# Patient Record
Sex: MALE | Race: WHITE | NOT HISPANIC OR LATINO | Employment: FULL TIME | URBAN - METROPOLITAN AREA
[De-identification: names, ages, dates, MRNs, and addresses within clinical notes are randomized per-mention and may not be internally consistent; named-entity substitution may affect disease eponyms.]

---

## 2017-01-04 ENCOUNTER — LAB VISIT (OUTPATIENT)
Dept: LAB | Facility: HOSPITAL | Age: 67
End: 2017-01-04
Attending: INTERNAL MEDICINE
Payer: COMMERCIAL

## 2017-01-04 ENCOUNTER — TELEPHONE (OUTPATIENT)
Dept: ENDOSCOPY | Facility: HOSPITAL | Age: 67
End: 2017-01-04

## 2017-01-04 ENCOUNTER — ANTI-COAG VISIT (OUTPATIENT)
Dept: CARDIOLOGY | Facility: CLINIC | Age: 67
End: 2017-01-04

## 2017-01-04 DIAGNOSIS — D68.61 ANTI-PHOSPHOLIPID ANTIBODY SYNDROME: ICD-10-CM

## 2017-01-04 DIAGNOSIS — Z79.01 LONG TERM CURRENT USE OF ANTICOAGULANT THERAPY: ICD-10-CM

## 2017-01-04 DIAGNOSIS — M32.9 LUPUS (SYSTEMIC LUPUS ERYTHEMATOSUS): ICD-10-CM

## 2017-01-04 LAB
INR PPP: 2
PROTHROMBIN TIME: 19.9 SEC

## 2017-01-04 PROCEDURE — 36415 COLL VENOUS BLD VENIPUNCTURE: CPT | Mod: PO

## 2017-01-04 PROCEDURE — 85610 PROTHROMBIN TIME: CPT

## 2017-01-10 ENCOUNTER — PATIENT MESSAGE (OUTPATIENT)
Dept: GASTROENTEROLOGY | Facility: CLINIC | Age: 67
End: 2017-01-10

## 2017-01-11 ENCOUNTER — LAB VISIT (OUTPATIENT)
Dept: LAB | Facility: HOSPITAL | Age: 67
End: 2017-01-11
Attending: INTERNAL MEDICINE
Payer: COMMERCIAL

## 2017-01-11 ENCOUNTER — ANTI-COAG VISIT (OUTPATIENT)
Dept: CARDIOLOGY | Facility: CLINIC | Age: 67
End: 2017-01-11

## 2017-01-11 DIAGNOSIS — M32.9 LUPUS (SYSTEMIC LUPUS ERYTHEMATOSUS): ICD-10-CM

## 2017-01-11 DIAGNOSIS — D68.61 ANTI-PHOSPHOLIPID ANTIBODY SYNDROME: ICD-10-CM

## 2017-01-11 DIAGNOSIS — Z79.01 LONG TERM CURRENT USE OF ANTICOAGULANT THERAPY: ICD-10-CM

## 2017-01-11 LAB
INR PPP: 2.7
PROTHROMBIN TIME: 26.6 SEC

## 2017-01-11 PROCEDURE — 36415 COLL VENOUS BLD VENIPUNCTURE: CPT | Mod: PO

## 2017-01-11 PROCEDURE — 85610 PROTHROMBIN TIME: CPT

## 2017-01-25 ENCOUNTER — ANTI-COAG VISIT (OUTPATIENT)
Dept: CARDIOLOGY | Facility: CLINIC | Age: 67
End: 2017-01-25

## 2017-01-25 ENCOUNTER — LAB VISIT (OUTPATIENT)
Dept: LAB | Facility: HOSPITAL | Age: 67
End: 2017-01-25
Attending: INTERNAL MEDICINE
Payer: COMMERCIAL

## 2017-01-25 DIAGNOSIS — D68.61 ANTI-PHOSPHOLIPID ANTIBODY SYNDROME: ICD-10-CM

## 2017-01-25 DIAGNOSIS — Z79.01 LONG TERM CURRENT USE OF ANTICOAGULANT THERAPY: ICD-10-CM

## 2017-01-25 DIAGNOSIS — M32.9 LUPUS (SYSTEMIC LUPUS ERYTHEMATOSUS): ICD-10-CM

## 2017-01-25 LAB
INR PPP: 2.6
PROTHROMBIN TIME: 26 SEC

## 2017-01-25 PROCEDURE — 36415 COLL VENOUS BLD VENIPUNCTURE: CPT | Mod: PO

## 2017-01-25 PROCEDURE — 85610 PROTHROMBIN TIME: CPT

## 2017-02-10 ENCOUNTER — ANTI-COAG VISIT (OUTPATIENT)
Dept: CARDIOLOGY | Facility: CLINIC | Age: 67
End: 2017-02-10

## 2017-02-10 ENCOUNTER — LAB VISIT (OUTPATIENT)
Dept: LAB | Facility: HOSPITAL | Age: 67
End: 2017-02-10
Attending: INTERNAL MEDICINE
Payer: COMMERCIAL

## 2017-02-10 DIAGNOSIS — D68.61 ANTI-PHOSPHOLIPID ANTIBODY SYNDROME: ICD-10-CM

## 2017-02-10 DIAGNOSIS — M32.9 LUPUS (SYSTEMIC LUPUS ERYTHEMATOSUS): ICD-10-CM

## 2017-02-10 DIAGNOSIS — Z79.01 LONG TERM CURRENT USE OF ANTICOAGULANT THERAPY: ICD-10-CM

## 2017-02-10 LAB
INR PPP: 2.6
PROTHROMBIN TIME: 26.1 SEC

## 2017-02-10 PROCEDURE — 85610 PROTHROMBIN TIME: CPT

## 2017-02-10 PROCEDURE — 36415 COLL VENOUS BLD VENIPUNCTURE: CPT | Mod: PO

## 2017-03-08 ENCOUNTER — LAB VISIT (OUTPATIENT)
Dept: LAB | Facility: HOSPITAL | Age: 67
End: 2017-03-08
Attending: INTERNAL MEDICINE
Payer: COMMERCIAL

## 2017-03-08 DIAGNOSIS — Z79.01 LONG TERM CURRENT USE OF ANTICOAGULANT THERAPY: ICD-10-CM

## 2017-03-08 DIAGNOSIS — D68.61 ANTI-PHOSPHOLIPID ANTIBODY SYNDROME: ICD-10-CM

## 2017-03-08 LAB
INR PPP: 2.4
PROTHROMBIN TIME: 23.9 SEC

## 2017-03-08 PROCEDURE — 85610 PROTHROMBIN TIME: CPT

## 2017-03-08 PROCEDURE — 36415 COLL VENOUS BLD VENIPUNCTURE: CPT | Mod: PO

## 2017-03-09 ENCOUNTER — ANTI-COAG VISIT (OUTPATIENT)
Dept: CARDIOLOGY | Facility: CLINIC | Age: 67
End: 2017-03-09

## 2017-03-09 DIAGNOSIS — Z79.01 LONG TERM CURRENT USE OF ANTICOAGULANT THERAPY: ICD-10-CM

## 2017-03-09 DIAGNOSIS — D68.61 ANTI-PHOSPHOLIPID ANTIBODY SYNDROME: ICD-10-CM

## 2017-04-05 ENCOUNTER — ANTI-COAG VISIT (OUTPATIENT)
Dept: CARDIOLOGY | Facility: CLINIC | Age: 67
End: 2017-04-05

## 2017-04-05 ENCOUNTER — LAB VISIT (OUTPATIENT)
Dept: LAB | Facility: HOSPITAL | Age: 67
End: 2017-04-05
Attending: INTERNAL MEDICINE
Payer: COMMERCIAL

## 2017-04-05 DIAGNOSIS — D68.61 ANTI-PHOSPHOLIPID ANTIBODY SYNDROME: ICD-10-CM

## 2017-04-05 DIAGNOSIS — Z79.01 LONG TERM CURRENT USE OF ANTICOAGULANT THERAPY: ICD-10-CM

## 2017-04-05 LAB
INR PPP: 3.4
PROTHROMBIN TIME: 34.7 SEC

## 2017-04-05 PROCEDURE — 36415 COLL VENOUS BLD VENIPUNCTURE: CPT | Mod: PO

## 2017-04-05 PROCEDURE — 85610 PROTHROMBIN TIME: CPT

## 2017-05-03 ENCOUNTER — LAB VISIT (OUTPATIENT)
Dept: LAB | Facility: HOSPITAL | Age: 67
End: 2017-05-03
Attending: INTERNAL MEDICINE
Payer: COMMERCIAL

## 2017-05-03 ENCOUNTER — ANTI-COAG VISIT (OUTPATIENT)
Dept: CARDIOLOGY | Facility: CLINIC | Age: 67
End: 2017-05-03

## 2017-05-03 DIAGNOSIS — Z79.01 LONG TERM CURRENT USE OF ANTICOAGULANT THERAPY: ICD-10-CM

## 2017-05-03 DIAGNOSIS — D68.61 ANTI-PHOSPHOLIPID ANTIBODY SYNDROME: ICD-10-CM

## 2017-05-03 LAB
INR PPP: 2.7
PROTHROMBIN TIME: 26.9 SEC

## 2017-05-03 PROCEDURE — 36415 COLL VENOUS BLD VENIPUNCTURE: CPT | Mod: PO

## 2017-05-03 PROCEDURE — 85610 PROTHROMBIN TIME: CPT

## 2017-06-01 ENCOUNTER — LAB VISIT (OUTPATIENT)
Dept: LAB | Facility: HOSPITAL | Age: 67
End: 2017-06-01
Attending: INTERNAL MEDICINE
Payer: COMMERCIAL

## 2017-06-01 DIAGNOSIS — D68.61 ANTI-PHOSPHOLIPID ANTIBODY SYNDROME: ICD-10-CM

## 2017-06-01 DIAGNOSIS — Z79.01 LONG TERM CURRENT USE OF ANTICOAGULANT THERAPY: ICD-10-CM

## 2017-06-01 LAB
INR PPP: 3
PROTHROMBIN TIME: 30.1 SEC

## 2017-06-01 PROCEDURE — 36415 COLL VENOUS BLD VENIPUNCTURE: CPT | Mod: PO

## 2017-06-01 PROCEDURE — 85610 PROTHROMBIN TIME: CPT

## 2017-06-02 ENCOUNTER — ANTI-COAG VISIT (OUTPATIENT)
Dept: CARDIOLOGY | Facility: CLINIC | Age: 67
End: 2017-06-02

## 2017-06-02 DIAGNOSIS — Z79.01 LONG TERM CURRENT USE OF ANTICOAGULANT THERAPY: ICD-10-CM

## 2017-06-02 DIAGNOSIS — D68.61 ANTI-PHOSPHOLIPID ANTIBODY SYNDROME: ICD-10-CM

## 2017-07-05 ENCOUNTER — LAB VISIT (OUTPATIENT)
Dept: LAB | Facility: HOSPITAL | Age: 67
End: 2017-07-05
Attending: INTERNAL MEDICINE
Payer: COMMERCIAL

## 2017-07-05 DIAGNOSIS — Z79.01 LONG TERM CURRENT USE OF ANTICOAGULANT THERAPY: ICD-10-CM

## 2017-07-05 DIAGNOSIS — D68.61 ANTI-PHOSPHOLIPID ANTIBODY SYNDROME: ICD-10-CM

## 2017-07-05 LAB
INR PPP: 3.4
PROTHROMBIN TIME: 33.7 SEC

## 2017-07-05 PROCEDURE — 85610 PROTHROMBIN TIME: CPT

## 2017-07-05 PROCEDURE — 36415 COLL VENOUS BLD VENIPUNCTURE: CPT | Mod: PO

## 2017-07-06 ENCOUNTER — ANTI-COAG VISIT (OUTPATIENT)
Dept: CARDIOLOGY | Facility: CLINIC | Age: 67
End: 2017-07-06

## 2017-07-06 DIAGNOSIS — Z79.01 LONG TERM CURRENT USE OF ANTICOAGULANT THERAPY: ICD-10-CM

## 2017-07-06 DIAGNOSIS — D68.61 ANTI-PHOSPHOLIPID ANTIBODY SYNDROME: ICD-10-CM

## 2017-07-23 DIAGNOSIS — M32.9 LUPUS (SYSTEMIC LUPUS ERYTHEMATOSUS): ICD-10-CM

## 2017-07-23 DIAGNOSIS — Z79.01 LONG TERM CURRENT USE OF ANTICOAGULANT THERAPY: ICD-10-CM

## 2017-07-23 DIAGNOSIS — D68.61 ANTI-PHOSPHOLIPID ANTIBODY SYNDROME: ICD-10-CM

## 2017-07-23 RX ORDER — WARFARIN SODIUM 5 MG/1
TABLET ORAL
Qty: 195 TABLET | Refills: 0 | Status: SHIPPED | OUTPATIENT
Start: 2017-07-23 | End: 2017-10-20 | Stop reason: SDUPTHER

## 2017-08-03 DIAGNOSIS — M32.19 OTHER SYSTEMIC LUPUS ERYTHEMATOSUS WITH OTHER ORGAN INVOLVEMENT: Primary | ICD-10-CM

## 2017-08-03 RX ORDER — HYDROXYCHLOROQUINE SULFATE 200 MG/1
200 TABLET, FILM COATED ORAL 2 TIMES DAILY
Qty: 180 TABLET | Refills: 0 | Status: SHIPPED | OUTPATIENT
Start: 2017-08-03 | End: 2017-11-01 | Stop reason: SDUPTHER

## 2017-08-16 ENCOUNTER — LAB VISIT (OUTPATIENT)
Dept: LAB | Facility: HOSPITAL | Age: 67
End: 2017-08-16
Attending: INTERNAL MEDICINE
Payer: COMMERCIAL

## 2017-08-16 ENCOUNTER — ANTI-COAG VISIT (OUTPATIENT)
Dept: CARDIOLOGY | Facility: CLINIC | Age: 67
End: 2017-08-16

## 2017-08-16 DIAGNOSIS — D68.61 ANTI-PHOSPHOLIPID ANTIBODY SYNDROME: ICD-10-CM

## 2017-08-16 DIAGNOSIS — Z79.01 LONG TERM CURRENT USE OF ANTICOAGULANT THERAPY: ICD-10-CM

## 2017-08-16 LAB
INR PPP: 3.5
PROTHROMBIN TIME: 34.8 SEC

## 2017-08-16 PROCEDURE — 36415 COLL VENOUS BLD VENIPUNCTURE: CPT | Mod: PO

## 2017-08-16 PROCEDURE — 85610 PROTHROMBIN TIME: CPT

## 2017-09-18 ENCOUNTER — PATIENT MESSAGE (OUTPATIENT)
Dept: CARDIOLOGY | Facility: CLINIC | Age: 67
End: 2017-09-18

## 2017-09-29 ENCOUNTER — LAB VISIT (OUTPATIENT)
Dept: LAB | Facility: HOSPITAL | Age: 67
End: 2017-09-29
Attending: INTERNAL MEDICINE
Payer: COMMERCIAL

## 2017-09-29 ENCOUNTER — ANTI-COAG VISIT (OUTPATIENT)
Dept: CARDIOLOGY | Facility: CLINIC | Age: 67
End: 2017-09-29

## 2017-09-29 DIAGNOSIS — Z79.01 LONG TERM CURRENT USE OF ANTICOAGULANT THERAPY: ICD-10-CM

## 2017-09-29 DIAGNOSIS — D68.61 ANTI-PHOSPHOLIPID ANTIBODY SYNDROME: ICD-10-CM

## 2017-09-29 LAB
INR PPP: 3
PROTHROMBIN TIME: 30.1 SEC

## 2017-09-29 PROCEDURE — 85610 PROTHROMBIN TIME: CPT

## 2017-09-29 PROCEDURE — 36415 COLL VENOUS BLD VENIPUNCTURE: CPT | Mod: PO

## 2017-10-15 ENCOUNTER — PATIENT MESSAGE (OUTPATIENT)
Dept: RHEUMATOLOGY | Facility: CLINIC | Age: 67
End: 2017-10-15

## 2017-10-20 DIAGNOSIS — Z79.01 LONG TERM CURRENT USE OF ANTICOAGULANT THERAPY: ICD-10-CM

## 2017-10-20 DIAGNOSIS — D68.61 ANTI-PHOSPHOLIPID ANTIBODY SYNDROME: ICD-10-CM

## 2017-10-20 DIAGNOSIS — M32.9 LUPUS (SYSTEMIC LUPUS ERYTHEMATOSUS): ICD-10-CM

## 2017-10-21 RX ORDER — WARFARIN SODIUM 5 MG/1
TABLET ORAL
Qty: 195 TABLET | Refills: 0 | Status: SHIPPED | OUTPATIENT
Start: 2017-10-21 | End: 2017-12-23 | Stop reason: SDUPTHER

## 2017-11-01 DIAGNOSIS — M32.19 OTHER SYSTEMIC LUPUS ERYTHEMATOSUS WITH OTHER ORGAN INVOLVEMENT: ICD-10-CM

## 2017-11-01 RX ORDER — HYDROXYCHLOROQUINE SULFATE 200 MG/1
TABLET, FILM COATED ORAL
Qty: 180 TABLET | Refills: 0 | Status: SHIPPED | OUTPATIENT
Start: 2017-11-01 | End: 2018-01-30 | Stop reason: SDUPTHER

## 2017-11-15 ENCOUNTER — LAB VISIT (OUTPATIENT)
Dept: LAB | Facility: HOSPITAL | Age: 67
End: 2017-11-15
Attending: INTERNAL MEDICINE
Payer: COMMERCIAL

## 2017-11-15 ENCOUNTER — ANTI-COAG VISIT (OUTPATIENT)
Dept: CARDIOLOGY | Facility: CLINIC | Age: 67
End: 2017-11-15

## 2017-11-15 DIAGNOSIS — Z79.899 HIGH RISK MEDICATION USE: ICD-10-CM

## 2017-11-15 DIAGNOSIS — Z79.01 LONG TERM CURRENT USE OF ANTICOAGULANT THERAPY: ICD-10-CM

## 2017-11-15 DIAGNOSIS — D68.61 ANTI-PHOSPHOLIPID ANTIBODY SYNDROME: Chronic | ICD-10-CM

## 2017-11-15 DIAGNOSIS — M32.9 LUPUS (SYSTEMIC LUPUS ERYTHEMATOSUS): Chronic | ICD-10-CM

## 2017-11-15 DIAGNOSIS — D68.61 ANTI-PHOSPHOLIPID ANTIBODY SYNDROME: ICD-10-CM

## 2017-11-15 LAB
25(OH)D3+25(OH)D2 SERPL-MCNC: 53 NG/ML
ALBUMIN SERPL BCP-MCNC: 3.5 G/DL
ALP SERPL-CCNC: 74 U/L
ALT SERPL W/O P-5'-P-CCNC: 21 U/L
ANION GAP SERPL CALC-SCNC: 7 MMOL/L
AST SERPL-CCNC: 24 U/L
BASOPHILS # BLD AUTO: 0 K/UL
BASOPHILS NFR BLD: 0 %
BILIRUB SERPL-MCNC: 0.3 MG/DL
BUN SERPL-MCNC: 15 MG/DL
C3 SERPL-MCNC: 67 MG/DL
C4 SERPL-MCNC: 11 MG/DL
CALCIUM SERPL-MCNC: 9.2 MG/DL
CHLORIDE SERPL-SCNC: 103 MMOL/L
CO2 SERPL-SCNC: 28 MMOL/L
CREAT SERPL-MCNC: 1.3 MG/DL
CRP SERPL-MCNC: 3.5 MG/L
DIFFERENTIAL METHOD: ABNORMAL
EOSINOPHIL # BLD AUTO: 0.1 K/UL
EOSINOPHIL NFR BLD: 5.8 %
ERYTHROCYTE [DISTWIDTH] IN BLOOD BY AUTOMATED COUNT: 12.4 %
ERYTHROCYTE [SEDIMENTATION RATE] IN BLOOD BY WESTERGREN METHOD: 20 MM/HR
EST. GFR  (AFRICAN AMERICAN): >60 ML/MIN/1.73 M^2
EST. GFR  (NON AFRICAN AMERICAN): 57.3 ML/MIN/1.73 M^2
GLUCOSE SERPL-MCNC: 82 MG/DL
HCT VFR BLD AUTO: 35.3 %
HGB BLD-MCNC: 12.1 G/DL
IMM GRANULOCYTES # BLD AUTO: 0.01 K/UL
IMM GRANULOCYTES NFR BLD AUTO: 0.4 %
INR PPP: 2.8
LYMPHOCYTES # BLD AUTO: 1 K/UL
LYMPHOCYTES NFR BLD: 41.2 %
MCH RBC QN AUTO: 31.2 PG
MCHC RBC AUTO-ENTMCNC: 34.3 G/DL
MCV RBC AUTO: 91 FL
MONOCYTES # BLD AUTO: 0.3 K/UL
MONOCYTES NFR BLD: 11.9 %
NEUTROPHILS # BLD AUTO: 1 K/UL
NEUTROPHILS NFR BLD: 40.7 %
NRBC BLD-RTO: 0 /100 WBC
PLATELET # BLD AUTO: 191 K/UL
PMV BLD AUTO: 10.3 FL
POTASSIUM SERPL-SCNC: 4.1 MMOL/L
PROT SERPL-MCNC: 6.4 G/DL
PROTHROMBIN TIME: 27.6 SEC
RBC # BLD AUTO: 3.88 M/UL
SODIUM SERPL-SCNC: 138 MMOL/L
WBC # BLD AUTO: 2.43 K/UL

## 2017-11-15 PROCEDURE — 86160 COMPLEMENT ANTIGEN: CPT | Mod: 59

## 2017-11-15 PROCEDURE — 86225 DNA ANTIBODY NATIVE: CPT

## 2017-11-15 PROCEDURE — 36415 COLL VENOUS BLD VENIPUNCTURE: CPT | Mod: PO

## 2017-11-15 PROCEDURE — 85651 RBC SED RATE NONAUTOMATED: CPT

## 2017-11-15 PROCEDURE — 86160 COMPLEMENT ANTIGEN: CPT

## 2017-11-15 PROCEDURE — 85610 PROTHROMBIN TIME: CPT

## 2017-11-15 PROCEDURE — 86880 COOMBS TEST DIRECT: CPT

## 2017-11-15 PROCEDURE — 86140 C-REACTIVE PROTEIN: CPT

## 2017-11-15 PROCEDURE — 80053 COMPREHEN METABOLIC PANEL: CPT

## 2017-11-15 PROCEDURE — 85025 COMPLETE CBC W/AUTO DIFF WBC: CPT

## 2017-11-15 PROCEDURE — 82306 VITAMIN D 25 HYDROXY: CPT

## 2017-11-16 LAB
DAT IGG-SP REAG RBC-IMP: NORMAL
DSDNA AB SER-ACNC: NORMAL [IU]/ML

## 2017-11-29 ENCOUNTER — LAB VISIT (OUTPATIENT)
Dept: LAB | Facility: HOSPITAL | Age: 67
End: 2017-11-29
Attending: INTERNAL MEDICINE
Payer: COMMERCIAL

## 2017-11-29 ENCOUNTER — OFFICE VISIT (OUTPATIENT)
Dept: RHEUMATOLOGY | Facility: CLINIC | Age: 67
End: 2017-11-29
Payer: COMMERCIAL

## 2017-11-29 VITALS
SYSTOLIC BLOOD PRESSURE: 114 MMHG | BODY MASS INDEX: 29.78 KG/M2 | WEIGHT: 208 LBS | HEIGHT: 70 IN | DIASTOLIC BLOOD PRESSURE: 69 MMHG | HEART RATE: 93 BPM

## 2017-11-29 DIAGNOSIS — M32.19 OTHER SYSTEMIC LUPUS ERYTHEMATOSUS WITH OTHER ORGAN INVOLVEMENT: Primary | Chronic | ICD-10-CM

## 2017-11-29 DIAGNOSIS — Z79.01 LONG TERM CURRENT USE OF ANTICOAGULANT THERAPY: ICD-10-CM

## 2017-11-29 DIAGNOSIS — Z79.899 HIGH RISK MEDICATION USE: ICD-10-CM

## 2017-11-29 DIAGNOSIS — M32.19 OTHER SYSTEMIC LUPUS ERYTHEMATOSUS WITH OTHER ORGAN INVOLVEMENT: Chronic | ICD-10-CM

## 2017-11-29 DIAGNOSIS — D68.61 ANTI-PHOSPHOLIPID ANTIBODY SYNDROME: Chronic | ICD-10-CM

## 2017-11-29 LAB
BILIRUB UR QL STRIP: NEGATIVE
CLARITY UR REFRACT.AUTO: ABNORMAL
COLOR UR AUTO: YELLOW
CREAT UR-MCNC: 244 MG/DL
GLUCOSE UR QL STRIP: NEGATIVE
HGB UR QL STRIP: NEGATIVE
KETONES UR QL STRIP: NEGATIVE
LEUKOCYTE ESTERASE UR QL STRIP: NEGATIVE
MICROSCOPIC COMMENT: NORMAL
NITRITE UR QL STRIP: NEGATIVE
PH UR STRIP: 5 [PH] (ref 5–8)
PROT UR QL STRIP: NEGATIVE
PROT UR-MCNC: 11 MG/DL
PROT/CREAT RATIO, UR: 0.05
RBC #/AREA URNS AUTO: 3 /HPF (ref 0–4)
SP GR UR STRIP: 1.02 (ref 1–1.03)
URN SPEC COLLECT METH UR: ABNORMAL
UROBILINOGEN UR STRIP-ACNC: NEGATIVE EU/DL
WBC #/AREA URNS AUTO: 0 /HPF (ref 0–5)

## 2017-11-29 PROCEDURE — 99999 PR PBB SHADOW E&M-EST. PATIENT-LVL IV: CPT | Mod: PBBFAC,,, | Performed by: INTERNAL MEDICINE

## 2017-11-29 PROCEDURE — 99214 OFFICE O/P EST MOD 30 MIN: CPT | Mod: S$GLB,,, | Performed by: INTERNAL MEDICINE

## 2017-11-29 PROCEDURE — 81001 URINALYSIS AUTO W/SCOPE: CPT

## 2017-11-29 PROCEDURE — 82570 ASSAY OF URINE CREATININE: CPT

## 2017-11-29 ASSESSMENT — ROUTINE ASSESSMENT OF PATIENT INDEX DATA (RAPID3)
WHEN YOU AWAKENED IN THE MORNING OVER THE LAST WEEK, PLEASE INDICATE THE AMOUNT OF TIME IT TAKES UNTIL YOU ARE AS LIMBER AS YOU WILL BE FOR THE DAY: 2-3 MINS
AM STIFFNESS SCORE: 1, YES
PSYCHOLOGICAL DISTRESS SCORE: 0
PATIENT GLOBAL ASSESSMENT SCORE: 0
TOTAL RAPID3 SCORE: 0
PAIN SCORE: 0
MDHAQ FUNCTION SCORE: 0
FATIGUE SCORE: 0

## 2017-11-29 ASSESSMENT — SYSTEMIC LUPUS ERYTHEMATOSUS DISEASE ACTIVITY INDEX (SLEDAI): TOTAL_SCORE: 1
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
## 2017-12-23 DIAGNOSIS — Z79.01 LONG TERM CURRENT USE OF ANTICOAGULANT THERAPY: ICD-10-CM

## 2017-12-23 DIAGNOSIS — M32.9 LUPUS (SYSTEMIC LUPUS ERYTHEMATOSUS): ICD-10-CM

## 2017-12-23 DIAGNOSIS — D68.61 ANTI-PHOSPHOLIPID ANTIBODY SYNDROME: ICD-10-CM

## 2017-12-24 RX ORDER — WARFARIN SODIUM 5 MG/1
TABLET ORAL
Qty: 195 TABLET | Refills: 0 | Status: SHIPPED | OUTPATIENT
Start: 2017-12-24 | End: 2018-03-24 | Stop reason: SDUPTHER

## 2018-01-03 ENCOUNTER — ANTI-COAG VISIT (OUTPATIENT)
Dept: CARDIOLOGY | Facility: CLINIC | Age: 68
End: 2018-01-03

## 2018-01-03 ENCOUNTER — LAB VISIT (OUTPATIENT)
Dept: LAB | Facility: HOSPITAL | Age: 68
End: 2018-01-03
Attending: INTERNAL MEDICINE
Payer: COMMERCIAL

## 2018-01-03 DIAGNOSIS — D68.61 ANTI-PHOSPHOLIPID ANTIBODY SYNDROME: ICD-10-CM

## 2018-01-03 DIAGNOSIS — Z79.01 LONG TERM CURRENT USE OF ANTICOAGULANT THERAPY: ICD-10-CM

## 2018-01-03 LAB
INR PPP: 3.1
PROTHROMBIN TIME: 31.3 SEC

## 2018-01-03 PROCEDURE — 85610 PROTHROMBIN TIME: CPT

## 2018-01-03 PROCEDURE — 36415 COLL VENOUS BLD VENIPUNCTURE: CPT | Mod: PO

## 2018-01-05 ENCOUNTER — APPOINTMENT (OUTPATIENT)
Dept: RADIOLOGY | Facility: CLINIC | Age: 68
End: 2018-01-05
Attending: INTERNAL MEDICINE
Payer: COMMERCIAL

## 2018-01-05 DIAGNOSIS — M32.19 OTHER SYSTEMIC LUPUS ERYTHEMATOSUS WITH OTHER ORGAN INVOLVEMENT: Chronic | ICD-10-CM

## 2018-01-05 PROCEDURE — 77080 DXA BONE DENSITY AXIAL: CPT | Mod: 26,,, | Performed by: INTERNAL MEDICINE

## 2018-01-05 PROCEDURE — 77080 DXA BONE DENSITY AXIAL: CPT | Mod: TC,PO

## 2018-01-30 DIAGNOSIS — M32.19 OTHER SYSTEMIC LUPUS ERYTHEMATOSUS WITH OTHER ORGAN INVOLVEMENT: ICD-10-CM

## 2018-01-30 RX ORDER — HYDROXYCHLOROQUINE SULFATE 200 MG/1
TABLET, FILM COATED ORAL
Qty: 180 TABLET | Refills: 0 | Status: SHIPPED | OUTPATIENT
Start: 2018-01-30 | End: 2018-04-29 | Stop reason: SDUPTHER

## 2018-02-21 ENCOUNTER — LAB VISIT (OUTPATIENT)
Dept: LAB | Facility: HOSPITAL | Age: 68
End: 2018-02-21
Attending: INTERNAL MEDICINE
Payer: COMMERCIAL

## 2018-02-21 DIAGNOSIS — Z79.01 LONG TERM CURRENT USE OF ANTICOAGULANT THERAPY: ICD-10-CM

## 2018-02-21 DIAGNOSIS — D68.61 ANTI-PHOSPHOLIPID ANTIBODY SYNDROME: ICD-10-CM

## 2018-02-21 LAB
INR PPP: 3.1
PROTHROMBIN TIME: 29.7 SEC

## 2018-02-21 PROCEDURE — 85610 PROTHROMBIN TIME: CPT

## 2018-02-21 PROCEDURE — 36415 COLL VENOUS BLD VENIPUNCTURE: CPT | Mod: PO

## 2018-02-22 ENCOUNTER — ANTI-COAG VISIT (OUTPATIENT)
Dept: CARDIOLOGY | Facility: CLINIC | Age: 68
End: 2018-02-22

## 2018-02-22 DIAGNOSIS — D68.61 ANTI-PHOSPHOLIPID ANTIBODY SYNDROME: ICD-10-CM

## 2018-02-22 DIAGNOSIS — Z79.01 LONG TERM CURRENT USE OF ANTICOAGULANT THERAPY: ICD-10-CM

## 2018-03-06 ENCOUNTER — PATIENT MESSAGE (OUTPATIENT)
Dept: ADMINISTRATIVE | Facility: HOSPITAL | Age: 68
End: 2018-03-06

## 2018-03-24 DIAGNOSIS — M32.9 LUPUS (SYSTEMIC LUPUS ERYTHEMATOSUS): ICD-10-CM

## 2018-03-24 DIAGNOSIS — D68.61 ANTI-PHOSPHOLIPID ANTIBODY SYNDROME: ICD-10-CM

## 2018-03-24 DIAGNOSIS — Z79.01 LONG TERM CURRENT USE OF ANTICOAGULANT THERAPY: ICD-10-CM

## 2018-03-25 RX ORDER — WARFARIN SODIUM 5 MG/1
TABLET ORAL
Qty: 195 TABLET | Refills: 0 | Status: SHIPPED | OUTPATIENT
Start: 2018-03-25 | End: 2018-06-09 | Stop reason: SDUPTHER

## 2018-04-11 ENCOUNTER — LAB VISIT (OUTPATIENT)
Dept: LAB | Facility: HOSPITAL | Age: 68
End: 2018-04-11
Attending: INTERNAL MEDICINE
Payer: COMMERCIAL

## 2018-04-11 DIAGNOSIS — Z79.01 LONG TERM CURRENT USE OF ANTICOAGULANT THERAPY: ICD-10-CM

## 2018-04-11 DIAGNOSIS — D68.61 ANTI-PHOSPHOLIPID ANTIBODY SYNDROME: ICD-10-CM

## 2018-04-11 LAB
INR PPP: 3.1
PROTHROMBIN TIME: 30.1 SEC

## 2018-04-11 PROCEDURE — 36415 COLL VENOUS BLD VENIPUNCTURE: CPT | Mod: PO

## 2018-04-11 PROCEDURE — 85610 PROTHROMBIN TIME: CPT

## 2018-04-12 ENCOUNTER — ANTI-COAG VISIT (OUTPATIENT)
Dept: CARDIOLOGY | Facility: CLINIC | Age: 68
End: 2018-04-12

## 2018-04-12 DIAGNOSIS — D68.61 ANTI-PHOSPHOLIPID ANTIBODY SYNDROME: ICD-10-CM

## 2018-04-12 DIAGNOSIS — Z79.01 LONG TERM CURRENT USE OF ANTICOAGULANT THERAPY: ICD-10-CM

## 2018-04-29 DIAGNOSIS — M32.19 OTHER SYSTEMIC LUPUS ERYTHEMATOSUS WITH OTHER ORGAN INVOLVEMENT: ICD-10-CM

## 2018-04-30 RX ORDER — HYDROXYCHLOROQUINE SULFATE 200 MG/1
TABLET, FILM COATED ORAL
Qty: 180 TABLET | Refills: 0 | Status: SHIPPED | OUTPATIENT
Start: 2018-04-30 | End: 2018-07-29 | Stop reason: SDUPTHER

## 2018-06-06 ENCOUNTER — LAB VISIT (OUTPATIENT)
Dept: LAB | Facility: HOSPITAL | Age: 68
End: 2018-06-06
Attending: INTERNAL MEDICINE
Payer: COMMERCIAL

## 2018-06-06 DIAGNOSIS — D68.61 ANTI-PHOSPHOLIPID ANTIBODY SYNDROME: ICD-10-CM

## 2018-06-06 DIAGNOSIS — Z79.01 LONG TERM CURRENT USE OF ANTICOAGULANT THERAPY: ICD-10-CM

## 2018-06-06 LAB
INR PPP: 3.3
PROTHROMBIN TIME: 32.4 SEC

## 2018-06-06 PROCEDURE — 36415 COLL VENOUS BLD VENIPUNCTURE: CPT | Mod: PO

## 2018-06-06 PROCEDURE — 85610 PROTHROMBIN TIME: CPT

## 2018-06-07 ENCOUNTER — ANTI-COAG VISIT (OUTPATIENT)
Dept: CARDIOLOGY | Facility: CLINIC | Age: 68
End: 2018-06-07

## 2018-06-07 DIAGNOSIS — Z79.01 LONG TERM CURRENT USE OF ANTICOAGULANT THERAPY: ICD-10-CM

## 2018-06-07 DIAGNOSIS — D68.61 ANTI-PHOSPHOLIPID ANTIBODY SYNDROME: ICD-10-CM

## 2018-06-09 DIAGNOSIS — M32.9 LUPUS (SYSTEMIC LUPUS ERYTHEMATOSUS): ICD-10-CM

## 2018-06-09 DIAGNOSIS — D68.61 ANTI-PHOSPHOLIPID ANTIBODY SYNDROME: ICD-10-CM

## 2018-06-09 DIAGNOSIS — Z79.01 LONG TERM CURRENT USE OF ANTICOAGULANT THERAPY: ICD-10-CM

## 2018-06-11 RX ORDER — WARFARIN SODIUM 5 MG/1
TABLET ORAL
Qty: 195 TABLET | Refills: 0 | Status: SHIPPED | OUTPATIENT
Start: 2018-06-11 | End: 2018-09-15 | Stop reason: SDUPTHER

## 2018-07-18 ENCOUNTER — LAB VISIT (OUTPATIENT)
Dept: LAB | Facility: HOSPITAL | Age: 68
End: 2018-07-18
Attending: INTERNAL MEDICINE
Payer: COMMERCIAL

## 2018-07-18 ENCOUNTER — ANTI-COAG VISIT (OUTPATIENT)
Dept: CARDIOLOGY | Facility: CLINIC | Age: 68
End: 2018-07-18

## 2018-07-18 DIAGNOSIS — Z79.01 LONG TERM CURRENT USE OF ANTICOAGULANT THERAPY: ICD-10-CM

## 2018-07-18 DIAGNOSIS — D68.61 ANTI-PHOSPHOLIPID ANTIBODY SYNDROME: ICD-10-CM

## 2018-07-18 LAB
INR PPP: 4.1
PROTHROMBIN TIME: 40 SEC

## 2018-07-18 PROCEDURE — 85610 PROTHROMBIN TIME: CPT

## 2018-07-18 PROCEDURE — 36415 COLL VENOUS BLD VENIPUNCTURE: CPT | Mod: PO

## 2018-07-29 DIAGNOSIS — M32.19 OTHER SYSTEMIC LUPUS ERYTHEMATOSUS WITH OTHER ORGAN INVOLVEMENT: ICD-10-CM

## 2018-07-29 RX ORDER — HYDROXYCHLOROQUINE SULFATE 200 MG/1
TABLET, FILM COATED ORAL
Qty: 180 TABLET | Refills: 0 | Status: SHIPPED | OUTPATIENT
Start: 2018-07-29 | End: 2018-10-27 | Stop reason: SDUPTHER

## 2018-08-07 ENCOUNTER — ANTI-COAG VISIT (OUTPATIENT)
Dept: CARDIOLOGY | Facility: CLINIC | Age: 68
End: 2018-08-07

## 2018-08-07 ENCOUNTER — LAB VISIT (OUTPATIENT)
Dept: LAB | Facility: HOSPITAL | Age: 68
End: 2018-08-07
Attending: INTERNAL MEDICINE
Payer: COMMERCIAL

## 2018-08-07 DIAGNOSIS — Z79.01 LONG TERM CURRENT USE OF ANTICOAGULANT THERAPY: ICD-10-CM

## 2018-08-07 DIAGNOSIS — D68.61 ANTI-PHOSPHOLIPID ANTIBODY SYNDROME: ICD-10-CM

## 2018-08-07 LAB
INR PPP: 3.5
PROTHROMBIN TIME: 33.9 SEC

## 2018-08-07 PROCEDURE — 85610 PROTHROMBIN TIME: CPT

## 2018-08-07 PROCEDURE — 36415 COLL VENOUS BLD VENIPUNCTURE: CPT | Mod: PO

## 2018-08-30 ENCOUNTER — LAB VISIT (OUTPATIENT)
Dept: LAB | Facility: HOSPITAL | Age: 68
End: 2018-08-30
Attending: INTERNAL MEDICINE
Payer: COMMERCIAL

## 2018-08-30 ENCOUNTER — ANTI-COAG VISIT (OUTPATIENT)
Dept: CARDIOLOGY | Facility: CLINIC | Age: 68
End: 2018-08-30

## 2018-08-30 DIAGNOSIS — D68.61 ANTI-PHOSPHOLIPID ANTIBODY SYNDROME: ICD-10-CM

## 2018-08-30 DIAGNOSIS — Z79.01 LONG TERM CURRENT USE OF ANTICOAGULANT THERAPY: ICD-10-CM

## 2018-08-30 LAB
INR PPP: 2.5
PROTHROMBIN TIME: 23.8 SEC

## 2018-08-30 PROCEDURE — 85610 PROTHROMBIN TIME: CPT

## 2018-08-30 PROCEDURE — 36415 COLL VENOUS BLD VENIPUNCTURE: CPT | Mod: PO

## 2018-09-06 ENCOUNTER — HOSPITAL ENCOUNTER (OUTPATIENT)
Dept: RADIOLOGY | Facility: HOSPITAL | Age: 68
Discharge: HOME | End: 2018-09-06
Attending: ORTHOPAEDIC SURGERY
Payer: COMMERCIAL

## 2018-09-06 ENCOUNTER — OFFICE VISIT (OUTPATIENT)
Dept: ORTHOPEDICS | Facility: CLINIC | Age: 68
End: 2018-09-06
Payer: COMMERCIAL

## 2018-09-06 VITALS — BODY MASS INDEX: 29.78 KG/M2 | HEIGHT: 70 IN | WEIGHT: 208 LBS

## 2018-09-06 DIAGNOSIS — M25.561 RIGHT KNEE PAIN, UNSPECIFIED CHRONICITY: ICD-10-CM

## 2018-09-06 DIAGNOSIS — M76.32 IT BAND SYNDROME, LEFT: Primary | ICD-10-CM

## 2018-09-06 DIAGNOSIS — M25.561 RIGHT KNEE PAIN, UNSPECIFIED CHRONICITY: Primary | ICD-10-CM

## 2018-09-06 DIAGNOSIS — M32.19 OTHER SYSTEMIC LUPUS ERYTHEMATOSUS WITH OTHER ORGAN INVOLVEMENT: ICD-10-CM

## 2018-09-06 PROCEDURE — 1101F PT FALLS ASSESS-DOCD LE1/YR: CPT | Mod: CPTII,S$GLB,, | Performed by: ORTHOPAEDIC SURGERY

## 2018-09-06 PROCEDURE — 99202 OFFICE O/P NEW SF 15 MIN: CPT | Mod: 25,S$GLB,, | Performed by: ORTHOPAEDIC SURGERY

## 2018-09-06 PROCEDURE — 99999 PR PBB SHADOW E&M-EST. PATIENT-LVL II: CPT | Mod: PBBFAC,,, | Performed by: ORTHOPAEDIC SURGERY

## 2018-09-06 PROCEDURE — 3008F BODY MASS INDEX DOCD: CPT | Mod: CPTII,S$GLB,, | Performed by: ORTHOPAEDIC SURGERY

## 2018-09-06 PROCEDURE — 20610 DRAIN/INJ JOINT/BURSA W/O US: CPT | Mod: LT,S$GLB,, | Performed by: ORTHOPAEDIC SURGERY

## 2018-09-06 PROCEDURE — 73562 X-RAY EXAM OF KNEE 3: CPT | Mod: 26,RT,, | Performed by: RADIOLOGY

## 2018-09-06 PROCEDURE — 73562 X-RAY EXAM OF KNEE 3: CPT | Mod: TC,FY,RT

## 2018-09-06 RX ORDER — TRIAMCINOLONE ACETONIDE 40 MG/ML
40 INJECTION, SUSPENSION INTRA-ARTICULAR; INTRAMUSCULAR
Status: COMPLETED | OUTPATIENT
Start: 2018-09-06 | End: 2018-09-06

## 2018-09-06 RX ORDER — HYDROXYCHLOROQUINE SULFATE 200 MG/1
200 TABLET, FILM COATED ORAL 2 TIMES DAILY
Qty: 180 TABLET | Refills: 0 | Status: CANCELLED | OUTPATIENT
Start: 2018-09-06

## 2018-09-06 RX ADMIN — TRIAMCINOLONE ACETONIDE 40 MG: 40 INJECTION, SUSPENSION INTRA-ARTICULAR; INTRAMUSCULAR at 11:09

## 2018-09-06 NOTE — PROCEDURES
Procedures      After obtaining verbal informed consent the patient's left knee was prepped aseptically and injected laterally deep to the IT band using 40 mg of triamcinolone and 1 cc of 1% plain Xylocaine.  The patient was warned about postinjection flare and how to manage it with ice, rest and over-the-counter analgesics.  They're advised to contact me for any severe, uncontrolled pain.

## 2018-09-15 DIAGNOSIS — Z79.01 LONG TERM CURRENT USE OF ANTICOAGULANT THERAPY: ICD-10-CM

## 2018-09-15 DIAGNOSIS — D68.61 ANTI-PHOSPHOLIPID ANTIBODY SYNDROME: ICD-10-CM

## 2018-09-15 DIAGNOSIS — M32.9 LUPUS (SYSTEMIC LUPUS ERYTHEMATOSUS): ICD-10-CM

## 2018-09-17 RX ORDER — WARFARIN SODIUM 5 MG/1
TABLET ORAL
Qty: 195 TABLET | Refills: 0 | Status: SHIPPED | OUTPATIENT
Start: 2018-09-17 | End: 2018-11-20 | Stop reason: SDUPTHER

## 2018-09-27 ENCOUNTER — ANTI-COAG VISIT (OUTPATIENT)
Dept: CARDIOLOGY | Facility: CLINIC | Age: 68
End: 2018-09-27

## 2018-09-27 ENCOUNTER — LAB VISIT (OUTPATIENT)
Dept: LAB | Facility: HOSPITAL | Age: 68
End: 2018-09-27
Attending: INTERNAL MEDICINE
Payer: COMMERCIAL

## 2018-09-27 DIAGNOSIS — Z79.01 LONG TERM CURRENT USE OF ANTICOAGULANT THERAPY: ICD-10-CM

## 2018-09-27 DIAGNOSIS — D68.61 ANTI-PHOSPHOLIPID ANTIBODY SYNDROME: ICD-10-CM

## 2018-09-27 LAB
INR PPP: 3
PROTHROMBIN TIME: 28.8 SEC

## 2018-09-27 PROCEDURE — 85610 PROTHROMBIN TIME: CPT

## 2018-09-27 PROCEDURE — 36415 COLL VENOUS BLD VENIPUNCTURE: CPT | Mod: PO

## 2018-10-26 DIAGNOSIS — M32.19 OTHER SYSTEMIC LUPUS ERYTHEMATOSUS WITH OTHER ORGAN INVOLVEMENT: ICD-10-CM

## 2018-10-27 RX ORDER — HYDROXYCHLOROQUINE SULFATE 200 MG/1
TABLET, FILM COATED ORAL
Qty: 180 TABLET | Refills: 0 | Status: SHIPPED | OUTPATIENT
Start: 2018-10-27 | End: 2019-02-20 | Stop reason: SDUPTHER

## 2018-11-01 ENCOUNTER — LAB VISIT (OUTPATIENT)
Dept: LAB | Facility: HOSPITAL | Age: 68
End: 2018-11-01
Attending: INTERNAL MEDICINE
Payer: COMMERCIAL

## 2018-11-01 ENCOUNTER — ANTI-COAG VISIT (OUTPATIENT)
Dept: CARDIOLOGY | Facility: CLINIC | Age: 68
End: 2018-11-01

## 2018-11-01 DIAGNOSIS — Z79.01 LONG TERM CURRENT USE OF ANTICOAGULANT THERAPY: ICD-10-CM

## 2018-11-01 DIAGNOSIS — D68.61 ANTI-PHOSPHOLIPID ANTIBODY SYNDROME: ICD-10-CM

## 2018-11-01 LAB
INR PPP: 4
PROTHROMBIN TIME: 39 SEC

## 2018-11-01 PROCEDURE — 36415 COLL VENOUS BLD VENIPUNCTURE: CPT | Mod: PO

## 2018-11-01 PROCEDURE — 85610 PROTHROMBIN TIME: CPT

## 2018-11-02 NOTE — PROGRESS NOTES
Patient advised to take 7.5mg 11/2/18  Then maintain regular dose    Advised follow up 11/15/18     Patient verbalized understanding

## 2018-11-15 ENCOUNTER — ANTI-COAG VISIT (OUTPATIENT)
Dept: CARDIOLOGY | Facility: CLINIC | Age: 68
End: 2018-11-15

## 2018-11-15 ENCOUNTER — LAB VISIT (OUTPATIENT)
Dept: LAB | Facility: HOSPITAL | Age: 68
End: 2018-11-15
Attending: INTERNAL MEDICINE
Payer: COMMERCIAL

## 2018-11-15 DIAGNOSIS — Z79.01 LONG TERM CURRENT USE OF ANTICOAGULANT THERAPY: ICD-10-CM

## 2018-11-15 DIAGNOSIS — D68.61 ANTI-PHOSPHOLIPID ANTIBODY SYNDROME: ICD-10-CM

## 2018-11-15 LAB
INR PPP: 2.8
PROTHROMBIN TIME: 27.5 SEC

## 2018-11-15 PROCEDURE — 36415 COLL VENOUS BLD VENIPUNCTURE: CPT | Mod: PO

## 2018-11-15 PROCEDURE — 85610 PROTHROMBIN TIME: CPT

## 2018-11-17 ENCOUNTER — OFFICE VISIT (OUTPATIENT)
Dept: URGENT CARE | Facility: CLINIC | Age: 68
End: 2018-11-17
Payer: COMMERCIAL

## 2018-11-17 VITALS
RESPIRATION RATE: 18 BRPM | HEIGHT: 70 IN | OXYGEN SATURATION: 97 % | WEIGHT: 203 LBS | TEMPERATURE: 98 F | HEART RATE: 83 BPM | DIASTOLIC BLOOD PRESSURE: 80 MMHG | BODY MASS INDEX: 29.06 KG/M2 | SYSTOLIC BLOOD PRESSURE: 135 MMHG

## 2018-11-17 DIAGNOSIS — S80.212A ABRASION OF LEFT KNEE, INITIAL ENCOUNTER: ICD-10-CM
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
## 2018-11-17 PROCEDURE — 99203 OFFICE O/P NEW LOW 30 MIN: CPT | Mod: S$GLB,,, | Performed by: NURSE PRACTITIONER

## 2018-11-17 PROCEDURE — 1101F PT FALLS ASSESS-DOCD LE1/YR: CPT | Mod: CPTII,S$GLB,, | Performed by: NURSE PRACTITIONER

## 2018-11-17 RX ORDER — TALC
POWDER (GRAM) TOPICAL
COMMUNITY
Start: 2018-05-01

## 2018-11-17 RX ORDER — MUPIROCIN 20 MG/G
OINTMENT TOPICAL
Qty: 22 G | Refills: 1 | Status: SHIPPED | OUTPATIENT
Start: 2018-11-17

## 2018-11-17 RX ORDER — SCOPALAMINE 1 MG/3D
PATCH, EXTENDED RELEASE TRANSDERMAL
Refills: 1 | COMMUNITY
Start: 2018-10-17

## 2018-11-17 NOTE — PATIENT INSTRUCTIONS
Follow-up with PCP or return to Urgent Care for worsening of symptoms.    Abrasions  Abrasions are skin scrapes. Their treatment depends on how large and deep the abrasion is.  Home care  You may be prescribed an antibiotic cream or ointment to apply to the wound. This helps prevent infection. Follow instructions when using this medicine.  General care  · To care for the abrasion, do the following each day for as long as directed by your healthcare provider.  ¨ If you were given a bandage, change it once a day. If your bandage sticks to the wound, soak it in warm water until it loosens.  ¨ Wash the area with soap and warm water. You may do this in a sink or under a tub faucet or shower. Rinse off the soap. Then pat the area dry with a clean towel.  ¨ If antibiotic ointment or cream was prescribed, reapply it to the wound as directed. Cover the wound with a fresh nonstick bandage. If the bandage becomes wet or dirty, change it as soon as possible.  ¨ Some antibiotic ointments or cream can cause an allergic reaction or dermatitis. This may cause redness, itching and or hives. If this occurs, stop using the ointment immediately and wash off any remaining ointment. You may need to take some allergy medicine to relieve symptoms.  · You may use acetaminophen or ibuprofen to control pain unless another pain medicine was prescribed. Talk with your healthcare provider before using these medicines if you have chronic liver or kidney disease or ever had a stomach ulcer or GI bleeding. Dont use ibuprofen in children younger than six months old.  · Most skin wounds heal within 10 days. But an infection may occur even with treatment. So its important to watch the wound for signs of infection as listed below.  Follow-up care  Follow up with your healthcare provider, or as advised.  When to seek medical advice  Call your healthcare provider right away if any of these occur:  · Fever of 100.4ºF (38ºC) or higher, or as directed  by your healthcare provider  · Increasing pain, redness, swelling, or drainage from the wound  · Bleeding from the wound that does not stop after a few minutes of steady, firm pressure  · Decreased ability to move any body part near the wound  Date Last Reviewed: 3/3/2017  © 9841-7313 [employer]. [address] [place] [address]. All rights reserved. This information is not intended as a substitute for professional medical care. Always follow your healthcare professional's instructions.        Uncertain Causes of Fall  You have had a fall today. But the cause of your fall is not certain. Falls can occur due to slipping, tripping or losing your balance. A fall can also occur from a fainting spell or seizure.  While a fall can happen for a simple reason (tripping over something), falls in elderly people are often caused by a combination of things:  · Age-related decline in function with worsening balance, stability, vision, and muscle strength  · Chronic illness such as heart arrhythmias, heart valve disease, vascular disease, COPD, diabetes, strokes, arthritis  · Effects or side effects of medicines  · Dehydration.  · Environmental hazards such as uneven or slippery ground, unfamiliar place, obstacles, uneven surfaces, or slippery ground  · Situational factors (related to the activity being done, e.g., rushing to the bathroom)  Because the cause of your fall today is not certain, it is possible that a fainting spell or seizure was the cause. This means that it could happen again, without warning. If you fall again, without a cause, then you should return to this facility promptly to have further tests. Otherwise, follow up with your doctor as explained below.  It is normal to feel sore and tight in your muscles and back the next day, and not just the muscles you initially injured. Remember, all the parts of your body are connected, so while initially one area hurts, the next day another may  hurt. Also, when you injure yourself, it causes inflammation, which then causes the muscles to tighten up and hurt more. After the initial worsening, it should gradually improve over the next few days. However, more severe pain should be reported.  Even without a definite head injury, you can still get a concussion. Concussions and even bleeding can still occur, especially if you have had a recent injury or take blood thinner medicine. It is not unusual to have a mild headache and feel tired and even nauseous or dizzy.  Home care  · Rest today and resume your normal activities as soon as you are feeling back to normal. It is best to remain with someone who can check on you for the next 24 hours to watch for another episode of falling.  · If you were injured during the fall, follow the advice from your doctor regarding care of your injury.  ·  If you become light-headed or dizzy, lie down immediately or sit and lean forward with your head down.  · As a precaution, do not drive a car or operate dangerous equipment, do not take a bath alone (use a shower instead) and do not swim alone until you see your doctor. A condition causing fainting or seizures must be ruled out before resuming these activities.  · You may use acetaminophen or ibuprofen to control pain, unless another pain medicine was prescribed. If you have chronic liver or kidney disease or ever had a stomach ulcer or gastrointestinal bleeding, talk with your doctor before using these medicines.  · Keep your appointments for any further testing that may have been scheduled for you.  Follow-up care  Follow up with your healthcare provider, or as advised.  If X-rays or CT scan were done, you will be notified if there is a change in the reading, especially if it affects treatment.  Call 911  Call 911 if any of these occur:  · Trouble breathing  · Confused or difficulty arousing  · Fainting or loss of consciousness  · Rapid or very slow heart  rate  · Seizure  · Difficulty with speech or vision, weakness of an arm or leg  · Difficulty walking or talking, loss of balance, numbness or weakness in one side of your body, facial droop  When to seek medical advice  Call your healthcare provider right away if any of these occur:  · Another unexplained fall  · Dizziness  · Severe headache  · Nausea and vomiting  · Blood in vomit, stools (black or red color)  Date Last Reviewed: 11/5/2015  © 8229-5994 [employer]. [address] [place] [address]. All rights reserved. This information is not intended as a substitute for professional medical care. Always follow your healthcare professional's instructions.      Please return here or go to the Emergency Department for any concerns or worsening of condition.  If you were prescribed antibiotics, please take them to completion.  If you were prescribed a narcotic medication, do not drive or operate heavy equipment or machinery while taking these medications.  Please follow up with your primary care doctor or specialist as needed.    If you  smoke, please stop smoking.

## 2018-11-17 NOTE — PROGRESS NOTES
"Subjective:       Patient ID: Viraj Olsen is a 68 y.o. male.    Vitals:  height is 5' 10" (1.778 m) and weight is 92.1 kg (203 lb). His tympanic temperature is 97.5 °F (36.4 °C). His blood pressure is 135/80 and his pulse is 83. His respiration is 18 and oxygen saturation is 97%.     Chief Complaint: Knee Injury and Rib Injury    Patient presents with complaints of left knee abrasion and right rib pain secondary to a fall that occurred last night. Patient reports leaving the [employer] basketball game and tripping over an object sticking out of the ground. Patient denies any head trauma or LOC. Denies taking any mediations for his symptoms.      Knee Injury   This is a new problem. The current episode started yesterday. The problem occurs rarely. The problem has been gradually worsening. Pertinent negatives include no arthralgias, chest pain, chills, congestion, coughing, fatigue, fever, headaches, joint swelling, myalgias, nausea, rash, sore throat, vertigo or vomiting. The symptoms are aggravated by walking. He has tried rest and acetaminophen for the symptoms. The treatment provided mild relief.       Constitution: Positive for activity change. Negative for chills, fatigue and fever.   HENT: Negative for congestion and sore throat.    Neck: Negative for painful lymph nodes.   Cardiovascular: Negative for chest pain and leg swelling.   Eyes: Negative for double vision and blurred vision.   Respiratory: Negative for cough and shortness of breath.    Gastrointestinal: Negative for nausea, vomiting and diarrhea.   Genitourinary: Negative for dysuria, frequency and urgency.   Musculoskeletal: Negative for joint pain, joint swelling, muscle cramps and muscle ache.   Skin: Negative for color change, pale and rash.   Allergic/Immunologic: Negative for seasonal allergies.   Neurological: Negative for dizziness, history of vertigo, light-headedness, passing out and headaches.   Hematologic/Lymphatic: Negative for swollen " lymph nodes, easy bruising/bleeding and history of blood clots. Does not bruise/bleed easily.   Psychiatric/Behavioral: Negative for nervous/anxious, sleep disturbance and depression. The patient is not nervous/anxious.        Objective:      Physical Exam   Constitutional: He is oriented to person, place, and time. He appears well-developed and well-nourished. He is cooperative.  Non-toxic appearance. He does not appear ill. No distress.   HENT:   Head: Normocephalic and atraumatic.   Right Ear: Hearing, tympanic membrane, external ear and ear canal normal.   Left Ear: Hearing, tympanic membrane, external ear and ear canal normal.   Nose: Nose normal. No mucosal edema, rhinorrhea or nasal deformity. No epistaxis. Right sinus exhibits no maxillary sinus tenderness and no frontal sinus tenderness. Left sinus exhibits no maxillary sinus tenderness and no frontal sinus tenderness.   Mouth/Throat: Uvula is midline, oropharynx is clear and moist and mucous membranes are normal. No trismus in the jaw. Normal dentition. No uvula swelling. No posterior oropharyngeal erythema.   Eyes: Conjunctivae and lids are normal. Right eye exhibits no discharge. Left eye exhibits no discharge. No scleral icterus.   Sclera clear bilat   Neck: Trachea normal, normal range of motion, full passive range of motion without pain and phonation normal. Neck supple.   Cardiovascular: Normal rate, regular rhythm, normal heart sounds, intact distal pulses and normal pulses.   Pulmonary/Chest: Effort normal and breath sounds normal. No respiratory distress. He has no decreased breath sounds. He has no wheezes. He has no rhonchi. He has no rales.   Right ribcage, slight tenderness. No bruising or abrasions noted.   Abdominal: Soft. Normal appearance and bowel sounds are normal. He exhibits no distension, no pulsatile midline mass and no mass. There is no tenderness.   Musculoskeletal: Normal range of motion. He exhibits no edema or deformity.         Left knee: He exhibits normal range of motion, no swelling and no erythema. No tenderness found.   Neurological: He is alert and oriented to person, place, and time. He exhibits normal muscle tone. Coordination normal.   Skin: Skin is warm and dry. Abrasion and bruising noted. No rash noted. He is not diaphoretic. No pallor.        Psychiatric: He has a normal mood and affect. His speech is normal and behavior is normal. Judgment and thought content normal. Cognition and memory are normal.   Nursing note and vitals reviewed.      Assessment:       1. Fall, initial encounter    2. Abrasion of left knee, initial encounter        Plan:         Fall, initial encounter    Abrasion of left knee, initial encounter  -     mupirocin (BACTROBAN) 2 % ointment; Apply to affected area 3 times daily  Dispense: 22 g; Refill: 1          Patient Instructions     Follow-up with PCP or return to Urgent Care for worsening of symptoms.    Abrasions  Abrasions are skin scrapes. Their treatment depends on how large and deep the abrasion is.  Home care  You may be prescribed an antibiotic cream or ointment to apply to the wound. This helps prevent infection. Follow instructions when using this medicine.  General care  · To care for the abrasion, do the following each day for as long as directed by your healthcare provider.  ¨ If you were given a bandage, change it once a day. If your bandage sticks to the wound, soak it in warm water until it loosens.  ¨ Wash the area with soap and warm water. You may do this in a sink or under a tub faucet or shower. Rinse off the soap. Then pat the area dry with a clean towel.  ¨ If antibiotic ointment or cream was prescribed, reapply it to the wound as directed. Cover the wound with a fresh nonstick bandage. If the bandage becomes wet or dirty, change it as soon as possible.  ¨ Some antibiotic ointments or cream can cause an allergic reaction or dermatitis. This may cause redness, itching and or hives.  If this occurs, stop using the ointment immediately and wash off any remaining ointment. You may need to take some allergy medicine to relieve symptoms.  · You may use acetaminophen or ibuprofen to control pain unless another pain medicine was prescribed. Talk with your healthcare provider before using these medicines if you have chronic liver or kidney disease or ever had a stomach ulcer or GI bleeding. Dont use ibuprofen in children younger than six months old.  · Most skin wounds heal within 10 days. But an infection may occur even with treatment. So its important to watch the wound for signs of infection as listed below.  Follow-up care  Follow up with your healthcare provider, or as advised.  When to seek medical advice  Call your healthcare provider right away if any of these occur:  · Fever of 100.4ºF (38ºC) or higher, or as directed by your healthcare provider  · Increasing pain, redness, swelling, or drainage from the wound  · Bleeding from the wound that does not stop after a few minutes of steady, firm pressure  · Decreased ability to move any body part near the wound  Date Last Reviewed: 3/3/2017  © 4716-7187 [employer]. [address] [place] [address]. All rights reserved. This information is not intended as a substitute for professional medical care. Always follow your healthcare professional's instructions.        Uncertain Causes of Fall  You have had a fall today. But the cause of your fall is not certain. Falls can occur due to slipping, tripping or losing your balance. A fall can also occur from a fainting spell or seizure.  While a fall can happen for a simple reason (tripping over something), falls in elderly people are often caused by a combination of things:  · Age-related decline in function with worsening balance, stability, vision, and muscle strength  · Chronic illness such as heart arrhythmias, heart valve disease, vascular disease, COPD, diabetes, strokes,  arthritis  · Effects or side effects of medicines  · Dehydration.  · Environmental hazards such as uneven or slippery ground, unfamiliar place, obstacles, uneven surfaces, or slippery ground  · Situational factors (related to the activity being done, e.g., rushing to the bathroom)  Because the cause of your fall today is not certain, it is possible that a fainting spell or seizure was the cause. This means that it could happen again, without warning. If you fall again, without a cause, then you should return to this facility promptly to have further tests. Otherwise, follow up with your doctor as explained below.  It is normal to feel sore and tight in your muscles and back the next day, and not just the muscles you initially injured. Remember, all the parts of your body are connected, so while initially one area hurts, the next day another may hurt. Also, when you injure yourself, it causes inflammation, which then causes the muscles to tighten up and hurt more. After the initial worsening, it should gradually improve over the next few days. However, more severe pain should be reported.  Even without a definite head injury, you can still get a concussion. Concussions and even bleeding can still occur, especially if you have had a recent injury or take blood thinner medicine. It is not unusual to have a mild headache and feel tired and even nauseous or dizzy.  Home care  · Rest today and resume your normal activities as soon as you are feeling back to normal. It is best to remain with someone who can check on you for the next 24 hours to watch for another episode of falling.  · If you were injured during the fall, follow the advice from your doctor regarding care of your injury.  ·  If you become light-headed or dizzy, lie down immediately or sit and lean forward with your head down.  · As a precaution, do not drive a car or operate dangerous equipment, do not take a bath alone (use a shower instead) and do not swim  alone until you see your doctor. A condition causing fainting or seizures must be ruled out before resuming these activities.  · You may use acetaminophen or ibuprofen to control pain, unless another pain medicine was prescribed. If you have chronic liver or kidney disease or ever had a stomach ulcer or gastrointestinal bleeding, talk with your doctor before using these medicines.  · Keep your appointments for any further testing that may have been scheduled for you.  Follow-up care  Follow up with your healthcare provider, or as advised.  If X-rays or CT scan were done, you will be notified if there is a change in the reading, especially if it affects treatment.  Call 911  Call 911 if any of these occur:  · Trouble breathing  · Confused or difficulty arousing  · Fainting or loss of consciousness  · Rapid or very slow heart rate  · Seizure  · Difficulty with speech or vision, weakness of an arm or leg  · Difficulty walking or talking, loss of balance, numbness or weakness in one side of your body, facial droop  When to seek medical advice  Call your healthcare provider right away if any of these occur:  · Another unexplained fall  · Dizziness  · Severe headache  · Nausea and vomiting  · Blood in vomit, stools (black or red color)  Date Last Reviewed: 11/5/2015  © 8133-0619 [employer]. [address] [place] [address], [place] [address]. All rights reserved. This information is not intended as a substitute for professional medical care. Always follow your healthcare professional's instructions.      Please return here or go to the Emergency Department for any concerns or worsening of condition.  If you were prescribed antibiotics, please take them to completion.  If you were prescribed a narcotic medication, do not drive or operate heavy equipment or machinery while taking these medications.  Please follow up with your primary care doctor or specialist as needed.    If you  smoke, please stop smoking.

## 2018-11-20 DIAGNOSIS — D68.61 ANTI-PHOSPHOLIPID ANTIBODY SYNDROME: ICD-10-CM

## 2018-11-20 DIAGNOSIS — M32.9 LUPUS (SYSTEMIC LUPUS ERYTHEMATOSUS): ICD-10-CM

## 2018-11-20 DIAGNOSIS — Z79.01 LONG TERM CURRENT USE OF ANTICOAGULANT THERAPY: ICD-10-CM

## 2018-11-21 RX ORDER — WARFARIN SODIUM 5 MG/1
TABLET ORAL
Qty: 195 TABLET | Refills: 0 | Status: SHIPPED | OUTPATIENT
Start: 2018-11-21 | End: 2019-04-17 | Stop reason: SDUPTHER

## 2018-12-06 ENCOUNTER — LAB VISIT (OUTPATIENT)
Dept: LAB | Facility: HOSPITAL | Age: 68
End: 2018-12-06
Attending: INTERNAL MEDICINE
Payer: COMMERCIAL

## 2018-12-06 ENCOUNTER — ANTI-COAG VISIT (OUTPATIENT)
Dept: CARDIOLOGY | Facility: CLINIC | Age: 68
End: 2018-12-06

## 2018-12-06 DIAGNOSIS — Z79.01 LONG TERM CURRENT USE OF ANTICOAGULANT THERAPY: ICD-10-CM

## 2018-12-06 DIAGNOSIS — D68.61 ANTI-PHOSPHOLIPID ANTIBODY SYNDROME: ICD-10-CM

## 2018-12-06 LAB
INR PPP: 3.5
PROTHROMBIN TIME: 34.3 SEC

## 2018-12-06 PROCEDURE — 36415 COLL VENOUS BLD VENIPUNCTURE: CPT | Mod: PO

## 2018-12-06 PROCEDURE — 85610 PROTHROMBIN TIME: CPT

## 2018-12-27 ENCOUNTER — LAB VISIT (OUTPATIENT)
Dept: LAB | Facility: HOSPITAL | Age: 68
End: 2018-12-27
Attending: INTERNAL MEDICINE
Payer: COMMERCIAL

## 2018-12-27 ENCOUNTER — DOCUMENTATION ONLY (OUTPATIENT)
Dept: RHEUMATOLOGY | Facility: CLINIC | Age: 68
End: 2018-12-27

## 2018-12-27 ENCOUNTER — TELEPHONE (OUTPATIENT)
Dept: RHEUMATOLOGY | Facility: CLINIC | Age: 68
End: 2018-12-27

## 2018-12-27 ENCOUNTER — ANTI-COAG VISIT (OUTPATIENT)
Dept: CARDIOLOGY | Facility: CLINIC | Age: 68
End: 2018-12-27

## 2018-12-27 DIAGNOSIS — D68.61 ANTI-PHOSPHOLIPID ANTIBODY SYNDROME: ICD-10-CM

## 2018-12-27 DIAGNOSIS — Z79.01 LONG TERM CURRENT USE OF ANTICOAGULANT THERAPY: ICD-10-CM

## 2018-12-27 LAB
INR PPP: 5.4
PROTHROMBIN TIME: 55.2 SEC

## 2018-12-27 PROCEDURE — 85610 PROTHROMBIN TIME: CPT

## 2018-12-27 PROCEDURE — 36415 COLL VENOUS BLD VENIPUNCTURE: CPT

## 2018-12-27 NOTE — PROGRESS NOTES
Called patient : high INR  He did not   Left a message to call us back  Also suggested in the message that he has to go to the ER asap  He is at high risk of bleeding

## 2018-12-27 NOTE — PROGRESS NOTES
Correct dose of coumadin confirmed.   He reports diarrhea on Sunday   Low grade fever on Monday    Reports eating broccoli on Tues, no diet change    Reports having 1/2 can beer on Saturday;     Denies any double dosing or new meds

## 2018-12-27 NOTE — TELEPHONE ENCOUNTER
[facility]   called in critical INR  PT 55.2  INR 5.4   reported to Dr. William, she is on call, she contacted pt.

## 2018-12-28 NOTE — PROGRESS NOTES
I have reviewed the notes, assessments, and/or procedures performed this visit, and I concur with the documentation.  I have called the patient this morning and asked him to go to the ER

## 2019-01-03 ENCOUNTER — LAB VISIT (OUTPATIENT)
Dept: LAB | Facility: HOSPITAL | Age: 69
End: 2019-01-03
Attending: FAMILY MEDICINE
Payer: COMMERCIAL

## 2019-01-03 DIAGNOSIS — Z79.01 LONG TERM CURRENT USE OF ANTICOAGULANT THERAPY: ICD-10-CM

## 2019-01-03 DIAGNOSIS — D68.61 ANTI-PHOSPHOLIPID ANTIBODY SYNDROME: ICD-10-CM

## 2019-01-03 LAB
INR PPP: 2.4
PROTHROMBIN TIME: 23.2 SEC

## 2019-01-03 PROCEDURE — 85610 PROTHROMBIN TIME: CPT

## 2019-01-03 PROCEDURE — 36415 COLL VENOUS BLD VENIPUNCTURE: CPT | Mod: PO

## 2019-01-04 ENCOUNTER — ANTI-COAG VISIT (OUTPATIENT)
Dept: CARDIOLOGY | Facility: CLINIC | Age: 69
End: 2019-01-04

## 2019-01-04 DIAGNOSIS — Z79.01 LONG TERM CURRENT USE OF ANTICOAGULANT THERAPY: ICD-10-CM

## 2019-01-04 DIAGNOSIS — D68.61 ANTI-PHOSPHOLIPID ANTIBODY SYNDROME: ICD-10-CM

## 2019-01-10 ENCOUNTER — LAB VISIT (OUTPATIENT)
Dept: LAB | Facility: HOSPITAL | Age: 69
End: 2019-01-10
Attending: INTERNAL MEDICINE
Payer: COMMERCIAL

## 2019-01-10 DIAGNOSIS — Z79.01 LONG TERM CURRENT USE OF ANTICOAGULANT THERAPY: ICD-10-CM

## 2019-01-10 DIAGNOSIS — D68.61 ANTI-PHOSPHOLIPID ANTIBODY SYNDROME: ICD-10-CM

## 2019-01-10 LAB
INR PPP: 3.9
PROTHROMBIN TIME: 37.9 SEC

## 2019-01-10 PROCEDURE — 36415 COLL VENOUS BLD VENIPUNCTURE: CPT | Mod: PO

## 2019-01-10 PROCEDURE — 85610 PROTHROMBIN TIME: CPT

## 2019-01-11 ENCOUNTER — ANTI-COAG VISIT (OUTPATIENT)
Dept: CARDIOLOGY | Facility: CLINIC | Age: 69
End: 2019-01-11

## 2019-01-11 DIAGNOSIS — Z79.01 LONG TERM CURRENT USE OF ANTICOAGULANT THERAPY: ICD-10-CM

## 2019-01-11 DIAGNOSIS — D68.61 ANTI-PHOSPHOLIPID ANTIBODY SYNDROME: ICD-10-CM

## 2019-01-17 ENCOUNTER — LAB VISIT (OUTPATIENT)
Dept: LAB | Facility: HOSPITAL | Age: 69
End: 2019-01-17
Attending: INTERNAL MEDICINE
Payer: COMMERCIAL

## 2019-01-17 DIAGNOSIS — D68.61 ANTI-PHOSPHOLIPID ANTIBODY SYNDROME: ICD-10-CM

## 2019-01-17 DIAGNOSIS — Z79.01 LONG TERM CURRENT USE OF ANTICOAGULANT THERAPY: ICD-10-CM

## 2019-01-17 LAB
INR PPP: 4.1
PROTHROMBIN TIME: 40.3 SEC

## 2019-01-17 PROCEDURE — 85610 PROTHROMBIN TIME: CPT

## 2019-01-17 PROCEDURE — 36415 COLL VENOUS BLD VENIPUNCTURE: CPT | Mod: PO

## 2019-01-18 ENCOUNTER — ANTI-COAG VISIT (OUTPATIENT)
Dept: CARDIOLOGY | Facility: CLINIC | Age: 69
End: 2019-01-18

## 2019-01-18 DIAGNOSIS — Z79.01 LONG TERM CURRENT USE OF ANTICOAGULANT THERAPY: ICD-10-CM

## 2019-01-18 DIAGNOSIS — D68.61 ANTI-PHOSPHOLIPID ANTIBODY SYNDROME: ICD-10-CM

## 2019-01-24 ENCOUNTER — ANTI-COAG VISIT (OUTPATIENT)
Dept: CARDIOLOGY | Facility: CLINIC | Age: 69
End: 2019-01-24

## 2019-01-24 ENCOUNTER — LAB VISIT (OUTPATIENT)
Dept: LAB | Facility: HOSPITAL | Age: 69
End: 2019-01-24
Attending: INTERNAL MEDICINE
Payer: COMMERCIAL

## 2019-01-24 DIAGNOSIS — Z79.01 LONG TERM CURRENT USE OF ANTICOAGULANT THERAPY: ICD-10-CM

## 2019-01-24 DIAGNOSIS — D68.61 ANTI-PHOSPHOLIPID ANTIBODY SYNDROME: ICD-10-CM

## 2019-01-24 LAB
INR PPP: 2.6
PROTHROMBIN TIME: 25.6 SEC

## 2019-01-24 PROCEDURE — 85610 PROTHROMBIN TIME: CPT

## 2019-01-24 PROCEDURE — 36415 COLL VENOUS BLD VENIPUNCTURE: CPT | Mod: PO

## 2019-01-31 ENCOUNTER — LAB VISIT (OUTPATIENT)
Dept: LAB | Facility: HOSPITAL | Age: 69
End: 2019-01-31
Attending: INTERNAL MEDICINE
Payer: COMMERCIAL

## 2019-01-31 DIAGNOSIS — Z79.01 LONG TERM CURRENT USE OF ANTICOAGULANT THERAPY: ICD-10-CM

## 2019-01-31 DIAGNOSIS — D68.61 ANTI-PHOSPHOLIPID ANTIBODY SYNDROME: ICD-10-CM

## 2019-01-31 LAB
INR PPP: 2.6
PROTHROMBIN TIME: 25.2 SEC

## 2019-01-31 PROCEDURE — 85610 PROTHROMBIN TIME: CPT

## 2019-01-31 PROCEDURE — 36415 COLL VENOUS BLD VENIPUNCTURE: CPT | Mod: PO

## 2019-02-01 ENCOUNTER — ANTI-COAG VISIT (OUTPATIENT)
Dept: CARDIOLOGY | Facility: CLINIC | Age: 69
End: 2019-02-01

## 2019-02-01 DIAGNOSIS — D68.61 ANTI-PHOSPHOLIPID ANTIBODY SYNDROME: ICD-10-CM

## 2019-02-01 DIAGNOSIS — Z79.01 LONG TERM CURRENT USE OF ANTICOAGULANT THERAPY: ICD-10-CM

## 2019-02-14 ENCOUNTER — LAB VISIT (OUTPATIENT)
Dept: LAB | Facility: HOSPITAL | Age: 69
End: 2019-02-14
Attending: INTERNAL MEDICINE
Payer: COMMERCIAL

## 2019-02-14 DIAGNOSIS — Z79.01 LONG TERM CURRENT USE OF ANTICOAGULANT THERAPY: ICD-10-CM

## 2019-02-14 DIAGNOSIS — D68.61 ANTI-PHOSPHOLIPID ANTIBODY SYNDROME: ICD-10-CM

## 2019-02-14 LAB
INR PPP: 2.2
PROTHROMBIN TIME: 21 SEC

## 2019-02-14 PROCEDURE — 36415 COLL VENOUS BLD VENIPUNCTURE: CPT | Mod: PO

## 2019-02-14 PROCEDURE — 85610 PROTHROMBIN TIME: CPT

## 2019-02-15 ENCOUNTER — ANTI-COAG VISIT (OUTPATIENT)
Dept: CARDIOLOGY | Facility: CLINIC | Age: 69
End: 2019-02-15

## 2019-02-15 DIAGNOSIS — Z79.01 LONG TERM CURRENT USE OF ANTICOAGULANT THERAPY: ICD-10-CM

## 2019-02-15 DIAGNOSIS — D68.61 ANTI-PHOSPHOLIPID ANTIBODY SYNDROME: ICD-10-CM

## 2019-02-15 NOTE — PROGRESS NOTES
Patient advised to take 12.5mg 2/15/19 then resume regular regimen   Advised follow up 2/20/19    Patient verbalized understanding

## 2019-02-15 NOTE — PROGRESS NOTES
Confirmed correct dose    Reports eating a salad Thursday prior to lab     Denies any missed doses, new meds

## 2019-02-19 ENCOUNTER — PATIENT MESSAGE (OUTPATIENT)
Dept: RHEUMATOLOGY | Facility: CLINIC | Age: 69
End: 2019-02-19

## 2019-02-19 DIAGNOSIS — M32.19 OTHER SYSTEMIC LUPUS ERYTHEMATOSUS WITH OTHER ORGAN INVOLVEMENT: ICD-10-CM

## 2019-02-20 ENCOUNTER — LAB VISIT (OUTPATIENT)
Dept: LAB | Facility: HOSPITAL | Age: 69
End: 2019-02-20
Attending: INTERNAL MEDICINE
Payer: COMMERCIAL

## 2019-02-20 DIAGNOSIS — Z79.01 LONG TERM CURRENT USE OF ANTICOAGULANT THERAPY: ICD-10-CM

## 2019-02-20 DIAGNOSIS — D68.61 ANTI-PHOSPHOLIPID ANTIBODY SYNDROME: ICD-10-CM

## 2019-02-20 LAB
INR PPP: 2.6
PROTHROMBIN TIME: 25 SEC

## 2019-02-20 PROCEDURE — 36415 COLL VENOUS BLD VENIPUNCTURE: CPT | Mod: PO

## 2019-02-20 PROCEDURE — 85610 PROTHROMBIN TIME: CPT

## 2019-02-20 RX ORDER — HYDROXYCHLOROQUINE SULFATE 200 MG/1
200 TABLET, FILM COATED ORAL 2 TIMES DAILY
Qty: 180 TABLET | Refills: 1 | Status: SHIPPED | OUTPATIENT
Start: 2019-02-20 | End: 2019-08-26 | Stop reason: SDUPTHER

## 2019-02-21 ENCOUNTER — ANTI-COAG VISIT (OUTPATIENT)
Dept: CARDIOLOGY | Facility: CLINIC | Age: 69
End: 2019-02-21

## 2019-02-21 DIAGNOSIS — D68.61 ANTI-PHOSPHOLIPID ANTIBODY SYNDROME: ICD-10-CM

## 2019-02-21 DIAGNOSIS — Z79.01 LONG TERM CURRENT USE OF ANTICOAGULANT THERAPY: ICD-10-CM

## 2019-02-21 NOTE — PROGRESS NOTES
Patient was given lab result, verified correct dose of coumadin, Patient reports no changes, nothing new, Patient was advised to take coumadin: 10mg daily except 12.5mg Wednesday, and was also given date of next lab draw, patient states understanding but rescheduled 2/27 lab appointment to 3/01/19 due to being out of town 2/27, advised patient to get lab early am on 3/01, appointment booked

## 2019-03-01 ENCOUNTER — LAB VISIT (OUTPATIENT)
Dept: LAB | Facility: HOSPITAL | Age: 69
End: 2019-03-01
Attending: INTERNAL MEDICINE
Payer: COMMERCIAL

## 2019-03-01 ENCOUNTER — ANTI-COAG VISIT (OUTPATIENT)
Dept: CARDIOLOGY | Facility: CLINIC | Age: 69
End: 2019-03-01

## 2019-03-01 DIAGNOSIS — Z79.01 LONG TERM CURRENT USE OF ANTICOAGULANT THERAPY: ICD-10-CM

## 2019-03-01 DIAGNOSIS — D68.61 ANTI-PHOSPHOLIPID ANTIBODY SYNDROME: ICD-10-CM

## 2019-03-01 LAB
INR PPP: 2.9
PROTHROMBIN TIME: 28 SEC

## 2019-03-01 PROCEDURE — 85610 PROTHROMBIN TIME: CPT

## 2019-03-01 PROCEDURE — 36415 COLL VENOUS BLD VENIPUNCTURE: CPT | Mod: PO

## 2019-03-14 ENCOUNTER — LAB VISIT (OUTPATIENT)
Dept: LAB | Facility: HOSPITAL | Age: 69
End: 2019-03-14
Attending: INTERNAL MEDICINE
Payer: COMMERCIAL

## 2019-03-14 DIAGNOSIS — D68.61 ANTI-PHOSPHOLIPID ANTIBODY SYNDROME: ICD-10-CM

## 2019-03-14 DIAGNOSIS — Z79.01 LONG TERM CURRENT USE OF ANTICOAGULANT THERAPY: ICD-10-CM

## 2019-03-14 LAB
INR PPP: 2.5
PROTHROMBIN TIME: 24.2 SEC

## 2019-03-14 PROCEDURE — 36415 COLL VENOUS BLD VENIPUNCTURE: CPT | Mod: PO

## 2019-03-14 PROCEDURE — 85610 PROTHROMBIN TIME: CPT

## 2019-03-15 ENCOUNTER — ANTI-COAG VISIT (OUTPATIENT)
Dept: CARDIOLOGY | Facility: CLINIC | Age: 69
End: 2019-03-15

## 2019-03-15 DIAGNOSIS — D68.61 ANTI-PHOSPHOLIPID ANTIBODY SYNDROME: ICD-10-CM

## 2019-03-15 DIAGNOSIS — Z79.01 LONG TERM CURRENT USE OF ANTICOAGULANT THERAPY: ICD-10-CM

## 2019-03-22 ENCOUNTER — OFFICE VISIT (OUTPATIENT)
Dept: RHEUMATOLOGY | Facility: CLINIC | Age: 69
End: 2019-03-22
Payer: COMMERCIAL

## 2019-03-22 ENCOUNTER — LAB VISIT (OUTPATIENT)
Dept: LAB | Facility: HOSPITAL | Age: 69
End: 2019-03-22
Attending: INTERNAL MEDICINE
Payer: COMMERCIAL

## 2019-03-22 VITALS
WEIGHT: 204.13 LBS | SYSTOLIC BLOOD PRESSURE: 109 MMHG | BODY MASS INDEX: 29.22 KG/M2 | HEART RATE: 93 BPM | HEIGHT: 70 IN | DIASTOLIC BLOOD PRESSURE: 64 MMHG

## 2019-03-22 DIAGNOSIS — M32.19 OTHER SYSTEMIC LUPUS ERYTHEMATOSUS WITH OTHER ORGAN INVOLVEMENT: Chronic | ICD-10-CM

## 2019-03-22 DIAGNOSIS — D68.61 ANTI-PHOSPHOLIPID ANTIBODY SYNDROME: Chronic | ICD-10-CM

## 2019-03-22 DIAGNOSIS — Z79.899 HIGH RISK MEDICATION USE: ICD-10-CM

## 2019-03-22 DIAGNOSIS — M32.19 OTHER SYSTEMIC LUPUS ERYTHEMATOSUS WITH OTHER ORGAN INVOLVEMENT: Primary | Chronic | ICD-10-CM

## 2019-03-22 LAB
BILIRUB UR QL STRIP: NEGATIVE
CLARITY UR REFRACT.AUTO: CLEAR
COLOR UR AUTO: YELLOW
CREAT UR-MCNC: 185 MG/DL
GLUCOSE UR QL STRIP: NEGATIVE
HGB UR QL STRIP: NEGATIVE
KETONES UR QL STRIP: NEGATIVE
LEUKOCYTE ESTERASE UR QL STRIP: NEGATIVE
MICROSCOPIC COMMENT: NORMAL
NITRITE UR QL STRIP: NEGATIVE
PH UR STRIP: 5 [PH] (ref 5–8)
PROT UR QL STRIP: NEGATIVE
PROT UR-MCNC: 12 MG/DL
PROT/CREAT UR: 0.06 MG/G{CREAT}
RBC #/AREA URNS AUTO: 2 /HPF (ref 0–4)
SP GR UR STRIP: 1.02 (ref 1–1.03)
SQUAMOUS #/AREA URNS AUTO: 0 /HPF
URN SPEC COLLECT METH UR: NORMAL
WBC #/AREA URNS AUTO: 1 /HPF (ref 0–5)

## 2019-03-22 PROCEDURE — 1101F PR PT FALLS ASSESS DOC 0-1 FALLS W/OUT INJ PAST YR: ICD-10-PCS | Mod: CPTII,S$GLB,, | Performed by: INTERNAL MEDICINE

## 2019-03-22 PROCEDURE — 84156 ASSAY OF PROTEIN URINE: CPT

## 2019-03-22 PROCEDURE — 81001 URINALYSIS AUTO W/SCOPE: CPT

## 2019-03-22 PROCEDURE — 1101F PT FALLS ASSESS-DOCD LE1/YR: CPT | Mod: CPTII,S$GLB,, | Performed by: INTERNAL MEDICINE

## 2019-03-22 PROCEDURE — 99214 PR OFFICE/OUTPT VISIT, EST, LEVL IV, 30-39 MIN: ICD-10-PCS | Mod: S$GLB,,, | Performed by: INTERNAL MEDICINE

## 2019-03-22 PROCEDURE — 99999 PR PBB SHADOW E&M-EST. PATIENT-LVL III: ICD-10-PCS | Mod: PBBFAC,,, | Performed by: INTERNAL MEDICINE

## 2019-03-22 PROCEDURE — 99214 OFFICE O/P EST MOD 30 MIN: CPT | Mod: S$GLB,,, | Performed by: INTERNAL MEDICINE

## 2019-03-22 PROCEDURE — 99999 PR PBB SHADOW E&M-EST. PATIENT-LVL III: CPT | Mod: PBBFAC,,, | Performed by: INTERNAL MEDICINE

## 2019-03-22 ASSESSMENT — ROUTINE ASSESSMENT OF PATIENT INDEX DATA (RAPID3)
AM STIFFNESS SCORE: 1, YES
PATIENT GLOBAL ASSESSMENT SCORE: 0
WHEN YOU AWAKENED IN THE MORNING OVER THE LAST WEEK, PLEASE INDICATE THE AMOUNT OF TIME IT TAKES UNTIL YOU ARE AS LIMBER AS YOU WILL BE FOR THE DAY: 2-3 MINUTES
PSYCHOLOGICAL DISTRESS SCORE: 0
FATIGUE SCORE: 0
MDHAQ FUNCTION SCORE: 0
PAIN SCORE: 0
TOTAL RAPID3 SCORE: 0

## 2019-03-22 NOTE — PROGRESS NOTES
"Subjective:       Patient ID: Viraj Olsen is a 68 y.o. male.    Chief Complaint: Disease Management    F/u SLE , APLA    Systemic lupus erythematosus since 1995, with neuro-psychiatric lesions in 2006 with recurrence in 2007, treated to remission with hydroxychloroquine, azathioprine, and prednisone.  HCQ since 1995    Hx antiphospholipid with TIA's, on chronic coumadin since 2006     /d  Saw Dr Jorge Estevez for eye check and was normal     Hx colon polyp 2010      Just went to eye doctor and no HCQ maculopathy noted    Review of Systems   Constitutional: Negative for fatigue, fever and unexpected weight change.   HENT: Negative for mouth sores.    Respiratory: Negative for cough and shortness of breath.    Cardiovascular: Negative for chest pain.   Gastrointestinal: Negative for diarrhea.   Genitourinary: Negative for dysuria.   Skin: Negative for rash.   Neurological: Negative for headaches.   Psychiatric/Behavioral: Negative for sleep disturbance.         Objective:   /64   Pulse 93   Ht 5' 10.25" (1.784 m)   Wt 92.6 kg (204 lb 2.3 oz)   BMI 29.08 kg/m²      Physical Exam      Assessment:       1. Other systemic lupus erythematosus with other organ involvement    2. Anti-phospholipid antibody syndrome    3. High risk medication use            Plan:       Problem List Items Addressed This Visit        Active Problems    Anti-phospholipid antibody syndrome (Chronic)     No recurrent thrombosis on warfarin  Tolerating warfarin without AE         High risk medication use     No AE on recent eye check         Lupus (systemic lupus erythematosus) - Primary (Chronic)     No clinical sx of lupus  Needs lab; will get u/a today and plan blood with next warfarin blood draw  Otherwise cont  bid and RTC 1 year with lab           Relevant Orders    Protein / creatinine ratio, urine    Urinalysis            "

## 2019-03-22 NOTE — ASSESSMENT & PLAN NOTE
No clinical sx of lupus  Needs lab; will get u/a today and plan blood with next warfarin blood draw  Otherwise cont  bid and RTC 1 year with lab

## 2019-03-24 ENCOUNTER — PATIENT MESSAGE (OUTPATIENT)
Dept: RHEUMATOLOGY | Facility: CLINIC | Age: 69
End: 2019-03-24

## 2019-03-28 ENCOUNTER — LAB VISIT (OUTPATIENT)
Dept: LAB | Facility: HOSPITAL | Age: 69
End: 2019-03-28
Attending: INTERNAL MEDICINE
Payer: COMMERCIAL

## 2019-03-28 DIAGNOSIS — M32.9 LUPUS (SYSTEMIC LUPUS ERYTHEMATOSUS): Chronic | ICD-10-CM

## 2019-03-28 DIAGNOSIS — Z79.01 LONG TERM CURRENT USE OF ANTICOAGULANT THERAPY: ICD-10-CM

## 2019-03-28 DIAGNOSIS — D68.61 ANTI-PHOSPHOLIPID ANTIBODY SYNDROME: Chronic | ICD-10-CM

## 2019-03-28 DIAGNOSIS — Z79.899 HIGH RISK MEDICATION USE: ICD-10-CM

## 2019-03-28 LAB
25(OH)D3+25(OH)D2 SERPL-MCNC: 74 NG/ML (ref 30–96)
ALBUMIN SERPL BCP-MCNC: 4.1 G/DL (ref 3.5–5.2)
ALP SERPL-CCNC: 60 U/L (ref 55–135)
ALT SERPL W/O P-5'-P-CCNC: 24 U/L (ref 10–44)
ANION GAP SERPL CALC-SCNC: 8 MMOL/L (ref 8–16)
AST SERPL-CCNC: 27 U/L (ref 10–40)
BASOPHILS # BLD AUTO: 0.04 K/UL (ref 0–0.2)
BASOPHILS NFR BLD: 1 % (ref 0–1.9)
BILIRUB SERPL-MCNC: 0.3 MG/DL (ref 0.1–1)
BUN SERPL-MCNC: 20 MG/DL (ref 8–23)
C3 SERPL-MCNC: 73 MG/DL (ref 50–180)
C4 SERPL-MCNC: 11 MG/DL (ref 11–44)
CALCIUM SERPL-MCNC: 10.3 MG/DL (ref 8.7–10.5)
CHLORIDE SERPL-SCNC: 103 MMOL/L (ref 95–110)
CO2 SERPL-SCNC: 31 MMOL/L (ref 23–29)
CREAT SERPL-MCNC: 1.2 MG/DL (ref 0.5–1.4)
CRP SERPL-MCNC: 1.7 MG/L (ref 0–8.2)
DIFFERENTIAL METHOD: ABNORMAL
EOSINOPHIL # BLD AUTO: 0.1 K/UL (ref 0–0.5)
EOSINOPHIL NFR BLD: 2.7 % (ref 0–8)
ERYTHROCYTE [DISTWIDTH] IN BLOOD BY AUTOMATED COUNT: 12.4 % (ref 11.5–14.5)
ERYTHROCYTE [SEDIMENTATION RATE] IN BLOOD BY WESTERGREN METHOD: 3 MM/HR (ref 0–23)
EST. GFR  (AFRICAN AMERICAN): >60 ML/MIN/1.73 M^2
EST. GFR  (NON AFRICAN AMERICAN): >60 ML/MIN/1.73 M^2
GLUCOSE SERPL-MCNC: 74 MG/DL (ref 70–110)
HCT VFR BLD AUTO: 38.7 % (ref 40–54)
HGB BLD-MCNC: 12.6 G/DL (ref 14–18)
IMM GRANULOCYTES # BLD AUTO: 0.01 K/UL (ref 0–0.04)
IMM GRANULOCYTES NFR BLD AUTO: 0.2 % (ref 0–0.5)
INR PPP: 2.6 (ref 0.8–1.2)
LYMPHOCYTES # BLD AUTO: 1.4 K/UL (ref 1–4.8)
LYMPHOCYTES NFR BLD: 34.2 % (ref 18–48)
MCH RBC QN AUTO: 30.4 PG (ref 27–31)
MCHC RBC AUTO-ENTMCNC: 32.6 G/DL (ref 32–36)
MCV RBC AUTO: 94 FL (ref 82–98)
MONOCYTES # BLD AUTO: 0.4 K/UL (ref 0.3–1)
MONOCYTES NFR BLD: 9 % (ref 4–15)
NEUTROPHILS # BLD AUTO: 2.2 K/UL (ref 1.8–7.7)
NEUTROPHILS NFR BLD: 52.9 % (ref 38–73)
NRBC BLD-RTO: 0 /100 WBC
PLATELET # BLD AUTO: 255 K/UL (ref 150–350)
PMV BLD AUTO: 10.2 FL (ref 9.2–12.9)
POTASSIUM SERPL-SCNC: 4.3 MMOL/L (ref 3.5–5.1)
PROT SERPL-MCNC: 7 G/DL (ref 6–8.4)
PROTHROMBIN TIME: 25.3 SEC (ref 9–12.5)
RBC # BLD AUTO: 4.14 M/UL (ref 4.6–6.2)
SODIUM SERPL-SCNC: 142 MMOL/L (ref 136–145)
WBC # BLD AUTO: 4.12 K/UL (ref 3.9–12.7)

## 2019-03-28 PROCEDURE — 86160 COMPLEMENT ANTIGEN: CPT | Mod: 59

## 2019-03-28 PROCEDURE — 86140 C-REACTIVE PROTEIN: CPT

## 2019-03-28 PROCEDURE — 36415 COLL VENOUS BLD VENIPUNCTURE: CPT | Mod: PO

## 2019-03-28 PROCEDURE — 85025 COMPLETE CBC W/AUTO DIFF WBC: CPT

## 2019-03-28 PROCEDURE — 80053 COMPREHEN METABOLIC PANEL: CPT

## 2019-03-28 PROCEDURE — 86160 COMPLEMENT ANTIGEN: CPT

## 2019-03-28 PROCEDURE — 86880 COOMBS TEST DIRECT: CPT

## 2019-03-28 PROCEDURE — 86225 DNA ANTIBODY NATIVE: CPT

## 2019-03-28 PROCEDURE — 85652 RBC SED RATE AUTOMATED: CPT

## 2019-03-28 PROCEDURE — 82306 VITAMIN D 25 HYDROXY: CPT

## 2019-03-28 PROCEDURE — 85610 PROTHROMBIN TIME: CPT

## 2019-03-29 ENCOUNTER — ANTI-COAG VISIT (OUTPATIENT)
Dept: CARDIOLOGY | Facility: CLINIC | Age: 69
End: 2019-03-29

## 2019-03-29 DIAGNOSIS — D68.61 ANTI-PHOSPHOLIPID ANTIBODY SYNDROME: ICD-10-CM

## 2019-03-29 DIAGNOSIS — Z79.01 LONG TERM CURRENT USE OF ANTICOAGULANT THERAPY: ICD-10-CM

## 2019-03-29 LAB
DAT IGG-SP REAG RBC-IMP: NORMAL
DSDNA AB SER-ACNC: NORMAL [IU]/ML

## 2019-04-11 ENCOUNTER — LAB VISIT (OUTPATIENT)
Dept: LAB | Facility: HOSPITAL | Age: 69
End: 2019-04-11
Attending: INTERNAL MEDICINE
Payer: COMMERCIAL

## 2019-04-11 DIAGNOSIS — D68.61 ANTI-PHOSPHOLIPID ANTIBODY SYNDROME: ICD-10-CM

## 2019-04-11 DIAGNOSIS — Z79.01 LONG TERM CURRENT USE OF ANTICOAGULANT THERAPY: ICD-10-CM

## 2019-04-11 LAB
INR PPP: 3.2 (ref 0.8–1.2)
PROTHROMBIN TIME: 30.8 SEC (ref 9–12.5)

## 2019-04-11 PROCEDURE — 36415 COLL VENOUS BLD VENIPUNCTURE: CPT | Mod: PO

## 2019-04-11 PROCEDURE — 85610 PROTHROMBIN TIME: CPT

## 2019-04-12 ENCOUNTER — ANTI-COAG VISIT (OUTPATIENT)
Dept: CARDIOLOGY | Facility: CLINIC | Age: 69
End: 2019-04-12
Payer: COMMERCIAL

## 2019-04-12 DIAGNOSIS — Z79.01 LONG TERM CURRENT USE OF ANTICOAGULANT THERAPY: ICD-10-CM

## 2019-04-12 DIAGNOSIS — D68.61 ANTI-PHOSPHOLIPID ANTIBODY SYNDROME: ICD-10-CM

## 2019-04-12 PROCEDURE — 93793 ANTICOAG MGMT PT WARFARIN: CPT | Mod: S$GLB,,, | Performed by: PHARMACIST

## 2019-04-12 PROCEDURE — 93793 PR ANTICOAGULANT MGMT FOR PT TAKING WARFARIN: ICD-10-PCS | Mod: S$GLB,,, | Performed by: PHARMACIST

## 2019-04-12 NOTE — PROGRESS NOTES
INR at goal.INR range is 2.5-3.5.  Medications and chart reviewed. No changes noted to necessitate adjustment of warfarin or follow-up plan. See calendar.

## 2019-04-17 DIAGNOSIS — D68.61 ANTI-PHOSPHOLIPID ANTIBODY SYNDROME: ICD-10-CM

## 2019-04-17 DIAGNOSIS — M32.9 LUPUS (SYSTEMIC LUPUS ERYTHEMATOSUS): ICD-10-CM

## 2019-04-17 DIAGNOSIS — Z79.01 LONG TERM CURRENT USE OF ANTICOAGULANT THERAPY: ICD-10-CM

## 2019-04-17 RX ORDER — WARFARIN SODIUM 5 MG/1
TABLET ORAL
Qty: 195 TABLET | Refills: 0 | Status: SHIPPED | OUTPATIENT
Start: 2019-04-17 | End: 2020-03-14 | Stop reason: SDUPTHER

## 2019-04-25 ENCOUNTER — LAB VISIT (OUTPATIENT)
Dept: LAB | Facility: HOSPITAL | Age: 69
End: 2019-04-25
Attending: INTERNAL MEDICINE
Payer: COMMERCIAL

## 2019-04-25 DIAGNOSIS — D68.61 ANTI-PHOSPHOLIPID ANTIBODY SYNDROME: ICD-10-CM

## 2019-04-25 DIAGNOSIS — Z79.01 LONG TERM CURRENT USE OF ANTICOAGULANT THERAPY: ICD-10-CM

## 2019-04-25 LAB
INR PPP: 2.7 (ref 0.8–1.2)
PROTHROMBIN TIME: 26.2 SEC (ref 9–12.5)

## 2019-04-25 PROCEDURE — 85610 PROTHROMBIN TIME: CPT

## 2019-04-25 PROCEDURE — 36415 COLL VENOUS BLD VENIPUNCTURE: CPT | Mod: PO

## 2019-04-26 ENCOUNTER — ANTI-COAG VISIT (OUTPATIENT)
Dept: CARDIOLOGY | Facility: CLINIC | Age: 69
End: 2019-04-26
Payer: COMMERCIAL

## 2019-04-26 DIAGNOSIS — Z79.01 LONG TERM CURRENT USE OF ANTICOAGULANT THERAPY: ICD-10-CM

## 2019-04-26 DIAGNOSIS — D68.61 ANTI-PHOSPHOLIPID ANTIBODY SYNDROME: ICD-10-CM

## 2019-04-26 PROCEDURE — 93793 ANTICOAG MGMT PT WARFARIN: CPT | Mod: S$GLB,,,

## 2019-04-26 PROCEDURE — 93793 PR ANTICOAGULANT MGMT FOR PT TAKING WARFARIN: ICD-10-PCS | Mod: S$GLB,,,

## 2019-05-09 ENCOUNTER — ANTI-COAG VISIT (OUTPATIENT)
Dept: CARDIOLOGY | Facility: CLINIC | Age: 69
End: 2019-05-09
Payer: COMMERCIAL

## 2019-05-09 ENCOUNTER — LAB VISIT (OUTPATIENT)
Dept: LAB | Facility: HOSPITAL | Age: 69
End: 2019-05-09
Attending: INTERNAL MEDICINE
Payer: COMMERCIAL

## 2019-05-09 DIAGNOSIS — Z79.01 LONG TERM CURRENT USE OF ANTICOAGULANT THERAPY: ICD-10-CM

## 2019-05-09 DIAGNOSIS — D68.61 ANTI-PHOSPHOLIPID ANTIBODY SYNDROME: ICD-10-CM

## 2019-05-09 LAB
INR PPP: 1.9 (ref 0.8–1.2)
PROTHROMBIN TIME: 18.6 SEC (ref 9–12.5)

## 2019-05-09 PROCEDURE — 85610 PROTHROMBIN TIME: CPT

## 2019-05-09 PROCEDURE — 36415 COLL VENOUS BLD VENIPUNCTURE: CPT | Mod: PO

## 2019-05-09 PROCEDURE — 93793 PR ANTICOAGULANT MGMT FOR PT TAKING WARFARIN: ICD-10-PCS | Mod: S$GLB,,,

## 2019-05-09 PROCEDURE — 93793 ANTICOAG MGMT PT WARFARIN: CPT | Mod: S$GLB,,,

## 2019-05-09 NOTE — PROGRESS NOTES
Patient confirms dose. Denies any changes in medications. Reports he may have eaten more greens recently. Will boost.

## 2019-05-23 ENCOUNTER — ANTI-COAG VISIT (OUTPATIENT)
Dept: CARDIOLOGY | Facility: CLINIC | Age: 69
End: 2019-05-23
Payer: COMMERCIAL

## 2019-05-23 ENCOUNTER — LAB VISIT (OUTPATIENT)
Dept: LAB | Facility: HOSPITAL | Age: 69
End: 2019-05-23
Attending: INTERNAL MEDICINE
Payer: COMMERCIAL

## 2019-05-23 DIAGNOSIS — D68.61 ANTI-PHOSPHOLIPID ANTIBODY SYNDROME: ICD-10-CM

## 2019-05-23 DIAGNOSIS — Z79.01 LONG TERM CURRENT USE OF ANTICOAGULANT THERAPY: ICD-10-CM

## 2019-05-23 LAB
INR PPP: 2.7 (ref 0.8–1.2)
PROTHROMBIN TIME: 26.1 SEC (ref 9–12.5)

## 2019-05-23 PROCEDURE — 85610 PROTHROMBIN TIME: CPT

## 2019-05-23 PROCEDURE — 93793 PR ANTICOAGULANT MGMT FOR PT TAKING WARFARIN: ICD-10-PCS | Mod: S$GLB,,,

## 2019-05-23 PROCEDURE — 36415 COLL VENOUS BLD VENIPUNCTURE: CPT | Mod: PO

## 2019-05-23 PROCEDURE — 93793 ANTICOAG MGMT PT WARFARIN: CPT | Mod: S$GLB,,,

## 2019-05-27 ENCOUNTER — HOSPITAL ENCOUNTER (EMERGENCY)
Facility: HOSPITAL | Age: 69
Discharge: HOME | End: 2019-05-27
Attending: EMERGENCY MEDICINE
Payer: COMMERCIAL

## 2019-05-27 VITALS
TEMPERATURE: 98 F | WEIGHT: 195 LBS | SYSTOLIC BLOOD PRESSURE: 107 MMHG | DIASTOLIC BLOOD PRESSURE: 68 MMHG | OXYGEN SATURATION: 97 % | HEART RATE: 69 BPM | HEIGHT: 71 IN | BODY MASS INDEX: 27.3 KG/M2 | RESPIRATION RATE: 20 BRPM

## 2019-05-27 DIAGNOSIS — M79.606 LEG PAIN: ICD-10-CM

## 2019-05-27 DIAGNOSIS — T14.8XXA BRUISE: Primary | ICD-10-CM

## 2019-05-27 LAB
BASOPHILS # BLD AUTO: 0.01 K/UL (ref 0–0.2)
BASOPHILS NFR BLD: 0.3 % (ref 0–1.9)
DIFFERENTIAL METHOD: ABNORMAL
EOSINOPHIL # BLD AUTO: 0.1 K/UL (ref 0–0.5)
EOSINOPHIL NFR BLD: 3.4 % (ref 0–8)
ERYTHROCYTE [DISTWIDTH] IN BLOOD BY AUTOMATED COUNT: 12.7 % (ref 11.5–14.5)
HCT VFR BLD AUTO: 40.4 % (ref 40–54)
HGB BLD-MCNC: 13.4 G/DL (ref 14–18)
INR PPP: 3.1 (ref 0.8–1.2)
LYMPHOCYTES # BLD AUTO: 1.2 K/UL (ref 1–4.8)
LYMPHOCYTES NFR BLD: 31.1 % (ref 18–48)
MCH RBC QN AUTO: 30.5 PG (ref 27–31)
MCHC RBC AUTO-ENTMCNC: 33.2 G/DL (ref 32–36)
MCV RBC AUTO: 92 FL (ref 82–98)
MONOCYTES # BLD AUTO: 0.5 K/UL (ref 0.3–1)
MONOCYTES NFR BLD: 11.7 % (ref 4–15)
NEUTROPHILS # BLD AUTO: 2.1 K/UL (ref 1.8–7.7)
NEUTROPHILS NFR BLD: 53.2 % (ref 38–73)
PLATELET # BLD AUTO: 247 K/UL (ref 150–350)
PMV BLD AUTO: 9.4 FL (ref 9.2–12.9)
PROTHROMBIN TIME: 32.3 SEC (ref 9–12.5)
RBC # BLD AUTO: 4.39 M/UL (ref 4.6–6.2)
WBC # BLD AUTO: 3.86 K/UL (ref 3.9–12.7)

## 2019-05-27 PROCEDURE — 99284 EMERGENCY DEPT VISIT MOD MDM: CPT | Mod: 25

## 2019-05-27 PROCEDURE — 85610 PROTHROMBIN TIME: CPT

## 2019-05-27 PROCEDURE — 85025 COMPLETE CBC W/AUTO DIFF WBC: CPT

## 2019-05-27 NOTE — ED NOTES
Right leg redness and swelling to right calf area that pt noticed 45 minuets ago. Pt was working in the yard yesterday. Pt denies being bitten by anything, shortness of breath, chest pain palpitations. Pain is a 0 on a scale of 1-10. Pt showing no signs of distress at this time

## 2019-05-27 NOTE — ED PROVIDER NOTES
Encounter Date: 5/27/2019    SCRIBE #1 NOTE: I, Meeta Jarvis, am scribing for, and in the presence of,  Dr. Carroll. I have scribed the entire note.       History     Chief Complaint   Patient presents with    Leg Pain     68 year old male presents to ed cc of right leg pain patient noted this morning right leg had large bruise/ sensitivity to touch around calf states was outside working all day yesterday no chest pain no sob      Viraj Olsen is a 68 y.o. male who  has a past medical history of Anti-phospholipid antibody syndrome (6/21/2012), Cataract, Leukopenia (6/7/2013), Livedo reticularis (6/7/2013), and Lupus.    The patient presents to the ED due to right leg pain. He reports first noticing the pain this morning. The patient crossed his legs and noted bruising his right calf. He notes the pain is in the area of the bruise. The patient denies any injury or trauma to the leg. He does note he was doing yard work yesterday but does not recall striking his leg. The patient denies any associated swelling, open wounds, numbness, tingling or weakness in the legs. Of note the patient is on Coumadin for Lupus. The patient states his INR was checked last week. As per spouse the patient does bruise easily.    The history is provided by the patient and the spouse.     Review of patient's allergies indicates:  No Known Allergies  Past Medical History:   Diagnosis Date    Anti-phospholipid antibody syndrome 6/21/2012    Cataract     Leukopenia 6/7/2013    SLE associated    Livedo reticularis 6/7/2013    Lupus      Past Surgical History:   Procedure Laterality Date    abdondectomy      APPENDECTOMY      COLONOSCOPY N/A 12/28/2016    Performed [facility]    TONSILLECTOMY       Family History   Problem Relation Age of Onset    Hypertension Father     Heart disease Father     Lupus Mother     No Known Problems Sister     No Known Problems Brother     No Known Problems  Maternal Aunt     No Known Problems Maternal Uncle     No Known Problems Paternal Aunt     No Known Problems Paternal Uncle     No Known Problems Maternal Grandmother     No Known Problems Maternal Grandfather     No Known Problems Paternal Grandmother     No Known Problems Paternal Grandfather     Amblyopia Neg Hx     Blindness Neg Hx     Cancer Neg Hx     Cataracts Neg Hx     Diabetes Neg Hx     Glaucoma Neg Hx     Macular degeneration Neg Hx     Retinal detachment Neg Hx     Strabismus Neg Hx     Stroke Neg Hx     Thyroid disease Neg Hx      Social History     Tobacco Use    Smoking status: Never Smoker    Smokeless tobacco: Never Used   Substance Use Topics    Alcohol use: Yes    Drug use: No     Review of Systems   Constitutional: Negative for chills and fever.   HENT: Negative for congestion, ear pain, rhinorrhea and sore throat.    Respiratory: Negative for cough, shortness of breath and wheezing.    Cardiovascular: Negative for chest pain and palpitations.   Gastrointestinal: Negative for abdominal pain, diarrhea, nausea and vomiting.   Genitourinary: Negative for dysuria and hematuria.   Musculoskeletal: Negative for back pain, myalgias and neck pain.        +right leg pain   Skin: Positive for color change (bruise to the right calf). Negative for rash.   Neurological: Negative for dizziness, weakness, light-headedness and headaches.   Psychiatric/Behavioral: Negative for confusion.       Physical Exam     Initial Vitals [05/27/19 0909]   BP Pulse Resp Temp SpO2   117/62 78 20 98.4 °F (36.9 °C) 98 %      MAP       --         Physical Exam    Nursing note and vitals reviewed.  Constitutional: He appears well-developed and well-nourished. He is not diaphoretic. No distress.   HENT:   Head: Normocephalic and atraumatic.   Mouth/Throat: Oropharynx is clear and moist.   Eyes: Conjunctivae and EOM are normal.   Neck: Normal range of motion. Neck supple.   Cardiovascular: Normal rate, regular  rhythm, normal heart sounds and intact distal pulses. Exam reveals no gallop and no friction rub.    No murmur heard.  Pulmonary/Chest: Breath sounds normal. No respiratory distress. He has no wheezes. He has no rhonchi. He has no rales.   Musculoskeletal: Normal range of motion. He exhibits no edema or tenderness.   RLE: Negative Homen's sign. 8 x 16 cm oval area of ecchymosis. No tenderness. No warmth.    Lymphadenopathy:     He has no cervical adenopathy.   Neurological: He is alert and oriented to person, place, and time. He has normal strength. GCS score is 15. GCS eye subscore is 4. GCS verbal subscore is 5. GCS motor subscore is 6.   Skin: Skin is warm and dry. No rash noted.         ED Course   Procedures  Labs Reviewed   CBC W/ AUTO DIFFERENTIAL - Abnormal; Notable for the following components:       Result Value    WBC 3.86 (*)     RBC 4.39 (*)     Hemoglobin 13.4 (*)     All other components within normal limits   PROTIME-INR - Abnormal; Notable for the following components:    Prothrombin Time 32.3 (*)     INR 3.1 (*)     All other components within normal limits          Imaging Results          X-Ray Tibia Fibula 2 View Right (Final result)  Result time 05/27/19 09:53:52    Final result by Mariella Burton MD (05/27/19 09:53:52)                 Impression:      No acute process seen      Electronically signed by: Mariella Burton MD  Date:    05/27/2019  Time:    09:53             Narrative:    EXAMINATION:  XR TIBIA FIBULA 2 VIEW RIGHT    CLINICAL HISTORY:  Pain in leg, unspecified    TECHNIQUE:  AP and lateral views of the right tibia and fibula were performed.    COMPARISON:  None.    FINDINGS:  No fracture, no osseous lesions.  No abnormal periosteal reaction.  Vascular calcifications seen.  Knee joint and ankle joint demonstrate mild degenerative change.                                 Medical Decision Making:   Clinical Tests:   Lab Tests: Ordered and Reviewed  ED Management:  68-year-old  male with what appears to be a large bruise to his right lower leg.  There is no warmth or areas of fluctuance and I do not suspect infection.  Patient does not recall any trauma to the area.  He is currently on Coumadin and has an INR 3.1.  Platelet count is normal. X-ray of the leg is unremarkable. I have suggested the patient watch this area and follow up with his primary physician if it increases.                      Clinical Impression:     1. Bruise    2. Leg pain           I, Dr. Mariano Carroll, personally performed the services described in this documentation. All medical record entries made by the scribe were at my direction and in my presence. I have reviewed the chart and agree that the record reflects my personal performance and is accurate and complete. Mariano Carroll MD.  4:28 PM 05/27/2019                     Mariano Carroll MD  05/27/19 [id number]

## 2019-06-05 ENCOUNTER — LAB VISIT (OUTPATIENT)
Dept: LAB | Facility: HOSPITAL | Age: 69
End: 2019-06-05
Attending: INTERNAL MEDICINE
Payer: COMMERCIAL

## 2019-06-05 DIAGNOSIS — Z79.01 LONG TERM CURRENT USE OF ANTICOAGULANT THERAPY: ICD-10-CM

## 2019-06-05 DIAGNOSIS — D68.61 ANTI-PHOSPHOLIPID ANTIBODY SYNDROME: ICD-10-CM

## 2019-06-05 LAB
INR PPP: 3.2 (ref 0.8–1.2)
PROTHROMBIN TIME: 31.1 SEC (ref 9–12.5)

## 2019-06-05 PROCEDURE — 36415 COLL VENOUS BLD VENIPUNCTURE: CPT | Mod: PO

## 2019-06-05 PROCEDURE — 85610 PROTHROMBIN TIME: CPT

## 2019-06-06 ENCOUNTER — ANTI-COAG VISIT (OUTPATIENT)
Dept: CARDIOLOGY | Facility: CLINIC | Age: 69
End: 2019-06-06
Payer: COMMERCIAL

## 2019-06-06 DIAGNOSIS — D68.61 ANTI-PHOSPHOLIPID ANTIBODY SYNDROME: ICD-10-CM

## 2019-06-06 DIAGNOSIS — Z79.01 LONG TERM CURRENT USE OF ANTICOAGULANT THERAPY: ICD-10-CM

## 2019-06-06 PROCEDURE — 93793 ANTICOAG MGMT PT WARFARIN: CPT | Mod: S$GLB,,,

## 2019-06-06 PROCEDURE — 93793 PR ANTICOAGULANT MGMT FOR PT TAKING WARFARIN: ICD-10-PCS | Mod: S$GLB,,,

## 2019-06-26 ENCOUNTER — LAB VISIT (OUTPATIENT)
Dept: LAB | Facility: HOSPITAL | Age: 69
End: 2019-06-26
Attending: INTERNAL MEDICINE
Payer: COMMERCIAL

## 2019-06-26 DIAGNOSIS — Z79.01 LONG TERM CURRENT USE OF ANTICOAGULANT THERAPY: ICD-10-CM

## 2019-06-26 DIAGNOSIS — D68.61 ANTI-PHOSPHOLIPID ANTIBODY SYNDROME: ICD-10-CM

## 2019-06-26 LAB
INR PPP: 2.8 (ref 0.8–1.2)
PROTHROMBIN TIME: 26.7 SEC (ref 9–12.5)

## 2019-06-26 PROCEDURE — 36415 COLL VENOUS BLD VENIPUNCTURE: CPT | Mod: PO

## 2019-06-26 PROCEDURE — 85610 PROTHROMBIN TIME: CPT

## 2019-06-27 ENCOUNTER — ANTI-COAG VISIT (OUTPATIENT)
Dept: CARDIOLOGY | Facility: CLINIC | Age: 69
End: 2019-06-27
Payer: COMMERCIAL

## 2019-06-27 DIAGNOSIS — D68.61 ANTI-PHOSPHOLIPID ANTIBODY SYNDROME: ICD-10-CM

## 2019-06-27 DIAGNOSIS — Z79.01 LONG TERM CURRENT USE OF ANTICOAGULANT THERAPY: ICD-10-CM

## 2019-06-27 PROCEDURE — 93793 ANTICOAG MGMT PT WARFARIN: CPT | Mod: S$GLB,,,

## 2019-06-27 PROCEDURE — 93793 PR ANTICOAGULANT MGMT FOR PT TAKING WARFARIN: ICD-10-PCS | Mod: S$GLB,,,

## 2019-07-02 DIAGNOSIS — D68.61 ANTI-PHOSPHOLIPID ANTIBODY SYNDROME: ICD-10-CM

## 2019-07-02 DIAGNOSIS — Z79.01 LONG TERM CURRENT USE OF ANTICOAGULANT THERAPY: ICD-10-CM

## 2019-07-02 DIAGNOSIS — M32.9 LUPUS (SYSTEMIC LUPUS ERYTHEMATOSUS): ICD-10-CM

## 2019-07-02 RX ORDER — WARFARIN SODIUM 5 MG/1
TABLET ORAL
Qty: 195 TABLET | Refills: 0 | Status: SHIPPED | OUTPATIENT
Start: 2019-07-02 | End: 2020-04-01

## 2019-07-17 ENCOUNTER — LAB VISIT (OUTPATIENT)
Dept: LAB | Facility: HOSPITAL | Age: 69
End: 2019-07-17
Attending: INTERNAL MEDICINE
Payer: COMMERCIAL

## 2019-07-17 DIAGNOSIS — D68.61 ANTI-PHOSPHOLIPID ANTIBODY SYNDROME: ICD-10-CM

## 2019-07-17 DIAGNOSIS — Z79.01 LONG TERM CURRENT USE OF ANTICOAGULANT THERAPY: ICD-10-CM

## 2019-07-17 LAB
INR PPP: 2.3 (ref 0.8–1.2)
PROTHROMBIN TIME: 22.3 SEC (ref 9–12.5)

## 2019-07-17 PROCEDURE — 85610 PROTHROMBIN TIME: CPT

## 2019-07-17 PROCEDURE — 36415 COLL VENOUS BLD VENIPUNCTURE: CPT | Mod: PO

## 2019-07-18 ENCOUNTER — ANTI-COAG VISIT (OUTPATIENT)
Dept: CARDIOLOGY | Facility: CLINIC | Age: 69
End: 2019-07-18
Payer: COMMERCIAL

## 2019-07-18 DIAGNOSIS — D68.61 ANTI-PHOSPHOLIPID ANTIBODY SYNDROME: ICD-10-CM

## 2019-07-18 DIAGNOSIS — Z79.01 LONG TERM CURRENT USE OF ANTICOAGULANT THERAPY: ICD-10-CM

## 2019-07-18 PROCEDURE — 93793 ANTICOAG MGMT PT WARFARIN: CPT | Mod: S$GLB,,,

## 2019-07-18 PROCEDURE — 93793 PR ANTICOAGULANT MGMT FOR PT TAKING WARFARIN: ICD-10-PCS | Mod: S$GLB,,,

## 2019-07-18 NOTE — PROGRESS NOTES
INR not at goal. Medications, chart, and patient findings reviewed. See calendar for adjustments to dose and follow up plan. Pt denies any changes.  Recommend to give pt 12.5mg 7/18 & resume maintenance dose.  Plan to re-assess in 3 weeks.

## 2019-08-05 ENCOUNTER — LAB VISIT (OUTPATIENT)
Dept: LAB | Facility: HOSPITAL | Age: 69
End: 2019-08-05
Attending: INTERNAL MEDICINE
Payer: COMMERCIAL

## 2019-08-05 DIAGNOSIS — D68.61 ANTI-PHOSPHOLIPID ANTIBODY SYNDROME: ICD-10-CM

## 2019-08-05 DIAGNOSIS — Z79.01 LONG TERM CURRENT USE OF ANTICOAGULANT THERAPY: ICD-10-CM

## 2019-08-05 LAB
INR PPP: 2.4 (ref 0.8–1.2)
PROTHROMBIN TIME: 23.5 SEC (ref 9–12.5)

## 2019-08-05 PROCEDURE — 36415 COLL VENOUS BLD VENIPUNCTURE: CPT | Mod: PO

## 2019-08-05 PROCEDURE — 85610 PROTHROMBIN TIME: CPT

## 2019-08-06 ENCOUNTER — ANTI-COAG VISIT (OUTPATIENT)
Dept: CARDIOLOGY | Facility: CLINIC | Age: 69
End: 2019-08-06
Payer: COMMERCIAL

## 2019-08-06 DIAGNOSIS — D68.61 ANTI-PHOSPHOLIPID ANTIBODY SYNDROME: ICD-10-CM

## 2019-08-06 DIAGNOSIS — Z79.01 LONG TERM CURRENT USE OF ANTICOAGULANT THERAPY: ICD-10-CM

## 2019-08-06 PROCEDURE — 93793 ANTICOAG MGMT PT WARFARIN: CPT | Mod: S$GLB,,,

## 2019-08-06 PROCEDURE — 93793 PR ANTICOAGULANT MGMT FOR PT TAKING WARFARIN: ICD-10-PCS | Mod: S$GLB,,,

## 2019-08-06 NOTE — PROGRESS NOTES
INR not at goal. Medications, chart, and patient findings reviewed. See calendar for adjustments to dose and follow up plan.  Pt denies any changes.  Will increase pt's maintenance regimen & re-assess in 2 weeks.

## 2019-08-22 ENCOUNTER — LAB VISIT (OUTPATIENT)
Dept: LAB | Facility: HOSPITAL | Age: 69
End: 2019-08-22
Attending: INTERNAL MEDICINE
Payer: COMMERCIAL

## 2019-08-22 DIAGNOSIS — D68.61 ANTI-PHOSPHOLIPID ANTIBODY SYNDROME: ICD-10-CM

## 2019-08-22 DIAGNOSIS — Z79.01 LONG TERM CURRENT USE OF ANTICOAGULANT THERAPY: ICD-10-CM

## 2019-08-22 LAB
INR PPP: 3.6 (ref 0.8–1.2)
PROTHROMBIN TIME: 34.9 SEC (ref 9–12.5)

## 2019-08-22 PROCEDURE — 85610 PROTHROMBIN TIME: CPT

## 2019-08-22 PROCEDURE — 36415 COLL VENOUS BLD VENIPUNCTURE: CPT | Mod: PO

## 2019-08-23 ENCOUNTER — ANTI-COAG VISIT (OUTPATIENT)
Dept: CARDIOLOGY | Facility: CLINIC | Age: 69
End: 2019-08-23
Payer: COMMERCIAL

## 2019-08-23 DIAGNOSIS — D68.61 ANTI-PHOSPHOLIPID ANTIBODY SYNDROME: ICD-10-CM

## 2019-08-23 DIAGNOSIS — Z79.01 LONG TERM CURRENT USE OF ANTICOAGULANT THERAPY: ICD-10-CM

## 2019-08-23 PROCEDURE — 93793 ANTICOAG MGMT PT WARFARIN: CPT | Mod: S$GLB,,,

## 2019-08-23 PROCEDURE — 93793 PR ANTICOAGULANT MGMT FOR PT TAKING WARFARIN: ICD-10-PCS | Mod: S$GLB,,,

## 2019-08-26 DIAGNOSIS — M32.19 OTHER SYSTEMIC LUPUS ERYTHEMATOSUS WITH OTHER ORGAN INVOLVEMENT: ICD-10-CM

## 2019-08-26 RX ORDER — HYDROXYCHLOROQUINE SULFATE 200 MG/1
200 TABLET, FILM COATED ORAL 2 TIMES DAILY
Qty: 180 TABLET | Refills: 1 | Status: SHIPPED | OUTPATIENT
Start: 2019-08-26 | End: 2020-02-05

## 2019-09-05 ENCOUNTER — LAB VISIT (OUTPATIENT)
Dept: LAB | Facility: HOSPITAL | Age: 69
End: 2019-09-05
Attending: INTERNAL MEDICINE
Payer: COMMERCIAL

## 2019-09-05 DIAGNOSIS — Z79.01 LONG TERM CURRENT USE OF ANTICOAGULANT THERAPY: ICD-10-CM

## 2019-09-05 DIAGNOSIS — D68.61 ANTI-PHOSPHOLIPID ANTIBODY SYNDROME: ICD-10-CM

## 2019-09-05 LAB
INR PPP: 2.3 (ref 0.8–1.2)
PROTHROMBIN TIME: 22.4 SEC (ref 9–12.5)

## 2019-09-05 PROCEDURE — 85610 PROTHROMBIN TIME: CPT

## 2019-09-05 PROCEDURE — 36415 COLL VENOUS BLD VENIPUNCTURE: CPT | Mod: PO

## 2019-09-06 ENCOUNTER — ANTI-COAG VISIT (OUTPATIENT)
Dept: CARDIOLOGY | Facility: CLINIC | Age: 69
End: 2019-09-06
Payer: COMMERCIAL

## 2019-09-06 DIAGNOSIS — Z79.01 LONG TERM CURRENT USE OF ANTICOAGULANT THERAPY: ICD-10-CM

## 2019-09-06 DIAGNOSIS — D68.61 ANTI-PHOSPHOLIPID ANTIBODY SYNDROME: ICD-10-CM

## 2019-09-06 PROCEDURE — 93793 PR ANTICOAGULANT MGMT FOR PT TAKING WARFARIN: ICD-10-PCS | Mod: S$GLB,,,

## 2019-09-06 PROCEDURE — 93793 ANTICOAG MGMT PT WARFARIN: CPT | Mod: S$GLB,,,

## 2019-09-06 NOTE — PROGRESS NOTES
INR not at goal. Medications, chart, and patient findings reviewed. See calendar for adjustments to dose and follow up plan.  Pt denies any changes.  Will instruct pt to take 15mg 9/6 & resume recently decrease dose. Plan to re-assess in 2 weeks.

## 2019-09-19 ENCOUNTER — LAB VISIT (OUTPATIENT)
Dept: LAB | Facility: HOSPITAL | Age: 69
End: 2019-09-19
Attending: INTERNAL MEDICINE
Payer: COMMERCIAL

## 2019-09-19 DIAGNOSIS — D68.61 ANTI-PHOSPHOLIPID ANTIBODY SYNDROME: ICD-10-CM

## 2019-09-19 DIAGNOSIS — Z79.01 LONG TERM CURRENT USE OF ANTICOAGULANT THERAPY: ICD-10-CM

## 2019-09-19 LAB
INR PPP: 2.9 (ref 0.8–1.2)
PROTHROMBIN TIME: 28.2 SEC (ref 9–12.5)

## 2019-09-19 PROCEDURE — 85610 PROTHROMBIN TIME: CPT

## 2019-09-19 PROCEDURE — 36415 COLL VENOUS BLD VENIPUNCTURE: CPT | Mod: PO

## 2019-09-20 ENCOUNTER — ANTI-COAG VISIT (OUTPATIENT)
Dept: CARDIOLOGY | Facility: CLINIC | Age: 69
End: 2019-09-20
Payer: COMMERCIAL

## 2019-09-20 DIAGNOSIS — Z79.01 LONG TERM CURRENT USE OF ANTICOAGULANT THERAPY: ICD-10-CM

## 2019-09-20 DIAGNOSIS — D68.61 ANTI-PHOSPHOLIPID ANTIBODY SYNDROME: ICD-10-CM

## 2019-09-20 PROCEDURE — 93793 PR ANTICOAGULANT MGMT FOR PT TAKING WARFARIN: ICD-10-PCS | Mod: S$GLB,,,

## 2019-09-20 PROCEDURE — 93793 ANTICOAG MGMT PT WARFARIN: CPT | Mod: S$GLB,,,

## 2019-09-26 DIAGNOSIS — M32.9 LUPUS (SYSTEMIC LUPUS ERYTHEMATOSUS): ICD-10-CM

## 2019-09-26 DIAGNOSIS — Z79.01 LONG TERM CURRENT USE OF ANTICOAGULANT THERAPY: ICD-10-CM

## 2019-09-26 DIAGNOSIS — D68.61 ANTI-PHOSPHOLIPID ANTIBODY SYNDROME: ICD-10-CM

## 2019-09-27 RX ORDER — WARFARIN SODIUM 5 MG/1
TABLET ORAL
Qty: 195 TABLET | Refills: 0 | Status: SHIPPED | OUTPATIENT
Start: 2019-09-27 | End: 2020-04-01

## 2019-10-04 ENCOUNTER — LAB VISIT (OUTPATIENT)
Dept: LAB | Facility: HOSPITAL | Age: 69
End: 2019-10-04
Attending: INTERNAL MEDICINE
Payer: COMMERCIAL

## 2019-10-04 ENCOUNTER — ANTI-COAG VISIT (OUTPATIENT)
Dept: CARDIOLOGY | Facility: CLINIC | Age: 69
End: 2019-10-04
Payer: COMMERCIAL

## 2019-10-04 DIAGNOSIS — D68.61 ANTI-PHOSPHOLIPID ANTIBODY SYNDROME: ICD-10-CM

## 2019-10-04 DIAGNOSIS — Z79.01 LONG TERM CURRENT USE OF ANTICOAGULANT THERAPY: ICD-10-CM

## 2019-10-04 LAB
INR PPP: 2.7 (ref 0.8–1.2)
PROTHROMBIN TIME: 25.8 SEC (ref 9–12.5)

## 2019-10-04 PROCEDURE — 85610 PROTHROMBIN TIME: CPT

## 2019-10-04 PROCEDURE — 36415 COLL VENOUS BLD VENIPUNCTURE: CPT | Mod: PO

## 2019-10-04 PROCEDURE — 93793 ANTICOAG MGMT PT WARFARIN: CPT | Mod: S$GLB,,,

## 2019-10-04 PROCEDURE — 93793 PR ANTICOAGULANT MGMT FOR PT TAKING WARFARIN: ICD-10-PCS | Mod: S$GLB,,,

## 2019-10-17 ENCOUNTER — LAB VISIT (OUTPATIENT)
Dept: LAB | Facility: HOSPITAL | Age: 69
End: 2019-10-17
Attending: INTERNAL MEDICINE
Payer: COMMERCIAL

## 2019-10-17 DIAGNOSIS — Z79.01 LONG TERM CURRENT USE OF ANTICOAGULANT THERAPY: ICD-10-CM

## 2019-10-17 DIAGNOSIS — D68.61 ANTI-PHOSPHOLIPID ANTIBODY SYNDROME: ICD-10-CM

## 2019-10-17 LAB
INR PPP: 3 (ref 0.8–1.2)
PROTHROMBIN TIME: 29.5 SEC (ref 9–12.5)

## 2019-10-17 PROCEDURE — 36415 COLL VENOUS BLD VENIPUNCTURE: CPT | Mod: PO

## 2019-10-17 PROCEDURE — 85610 PROTHROMBIN TIME: CPT

## 2019-10-18 ENCOUNTER — ANTI-COAG VISIT (OUTPATIENT)
Dept: CARDIOLOGY | Facility: CLINIC | Age: 69
End: 2019-10-18
Payer: COMMERCIAL

## 2019-10-18 DIAGNOSIS — D68.61 ANTI-PHOSPHOLIPID ANTIBODY SYNDROME: ICD-10-CM

## 2019-10-18 DIAGNOSIS — Z79.01 LONG TERM CURRENT USE OF ANTICOAGULANT THERAPY: ICD-10-CM

## 2019-10-18 PROCEDURE — 93793 PR ANTICOAGULANT MGMT FOR PT TAKING WARFARIN: ICD-10-PCS | Mod: S$GLB,,,

## 2019-10-18 PROCEDURE — 93793 ANTICOAG MGMT PT WARFARIN: CPT | Mod: S$GLB,,,

## 2019-11-07 ENCOUNTER — LAB VISIT (OUTPATIENT)
Dept: LAB | Facility: HOSPITAL | Age: 69
End: 2019-11-07
Attending: INTERNAL MEDICINE
Payer: COMMERCIAL

## 2019-11-07 DIAGNOSIS — Z79.01 LONG TERM CURRENT USE OF ANTICOAGULANT THERAPY: ICD-10-CM

## 2019-11-07 DIAGNOSIS — D68.61 ANTI-PHOSPHOLIPID ANTIBODY SYNDROME: ICD-10-CM

## 2019-11-07 LAB
INR PPP: 3 (ref 0.8–1.2)
PROTHROMBIN TIME: 29 SEC (ref 9–12.5)

## 2019-11-07 PROCEDURE — 36415 COLL VENOUS BLD VENIPUNCTURE: CPT | Mod: PO

## 2019-11-07 PROCEDURE — 85610 PROTHROMBIN TIME: CPT

## 2019-11-08 ENCOUNTER — ANTI-COAG VISIT (OUTPATIENT)
Dept: CARDIOLOGY | Facility: CLINIC | Age: 69
End: 2019-11-08
Payer: COMMERCIAL

## 2019-11-08 DIAGNOSIS — D68.61 ANTI-PHOSPHOLIPID ANTIBODY SYNDROME: ICD-10-CM

## 2019-11-08 DIAGNOSIS — Z79.01 LONG TERM CURRENT USE OF ANTICOAGULANT THERAPY: ICD-10-CM

## 2019-11-08 PROCEDURE — 93793 ANTICOAG MGMT PT WARFARIN: CPT | Mod: S$GLB,,, | Performed by: PHARMACIST

## 2019-11-08 PROCEDURE — 93793 PR ANTICOAGULANT MGMT FOR PT TAKING WARFARIN: ICD-10-PCS | Mod: S$GLB,,, | Performed by: PHARMACIST

## 2019-12-02 ENCOUNTER — OFFICE VISIT (OUTPATIENT)
Dept: FAMILY MEDICINE | Facility: CLINIC | Age: 69
End: 2019-12-02
Attending: FAMILY MEDICINE
Payer: COMMERCIAL

## 2019-12-02 VITALS
BODY MASS INDEX: 29.13 KG/M2 | OXYGEN SATURATION: 96 % | HEIGHT: 70 IN | HEART RATE: 86 BPM | WEIGHT: 203.5 LBS | DIASTOLIC BLOOD PRESSURE: 70 MMHG | SYSTOLIC BLOOD PRESSURE: 120 MMHG

## 2019-12-02 DIAGNOSIS — M32.9 SYSTEMIC LUPUS ERYTHEMATOSUS, UNSPECIFIED SLE TYPE, UNSPECIFIED ORGAN INVOLVEMENT STATUS: Chronic | ICD-10-CM

## 2019-12-02 DIAGNOSIS — R14.3 FLATULENCE: ICD-10-CM

## 2019-12-02 DIAGNOSIS — M25.361 KNEE INSTABILITY, RIGHT: ICD-10-CM

## 2019-12-02 DIAGNOSIS — Z00.00 ROUTINE GENERAL MEDICAL EXAMINATION AT A HEALTH CARE FACILITY: Primary | ICD-10-CM

## 2019-12-02 DIAGNOSIS — G25.81 RESTLESS LEGS SYNDROME (RLS): Chronic | ICD-10-CM

## 2019-12-02 DIAGNOSIS — Z12.5 ENCOUNTER FOR SCREENING FOR MALIGNANT NEOPLASM OF PROSTATE: ICD-10-CM

## 2019-12-02 DIAGNOSIS — D68.61 ANTI-PHOSPHOLIPID ANTIBODY SYNDROME: Chronic | ICD-10-CM

## 2019-12-02 PROCEDURE — 99387 INIT PM E/M NEW PAT 65+ YRS: CPT | Mod: S$GLB,,, | Performed by: FAMILY MEDICINE

## 2019-12-02 PROCEDURE — 99999 PR PBB SHADOW E&M-EST. PATIENT-LVL III: ICD-10-PCS | Mod: PBBFAC,,, | Performed by: FAMILY MEDICINE

## 2019-12-02 PROCEDURE — 99387 PR PREVENTIVE VISIT,NEW,65 & OVER: ICD-10-PCS | Mod: S$GLB,,, | Performed by: FAMILY MEDICINE

## 2019-12-02 PROCEDURE — 99999 PR PBB SHADOW E&M-EST. PATIENT-LVL III: CPT | Mod: PBBFAC,,, | Performed by: FAMILY MEDICINE

## 2019-12-02 RX ORDER — DICYCLOMINE HYDROCHLORIDE 10 MG/1
10 CAPSULE ORAL
Qty: 20 CAPSULE | Refills: 0 | Status: SHIPPED | OUTPATIENT
Start: 2019-12-02 | End: 2020-01-01

## 2019-12-02 NOTE — PROGRESS NOTES
Subjective:       Patient ID: Viraj Olsen is a 69 y.o. male.    Chief Complaint: Annual Exam    69 yr old pleasant white male with antiphospholipid antibody syndrome, SLE, RLS, and no other significant chronic medical history presents today as new patient and establishing primary care and annual wellness exam and lab work. C/o increase flatulence and knee instability and would like spoecialist.    APAS/SLE - diagnosed about 20-21 years ago - on coumadin and plaquenil since then - had multiple TIA prior to being diagnosed with APAS 21 years ago - no issues - follows Rheumatology      RLS - controlled       History as below - reviewed in detail      HM  -labs due  -PSA due          Review of Systems   Constitutional: Negative.  Negative for activity change, diaphoresis and unexpected weight change.   HENT: Negative.  Negative for congestion, ear pain, mouth sores, rhinorrhea and voice change.    Eyes: Negative.  Negative for pain, discharge and visual disturbance.   Respiratory: Negative.  Negative for apnea, cough and wheezing.    Cardiovascular: Negative.  Negative for chest pain and palpitations.   Gastrointestinal: Negative.  Negative for abdominal distention, anal bleeding, diarrhea and vomiting.   Endocrine: Negative.  Negative for cold intolerance and polyuria.   Genitourinary: Negative.  Negative for decreased urine volume, difficulty urinating, discharge, frequency and scrotal swelling.   Musculoskeletal: Negative.  Negative for back pain, myalgias and neck stiffness.   Skin: Negative.  Negative for color change and rash.   Allergic/Immunologic: Negative.  Negative for environmental allergies and immunocompromised state.   Neurological: Negative.  Negative for dizziness, speech difficulty, weakness and light-headedness.   Hematological: Negative.    Psychiatric/Behavioral: Negative.  Negative for agitation, dysphoric mood and suicidal ideas. The patient is not nervous/anxious.        Active Ambulatory  Problems     Diagnosis Date Noted    Lupus (systemic lupus erythematosus) 06/21/2012    Anti-phospholipid antibody syndrome 06/21/2012    Long term current use of anticoagulant therapy 08/02/2012    High risk medication use 05/24/2013    Restless legs syndrome (RLS) 06/07/2013    Livedo reticularis 06/07/2013    Lymphopenia 10/13/2016    Screening for colon cancer 12/28/2016     Resolved Ambulatory Problems     Diagnosis Date Noted    Impending cerebrovascular accident 06/21/2012    Leukopenia 06/07/2013     Past Medical History:   Diagnosis Date    Cataract     Lupus      Past Surgical History:   Procedure Laterality Date    abdondectomy      APPENDECTOMY      COLONOSCOPY N/A 12/28/2016    Procedure: COLONOSCOPY;  Surgeon: Hillary García MD;  [facility];  Service: Endoscopy;  Laterality: N/A;    TONSILLECTOMY       Family History   Problem Relation Age of Onset    Hypertension Father     Heart disease Father     Lupus Mother     No Known Problems Sister     No Known Problems Brother     No Known Problems Maternal Aunt     No Known Problems Maternal Uncle     No Known Problems Paternal Aunt     No Known Problems Paternal Uncle     No Known Problems Maternal Grandmother     No Known Problems Maternal Grandfather     No Known Problems Paternal Grandmother     No Known Problems Paternal Grandfather     Amblyopia Neg Hx     Blindness Neg Hx     Cancer Neg Hx     Cataracts Neg Hx     Diabetes Neg Hx     Glaucoma Neg Hx     Macular degeneration Neg Hx     Retinal detachment Neg Hx     Strabismus Neg Hx     Stroke Neg Hx     Thyroid disease Neg Hx      Social History     Socioeconomic History    Marital status:      Spouse name: Not on file    Number of children: Not on file    Years of education: Not on file    Highest education level: Not on file   Occupational History    Not on file   Social Needs    Financial resource strain: Not on file    Food  insecurity:     Worry: Not on file     Inability: Not on file    Transportation needs:     Medical: Not on file     Non-medical: Not on file   Tobacco Use    Smoking status: Never Smoker    Smokeless tobacco: Never Used   Substance and Sexual Activity    Alcohol use: Yes     Comment: social    Drug use: No    Sexual activity: Yes     Partners: Female   Lifestyle    Physical activity:     Days per week: Not on file     Minutes per session: Not on file    Stress: Not at all   Relationships    Social connections:     Talks on phone: Not on file     Gets together: Not on file     Attends Mosque service: Not on file     Active member of club or organization: Not on file     Attends meetings of clubs or organizations: Not on file     Relationship status: Not on file   Other Topics Concern    Not on file   Social History Narrative    Not on file     Review of patient's allergies indicates:  No Known Allergies  Current Outpatient Medications on File Prior to Visit   Medication Sig Dispense Refill    aspirin (ECOTRIN) 81 MG EC tablet Take 81 mg by mouth once daily.      b complex vitamins capsule Take 1 capsule by mouth once daily.      calcium carbonate 220 mg capsule Take 250 mg by mouth 2 (two) times daily with meals.      CALCIUM CARBONATE/VITAMIN D3 (VITAMIN D-3 ORAL) Take 1 capsule by mouth once daily.      ferrous sulfate 325 (65 FE) MG EC tablet Take 325 mg by mouth once daily.      fish oil-omega-3 fatty acids 300-1,000 mg capsule Take 2 g by mouth once daily.      FLUZONE HIGH-DOSE 2018-19, PF, 180 mcg/0.5 mL vaccine ADM 0.5ML IM UTD  0    hydroxychloroquine (PLAQUENIL) 200 mg tablet Take 1 tablet (200 mg total) by mouth 2 (two) times daily. 180 tablet 1    melatonin 3 mg Tab       multivitamin (MULTIVITAMIN) per tablet Take 1 tablet by mouth once daily.      mupirocin (BACTROBAN) 2 % ointment Apply to affected area 3 times daily 22 g 1    TRANSDERM-SCOP 1 mg over 3 days UNW AND PHU UTD  1     warfarin (JANTOVEN) 5 MG tablet FOR DIRECTIONS ON HOW TO   TAKE THIS MEDICINE, READ   THE ENCLOSED MEDICATION    INFORMATION FORM 195 tablet 0    warfarin (JANTOVEN) 5 MG tablet FOR DIRECTIONS ON HOW TO   TAKE THIS MEDICINE, READ   THE ENCLOSED MEDICATION    INFORMATION FORM 195 tablet 0     No current facility-administered medications on file prior to visit.        Objective:       Vitals:    12/02/19 1528   BP: 120/70   Pulse: 86       Physical Exam   Constitutional: He is oriented to person, place, and time. He appears well-developed and well-nourished.   HENT:   Head: Normocephalic and atraumatic.   Right Ear: External ear normal.   Left Ear: External ear normal.   Nose: Nose normal.   Mouth/Throat: Oropharynx is clear and moist. No oropharyngeal exudate.   Eyes: Pupils are equal, round, and reactive to light. Conjunctivae and EOM are normal. Right eye exhibits no discharge. Left eye exhibits no discharge. No scleral icterus.   Neck: Normal range of motion. Neck supple. No JVD present. No tracheal deviation present. No thyromegaly present.   Cardiovascular: Normal rate, regular rhythm, normal heart sounds and intact distal pulses. Exam reveals no gallop and no friction rub.   No murmur heard.  Pulmonary/Chest: Effort normal and breath sounds normal. No stridor. No respiratory distress. He has no wheezes. He has no rales. He exhibits no tenderness.   Abdominal: Soft. Bowel sounds are normal. He exhibits no distension and no mass. There is no tenderness. There is no rebound and no guarding. No hernia.   Musculoskeletal: Normal range of motion. He exhibits no edema or tenderness.   Lymphadenopathy:     He has no cervical adenopathy.   Neurological: He is alert and oriented to person, place, and time. He has normal reflexes. He displays normal reflexes. No cranial nerve deficit. He exhibits normal muscle tone. Coordination normal.   Skin: Skin is warm and dry. No rash noted. No erythema. No pallor.    Psychiatric: He has a normal mood and affect. His behavior is normal. Judgment and thought content normal.       Assessment:       1. Routine general medical examination at a health care facility    2. Systemic lupus erythematosus, unspecified SLE type, unspecified organ involvement status    3. Restless legs syndrome (RLS)    4. Anti-phospholipid antibody syndrome    5. Encounter for screening for malignant neoplasm of prostate    6. Flatulence    7. Knee instability, right        Plan:           Viraj was seen today for annual exam.    Diagnoses and all orders for this visit:    Routine general medical examination at a health care facility  -     CBC auto differential; Future  -     Comprehensive metabolic panel; Future  -     Lipid panel; Future    Systemic lupus erythematosus, unspecified SLE type, unspecified organ involvement status    Restless legs syndrome (RLS)    Anti-phospholipid antibody syndrome    Encounter for screening for malignant neoplasm of prostate  -     PSA, Screening; Future    Flatulence  -     dicyclomine (BENTYL) 10 MG capsule; Take 1 capsule (10 mg total) by mouth 4 (four) times daily before meals and nightly.    Knee instability, right  -     Ambulatory referral/consult to Orthopedics; Future        Wellness check  -normal exam  -labs    Left knee instability  -refer ortho    Flatulence  -trial of bentyl prn    APAS/LUPUS  -on plaquenil and coumadin  -follows Rheumatology    Spent adequate time in obtaining history and detailed exam      Follow up in about 1 year (around 12/2/2020), or if symptoms worsen or fail to improve.

## 2019-12-03 ENCOUNTER — LAB VISIT (OUTPATIENT)
Dept: LAB | Facility: HOSPITAL | Age: 69
End: 2019-12-03
Attending: FAMILY MEDICINE
Payer: COMMERCIAL

## 2019-12-03 DIAGNOSIS — Z12.5 ENCOUNTER FOR SCREENING FOR MALIGNANT NEOPLASM OF PROSTATE: ICD-10-CM

## 2019-12-03 DIAGNOSIS — Z00.00 ROUTINE GENERAL MEDICAL EXAMINATION AT A HEALTH CARE FACILITY: ICD-10-CM

## 2019-12-03 LAB
ALBUMIN SERPL BCP-MCNC: 4.3 G/DL (ref 3.5–5.2)
ALP SERPL-CCNC: 63 U/L (ref 55–135)
ALT SERPL W/O P-5'-P-CCNC: 32 U/L (ref 10–44)
ANION GAP SERPL CALC-SCNC: 8 MMOL/L (ref 8–16)
AST SERPL-CCNC: 31 U/L (ref 10–40)
BASOPHILS # BLD AUTO: 0.01 K/UL (ref 0–0.2)
BASOPHILS NFR BLD: 0.2 % (ref 0–1.9)
BILIRUB SERPL-MCNC: 0.5 MG/DL (ref 0.1–1)
BUN SERPL-MCNC: 19 MG/DL (ref 8–23)
CALCIUM SERPL-MCNC: 10.8 MG/DL (ref 8.7–10.5)
CHLORIDE SERPL-SCNC: 102 MMOL/L (ref 95–110)
CHOLEST SERPL-MCNC: 206 MG/DL (ref 120–199)
CHOLEST/HDLC SERPL: 5 {RATIO} (ref 2–5)
CO2 SERPL-SCNC: 31 MMOL/L (ref 23–29)
COMPLEXED PSA SERPL-MCNC: 0.63 NG/ML (ref 0–4)
CREAT SERPL-MCNC: 1.2 MG/DL (ref 0.5–1.4)
DIFFERENTIAL METHOD: ABNORMAL
EOSINOPHIL # BLD AUTO: 0.1 K/UL (ref 0–0.5)
EOSINOPHIL NFR BLD: 3.2 % (ref 0–8)
ERYTHROCYTE [DISTWIDTH] IN BLOOD BY AUTOMATED COUNT: 12.8 % (ref 11.5–14.5)
EST. GFR  (AFRICAN AMERICAN): >60 ML/MIN/1.73 M^2
EST. GFR  (NON AFRICAN AMERICAN): >60 ML/MIN/1.73 M^2
GLUCOSE SERPL-MCNC: 85 MG/DL (ref 70–110)
HCT VFR BLD AUTO: 41.9 % (ref 40–54)
HDLC SERPL-MCNC: 41 MG/DL (ref 40–75)
HDLC SERPL: 19.9 % (ref 20–50)
HGB BLD-MCNC: 13.8 G/DL (ref 14–18)
LDLC SERPL CALC-MCNC: 143.2 MG/DL (ref 63–159)
LYMPHOCYTES # BLD AUTO: 1.4 K/UL (ref 1–4.8)
LYMPHOCYTES NFR BLD: 31.4 % (ref 18–48)
MCH RBC QN AUTO: 30.5 PG (ref 27–31)
MCHC RBC AUTO-ENTMCNC: 32.9 G/DL (ref 32–36)
MCV RBC AUTO: 93 FL (ref 82–98)
MONOCYTES # BLD AUTO: 0.5 K/UL (ref 0.3–1)
MONOCYTES NFR BLD: 10.8 % (ref 4–15)
NEUTROPHILS # BLD AUTO: 2.4 K/UL (ref 1.8–7.7)
NEUTROPHILS NFR BLD: 54.4 % (ref 38–73)
NONHDLC SERPL-MCNC: 165 MG/DL
PLATELET # BLD AUTO: 245 K/UL (ref 150–350)
PMV BLD AUTO: 9.7 FL (ref 9.2–12.9)
POTASSIUM SERPL-SCNC: 4.4 MMOL/L (ref 3.5–5.1)
PROT SERPL-MCNC: 7.4 G/DL (ref 6–8.4)
RBC # BLD AUTO: 4.53 M/UL (ref 4.6–6.2)
SODIUM SERPL-SCNC: 141 MMOL/L (ref 136–145)
TRIGL SERPL-MCNC: 109 MG/DL (ref 30–150)
WBC # BLD AUTO: 4.37 K/UL (ref 3.9–12.7)

## 2019-12-03 PROCEDURE — 36415 COLL VENOUS BLD VENIPUNCTURE: CPT

## 2019-12-03 PROCEDURE — 80061 LIPID PANEL: CPT

## 2019-12-03 PROCEDURE — 80053 COMPREHEN METABOLIC PANEL: CPT

## 2019-12-03 PROCEDURE — 85025 COMPLETE CBC W/AUTO DIFF WBC: CPT

## 2019-12-03 PROCEDURE — 84153 ASSAY OF PSA TOTAL: CPT

## 2019-12-05 ENCOUNTER — ANTI-COAG VISIT (OUTPATIENT)
Dept: CARDIOLOGY | Facility: CLINIC | Age: 69
End: 2019-12-05
Payer: COMMERCIAL

## 2019-12-05 ENCOUNTER — LAB VISIT (OUTPATIENT)
Dept: LAB | Facility: HOSPITAL | Age: 69
End: 2019-12-05
Attending: INTERNAL MEDICINE
Payer: COMMERCIAL

## 2019-12-05 DIAGNOSIS — D68.61 ANTI-PHOSPHOLIPID ANTIBODY SYNDROME: ICD-10-CM

## 2019-12-05 DIAGNOSIS — Z79.01 LONG TERM CURRENT USE OF ANTICOAGULANT THERAPY: ICD-10-CM

## 2019-12-05 LAB
INR PPP: 3 (ref 0.8–1.2)
PROTHROMBIN TIME: 29.2 SEC (ref 9–12.5)

## 2019-12-05 PROCEDURE — 93793 PR ANTICOAGULANT MGMT FOR PT TAKING WARFARIN: ICD-10-PCS | Mod: S$GLB,,,

## 2019-12-05 PROCEDURE — 36415 COLL VENOUS BLD VENIPUNCTURE: CPT | Mod: PO

## 2019-12-05 PROCEDURE — 85610 PROTHROMBIN TIME: CPT

## 2019-12-05 PROCEDURE — 93793 ANTICOAG MGMT PT WARFARIN: CPT | Mod: S$GLB,,,

## 2019-12-06 ENCOUNTER — HOSPITAL ENCOUNTER (OUTPATIENT)
Dept: RADIOLOGY | Facility: HOSPITAL | Age: 69
Discharge: HOME | End: 2019-12-06
Attending: ORTHOPAEDIC SURGERY
Payer: COMMERCIAL

## 2019-12-06 ENCOUNTER — OFFICE VISIT (OUTPATIENT)
Dept: ORTHOPEDICS | Facility: CLINIC | Age: 69
End: 2019-12-06
Payer: COMMERCIAL

## 2019-12-06 VITALS — WEIGHT: 201.63 LBS | BODY MASS INDEX: 28.87 KG/M2 | HEIGHT: 70 IN

## 2019-12-06 DIAGNOSIS — M25.361 KNEE INSTABILITY, RIGHT: Primary | ICD-10-CM

## 2019-12-06 DIAGNOSIS — M25.561 RIGHT KNEE PAIN, UNSPECIFIED CHRONICITY: ICD-10-CM

## 2019-12-06 DIAGNOSIS — M25.561 RIGHT KNEE PAIN, UNSPECIFIED CHRONICITY: Primary | ICD-10-CM

## 2019-12-06 PROCEDURE — 73562 X-RAY EXAM OF KNEE 3: CPT | Mod: TC,LT

## 2019-12-06 PROCEDURE — 99999 PR PBB SHADOW E&M-EST. PATIENT-LVL IV: ICD-10-PCS | Mod: PBBFAC,,, | Performed by: ORTHOPAEDIC SURGERY

## 2019-12-06 PROCEDURE — 1101F PR PT FALLS ASSESS DOC 0-1 FALLS W/OUT INJ PAST YR: ICD-10-PCS | Mod: CPTII,S$GLB,, | Performed by: ORTHOPAEDIC SURGERY

## 2019-12-06 PROCEDURE — 99213 OFFICE O/P EST LOW 20 MIN: CPT | Mod: S$GLB,,, | Performed by: ORTHOPAEDIC SURGERY

## 2019-12-06 PROCEDURE — 1101F PT FALLS ASSESS-DOCD LE1/YR: CPT | Mod: CPTII,S$GLB,, | Performed by: ORTHOPAEDIC SURGERY

## 2019-12-06 PROCEDURE — 1126F PR PAIN SEVERITY QUANTIFIED, NO PAIN PRESENT: ICD-10-PCS | Mod: S$GLB,,, | Performed by: ORTHOPAEDIC SURGERY

## 2019-12-06 PROCEDURE — 1159F PR MEDICATION LIST DOCUMENTED IN MEDICAL RECORD: ICD-10-PCS | Mod: S$GLB,,, | Performed by: ORTHOPAEDIC SURGERY

## 2019-12-06 PROCEDURE — 73564 XR KNEE ORTHO RIGHT WITH FLEXION: ICD-10-PCS | Mod: 26,RT,, | Performed by: RADIOLOGY

## 2019-12-06 PROCEDURE — 1159F MED LIST DOCD IN RCRD: CPT | Mod: S$GLB,,, | Performed by: ORTHOPAEDIC SURGERY

## 2019-12-06 PROCEDURE — 73564 X-RAY EXAM KNEE 4 OR MORE: CPT | Mod: 26,RT,, | Performed by: RADIOLOGY

## 2019-12-06 PROCEDURE — 99213 PR OFFICE/OUTPT VISIT, EST, LEVL III, 20-29 MIN: ICD-10-PCS | Mod: S$GLB,,, | Performed by: ORTHOPAEDIC SURGERY

## 2019-12-06 PROCEDURE — 1126F AMNT PAIN NOTED NONE PRSNT: CPT | Mod: S$GLB,,, | Performed by: ORTHOPAEDIC SURGERY

## 2019-12-06 PROCEDURE — 99999 PR PBB SHADOW E&M-EST. PATIENT-LVL IV: CPT | Mod: PBBFAC,,, | Performed by: ORTHOPAEDIC SURGERY

## 2019-12-06 NOTE — PROGRESS NOTES
Subjective:     HPI:   Viraj Olsen is a 69 y.o. male who presents for evaluation of right knee instability    Saw Dr. Reyes September 2018 who documented a 3 week history of right knee pain and diagnosed him with iliotibial band syndrome and did a targeted knee injection    He saw his primary care doctor on December 2nd documented complaints of knee instability.    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 About 10 months ago he did some gardening and had some increasing pain in the knee that got better.  He has had 1 fall in March and another fall a couple weeks ago he says he feels like there is some instability when he stepped down off a platform or a ladder.  He does not have any significant pain in the knee during normal activities but some occasional discomfort at night    He says he took some Tylenol for 5 days ago seems rare.  Injections as described above none since.  No therapy no bracing assistive devices.  He can walk over a mile he says he has no limitations due the knee but he might have a hard time running    He lives with his wife who is a nurse she is now an erectile dysfunction implant device rep  He works as a  at a [employer] planned he wants to work for 3 more years       ROS:  The updated medical history is in the chart and has been reviewed. A review of systems is updated and there is no reported vision changes, ear/nose/mouth/throat complaints,  chest pain, shortness of breath, abdominal pain, urological complaints, fevers or chills, psychiatric complaints. Musculoskeletal and neurologcial symptoms are as documented. All other systems are negative.      Objective:   Exam:  There were no vitals filed for this visit.  Body mass index is 28.93 kg/m².    Physical examination included assessment of the patient's general appearance with particular attention to  development, nutrition, body habitus, attention to grooming, and any evidence of distress.  Constitutional: The patient is a well-developed, well-nourished patient in no acute distress.   Cardiovascular: Vascular examination included warmth and capillary refill as well inspection for edema and assessment of pedal pulses. Pulses are palpable and regular.  Musculoskeletal: Gait was assessed as to whether it was steady, non-antalgic, and/or required the use of an assist device. The patient was also asked to walk independently and get onto the examination table.  Skin: The skin was examined for any obvious rashes or lesions in the extremity.  Neurologic: Sensation is intact to light touch in the extremity. The patient has good coordination without hyperreflexia and is alert and oriented to person, place and time and has normal mood and affect.     All of the above were examined and found to be within normal limits except for the following pertinent clinical findings:    No limp nonantalgic gait 0-135 degree knee range of motion 5° valgus alignment knee stable to anterior-posterior varus and valgus stresses no anterior drawer no pivot shift no Lachman's no effusion nontender to palpation medial lateral patellofemoral joint line negative Joseph sign no pain with passive range of motion hip negative straight leg raise, 5/5 quad strength      Imaging:  Indication:  Right knee pain  Exam Ordered: Radiographs of the right knee include a standing anteroposterior view, a standing posterioanterior view, a lateral view in flexion, and a sunrise view  Details of Examination: Today's exam shows no evidence of joint space narrowing, fracture or dislocation.  No other significant findings are noted.  Impression: Normal Exam, Right knee  Minimal symmetric degenerative changes age appropriate      Assessment:     Knee instability, right [M25.361]  Essentially normal exam and x-rays    Lupus on Plaquenil  Anti phospholipid syndrome  history of multiple TIAs no DVT or PTE, on Coumadin     Plan:       I do not have a great explanation for his symptoms.  He has essentially got a normal exam and no significant x-ray findings.    At this point I would recommend a course of physical therapy also given some knee home exercise conditioning program to do for his knee in till therapy starts will set him up with a hinged knee brace for some added stability.    If symptoms persist he can call us back and we can order an MRI of the right knee    Orders Placed This Encounter   Procedures    Ambulatory referral to Physical Therapy     Referral Priority:   Routine     Referral Type:   Physical Medicine     Referral Reason:   Specialty Services Required     Requested Specialty:   Physical Therapy     Number of Visits Requested:   1       Past Medical History:   Diagnosis Date    Anti-phospholipid antibody syndrome 6/21/2012    Cataract     Leukopenia 6/7/2013    SLE associated    Livedo reticularis 6/7/2013    Lupus        Past Surgical History:   Procedure Laterality Date    abdondectomy      APPENDECTOMY      COLONOSCOPY N/A 12/28/2016    Procedure: COLONOSCOPY;  Surgeon: Hillary García MD;  Location: [facility];  Service: Endoscopy;  Laterality: N/A;    TONSILLECTOMY         Family History   Problem Relation Age of Onset    Hypertension Father     Heart disease Father     Lupus Mother     No Known Problems Sister     No Known Problems Brother     No Known Problems Maternal Aunt     No Known Problems Maternal Uncle     No Known Problems Paternal Aunt     No Known Problems Paternal Uncle     No Known Problems Maternal Grandmother     No Known Problems Maternal Grandfather     No Known Problems Paternal Grandmother     No Known Problems Paternal Grandfather     Amblyopia Neg Hx     Blindness Neg Hx     Cancer Neg Hx     Cataracts Neg Hx     Diabetes Neg Hx     Glaucoma Neg Hx     Macular degeneration Neg Hx     Retinal  detachment Neg Hx     Strabismus Neg Hx     Stroke Neg Hx     Thyroid disease Neg Hx        Social History     Socioeconomic History    Marital status:      Spouse name: Not on file    Number of children: Not on file    Years of education: Not on file    Highest education level: Not on file   Occupational History    Not on file   Social Needs    Financial resource strain: Not on file    Food insecurity:     Worry: Not on file     Inability: Not on file    Transportation needs:     Medical: Not on file     Non-medical: Not on file   Tobacco Use    Smoking status: Never Smoker    Smokeless tobacco: Never Used   Substance and Sexual Activity    Alcohol use: Yes     Comment: social    Drug use: No    Sexual activity: Yes     Partners: Female   Lifestyle    Physical activity:     Days per week: Not on file     Minutes per session: Not on file    Stress: Not at all   Relationships    Social connections:     Talks on phone: Not on file     Gets together: Not on file     Attends Yazdanism service: Not on file     Active member of club or organization: Not on file     Attends meetings of clubs or organizations: Not on file     Relationship status: Not on file   Other Topics Concern    Not on file   Social History Narrative    Not on file

## 2019-12-06 NOTE — LETTER
December 6, 2019      Mitch Rojas MD  [address]  [address]  [facility] [address]           [facility]oped[facility]  [address]  [facility] [address]  Phone: [phone]          Patient: Viraj Olsen   MR Number: [id number]   YOB: 1950   Date of Visit: 12/6/2019       Dear Dr. Mitch Rojas:    Thank you for referring Viraj Olsen to me for evaluation. Attached you will find relevant portions of my assessment and plan of care.    If you have questions, please do not hesitate to call me. I look forward to following Viraj Olsen along with you.    Sincerely,    Anthony Feldman III, MD    Enclosure  CC:  No Recipients    If you would like to receive this communication electronically, please contact [email or web address] or [phone] to request more information on [facility] [employer] [facility] [employer] [employer] access.    For providers and/or their staff who would like to refer a patient to [facility], please contact us through our one-stop-shop provider referral line, [facility] , at [phone].    If you feel you have received this communication in error or would no longer like to receive these types of communications, please e-mail [email or web address]

## 2019-12-15 DIAGNOSIS — Z79.01 LONG TERM CURRENT USE OF ANTICOAGULANT THERAPY: ICD-10-CM

## 2019-12-15 DIAGNOSIS — M32.9 LUPUS (SYSTEMIC LUPUS ERYTHEMATOSUS): ICD-10-CM

## 2019-12-15 DIAGNOSIS — D68.61 ANTI-PHOSPHOLIPID ANTIBODY SYNDROME: ICD-10-CM

## 2019-12-16 RX ORDER — WARFARIN SODIUM 5 MG/1
TABLET ORAL
Qty: 195 TABLET | Refills: 0 | Status: SHIPPED | OUTPATIENT
Start: 2019-12-16 | End: 2020-04-01

## 2020-01-09 ENCOUNTER — LAB VISIT (OUTPATIENT)
Dept: LAB | Facility: HOSPITAL | Age: 70
End: 2020-01-09
Attending: INTERNAL MEDICINE
Payer: COMMERCIAL

## 2020-01-09 DIAGNOSIS — Z79.01 LONG TERM CURRENT USE OF ANTICOAGULANT THERAPY: ICD-10-CM

## 2020-01-09 DIAGNOSIS — D68.61 ANTI-PHOSPHOLIPID ANTIBODY SYNDROME: ICD-10-CM

## 2020-01-09 LAB
INR PPP: 3.4 (ref 0.8–1.2)
PROTHROMBIN TIME: 31.9 SEC (ref 9–12.5)

## 2020-01-09 PROCEDURE — 85610 PROTHROMBIN TIME: CPT

## 2020-01-09 PROCEDURE — 36415 COLL VENOUS BLD VENIPUNCTURE: CPT | Mod: PO

## 2020-01-10 ENCOUNTER — ANTI-COAG VISIT (OUTPATIENT)
Dept: CARDIOLOGY | Facility: CLINIC | Age: 70
End: 2020-01-10
Payer: COMMERCIAL

## 2020-01-10 DIAGNOSIS — D68.61 ANTI-PHOSPHOLIPID ANTIBODY SYNDROME: ICD-10-CM

## 2020-01-10 DIAGNOSIS — Z79.01 LONG TERM CURRENT USE OF ANTICOAGULANT THERAPY: ICD-10-CM

## 2020-01-10 PROCEDURE — 93793 ANTICOAG MGMT PT WARFARIN: CPT | Mod: S$GLB,,,

## 2020-01-10 PROCEDURE — 93793 PR ANTICOAGULANT MGMT FOR PT TAKING WARFARIN: ICD-10-PCS | Mod: S$GLB,,,

## 2020-02-05 DIAGNOSIS — M32.19 OTHER SYSTEMIC LUPUS ERYTHEMATOSUS WITH OTHER ORGAN INVOLVEMENT: ICD-10-CM

## 2020-02-05 RX ORDER — HYDROXYCHLOROQUINE SULFATE 200 MG/1
TABLET, FILM COATED ORAL
Qty: 180 TABLET | Refills: 1 | Status: SHIPPED | OUTPATIENT
Start: 2020-02-05 | End: 2020-08-10

## 2020-02-13 ENCOUNTER — LAB VISIT (OUTPATIENT)
Dept: LAB | Facility: HOSPITAL | Age: 70
End: 2020-02-13
Attending: INTERNAL MEDICINE
Payer: COMMERCIAL

## 2020-02-13 DIAGNOSIS — D68.61 ANTI-PHOSPHOLIPID ANTIBODY SYNDROME: ICD-10-CM

## 2020-02-13 DIAGNOSIS — Z79.01 LONG TERM CURRENT USE OF ANTICOAGULANT THERAPY: ICD-10-CM

## 2020-02-13 LAB
INR PPP: 3.4 (ref 0.8–1.2)
PROTHROMBIN TIME: 32.2 SEC (ref 9–12.5)

## 2020-02-13 PROCEDURE — 85610 PROTHROMBIN TIME: CPT

## 2020-02-13 PROCEDURE — 36415 COLL VENOUS BLD VENIPUNCTURE: CPT | Mod: PO

## 2020-02-14 ENCOUNTER — ANTI-COAG VISIT (OUTPATIENT)
Dept: CARDIOLOGY | Facility: CLINIC | Age: 70
End: 2020-02-14
Payer: COMMERCIAL

## 2020-02-14 DIAGNOSIS — Z79.01 LONG TERM CURRENT USE OF ANTICOAGULANT THERAPY: ICD-10-CM

## 2020-02-14 DIAGNOSIS — D68.61 ANTI-PHOSPHOLIPID ANTIBODY SYNDROME: ICD-10-CM

## 2020-02-14 PROCEDURE — 93793 ANTICOAG MGMT PT WARFARIN: CPT | Mod: S$GLB,,,

## 2020-02-14 PROCEDURE — 93793 PR ANTICOAGULANT MGMT FOR PT TAKING WARFARIN: ICD-10-PCS | Mod: S$GLB,,,

## 2020-03-03 ENCOUNTER — OFFICE VISIT (OUTPATIENT)
Dept: URGENT CARE | Facility: CLINIC | Age: 70
End: 2020-03-03
Payer: COMMERCIAL

## 2020-03-03 VITALS
TEMPERATURE: 98 F | BODY MASS INDEX: 28.77 KG/M2 | RESPIRATION RATE: 18 BRPM | SYSTOLIC BLOOD PRESSURE: 144 MMHG | OXYGEN SATURATION: 98 % | HEART RATE: 89 BPM | HEIGHT: 70 IN | WEIGHT: 201 LBS | DIASTOLIC BLOOD PRESSURE: 76 MMHG

## 2020-03-03 DIAGNOSIS — R05.9 COUGH IN ADULT: ICD-10-CM

## 2020-03-03 DIAGNOSIS — J06.9 URI, ACUTE: Primary | ICD-10-CM

## 2020-03-03 PROCEDURE — 96372 THER/PROPH/DIAG INJ SC/IM: CPT | Mod: S$GLB,,, | Performed by: FAMILY MEDICINE

## 2020-03-03 PROCEDURE — 96372 PR INJECTION,THERAP/PROPH/DIAG2ST, IM OR SUBCUT: ICD-10-PCS | Mod: S$GLB,,, | Performed by: FAMILY MEDICINE

## 2020-03-03 PROCEDURE — 99213 OFFICE O/P EST LOW 20 MIN: CPT | Mod: 25,S$GLB,, | Performed by: FAMILY MEDICINE

## 2020-03-03 PROCEDURE — 99213 PR OFFICE/OUTPT VISIT, EST, LEVL III, 20-29 MIN: ICD-10-PCS | Mod: 25,S$GLB,, | Performed by: FAMILY MEDICINE

## 2020-03-03 RX ORDER — BETAMETHASONE SODIUM PHOSPHATE AND BETAMETHASONE ACETATE 3; 3 MG/ML; MG/ML
9 INJECTION, SUSPENSION INTRA-ARTICULAR; INTRALESIONAL; INTRAMUSCULAR; SOFT TISSUE
Status: COMPLETED | OUTPATIENT
Start: 2020-03-03 | End: 2020-03-03

## 2020-03-03 RX ADMIN — BETAMETHASONE SODIUM PHOSPHATE AND BETAMETHASONE ACETATE 9 MG: 3; 3 INJECTION, SUSPENSION INTRA-ARTICULAR; INTRALESIONAL; INTRAMUSCULAR; SOFT TISSUE at 05:03

## 2020-03-03 NOTE — PROGRESS NOTES
"Subjective:       Patient ID: Viraj Olsen is a 69 y.o. male.    Vitals:  height is 5' 10" (1.778 m) and weight is 91.2 kg (201 lb). His temperature is 98.1 °F (36.7 °C). His blood pressure is 144/76 (abnormal) and his pulse is 89. His respiration is 18 and oxygen saturation is 98%.     Chief Complaint: URI    Patient complains of cough chest congestion x4 days no fever no chills no history of asthma a no nonsmoker    URI    This is a new problem. The current episode started in the past 7 days (Friday ). The problem has been waxing and waning. There has been no fever. Associated symptoms include congestion, coughing, sinus pain and sneezing. Pertinent negatives include no ear pain, nausea, rash, sore throat, vomiting or wheezing. Treatments tried: Mucinex and Nyquil  The treatment provided mild relief.       Constitution: Negative for chills, sweating, fatigue and fever.   HENT: Positive for congestion, postnasal drip, sinus pain and sinus pressure. Negative for ear pain, sore throat and voice change.    Neck: Negative for painful lymph nodes.   Eyes: Negative for eye redness.   Respiratory: Positive for cough and sputum production. Negative for chest tightness, bloody sputum, COPD, shortness of breath, stridor, wheezing and asthma.    Gastrointestinal: Negative for nausea and vomiting.   Musculoskeletal: Negative for muscle ache.   Skin: Negative for rash.   Allergic/Immunologic: Positive for sneezing. Negative for seasonal allergies and asthma.   Hematologic/Lymphatic: Negative for swollen lymph nodes.       Objective:      Physical Exam   Constitutional: He is oriented to person, place, and time. He appears well-developed and well-nourished. He is cooperative.  Non-toxic appearance. He does not appear ill. No distress.   HENT:   Head: Normocephalic and atraumatic.   Right Ear: Hearing, tympanic membrane, external ear and ear canal normal.   Left Ear: Hearing, tympanic membrane, external ear and ear canal " normal.   Nose: Nose normal. No mucosal edema, rhinorrhea or nasal deformity. No epistaxis. Right sinus exhibits no maxillary sinus tenderness and no frontal sinus tenderness. Left sinus exhibits no maxillary sinus tenderness and no frontal sinus tenderness.   Mouth/Throat: Uvula is midline, oropharynx is clear and moist and mucous membranes are normal. No trismus in the jaw. Normal dentition. No uvula swelling. No posterior oropharyngeal erythema.   Eyes: Conjunctivae and lids are normal. Right eye exhibits no discharge. Left eye exhibits no discharge. No scleral icterus.   Neck: Trachea normal, normal range of motion, full passive range of motion without pain and phonation normal. Neck supple.   Cardiovascular: Normal rate, regular rhythm, normal heart sounds, intact distal pulses and normal pulses.   Pulmonary/Chest: Effort normal and breath sounds normal. No respiratory distress.   Minimally inspiratory wheezes and rhonchi and no crackles   Abdominal: Soft. Normal appearance and bowel sounds are normal. He exhibits no distension, no pulsatile midline mass and no mass. There is no tenderness.   Musculoskeletal: Normal range of motion. He exhibits no edema or deformity.   Neurological: He is alert and oriented to person, place, and time. He exhibits normal muscle tone. Coordination normal.   Skin: Skin is warm, dry, intact, not diaphoretic and not pale.   Psychiatric: He has a normal mood and affect. His speech is normal and behavior is normal. Judgment and thought content normal. Cognition and memory are normal.   Nursing note and vitals reviewed.        Assessment:       1. URI, acute    2. Cough in adult        Plan:         URI, acute    Cough in adult    Other orders  -     betamethasone acetate-betamethasone sodium phosphate injection 9 mg        patient advised to continue Mucinex 1 p.o. b.i.d. and the over-the-counter cough syrup if symptoms persist call return to clinic

## 2020-03-07 ENCOUNTER — OFFICE VISIT (OUTPATIENT)
Dept: URGENT CARE | Facility: CLINIC | Age: 70
End: 2020-03-07
Payer: COMMERCIAL

## 2020-03-07 VITALS
WEIGHT: 201 LBS | TEMPERATURE: 98 F | OXYGEN SATURATION: 97 % | DIASTOLIC BLOOD PRESSURE: 59 MMHG | SYSTOLIC BLOOD PRESSURE: 110 MMHG | BODY MASS INDEX: 28.77 KG/M2 | HEIGHT: 70 IN | HEART RATE: 83 BPM | RESPIRATION RATE: 16 BRPM

## 2020-03-07 DIAGNOSIS — R05.9 COUGH: ICD-10-CM

## 2020-03-07 DIAGNOSIS — B96.89 ACUTE BACTERIAL BRONCHITIS: Primary | ICD-10-CM

## 2020-03-07 DIAGNOSIS — J20.8 ACUTE BACTERIAL BRONCHITIS: Primary | ICD-10-CM

## 2020-03-07 PROCEDURE — 99214 OFFICE O/P EST MOD 30 MIN: CPT | Mod: S$GLB,,, | Performed by: PHYSICIAN ASSISTANT

## 2020-03-07 PROCEDURE — 71046 X-RAY EXAM CHEST 2 VIEWS: CPT | Mod: FY,S$GLB,, | Performed by: RADIOLOGY

## 2020-03-07 PROCEDURE — 71046 XR CHEST PA AND LATERAL: ICD-10-PCS | Mod: FY,S$GLB,, | Performed by: RADIOLOGY

## 2020-03-07 PROCEDURE — 99214 PR OFFICE/OUTPT VISIT, EST, LEVL IV, 30-39 MIN: ICD-10-PCS | Mod: S$GLB,,, | Performed by: PHYSICIAN ASSISTANT

## 2020-03-07 RX ORDER — AMOXICILLIN AND CLAVULANATE POTASSIUM 875; 125 MG/1; MG/1
1 TABLET, FILM COATED ORAL 2 TIMES DAILY
Qty: 14 TABLET | Refills: 0 | Status: SHIPPED | OUTPATIENT
Start: 2020-03-07 | End: 2020-03-14

## 2020-03-07 RX ORDER — ALBUTEROL SULFATE 90 UG/1
2 AEROSOL, METERED RESPIRATORY (INHALATION) EVERY 6 HOURS PRN
Qty: 8 G | Refills: 0 | Status: SHIPPED | OUTPATIENT
Start: 2020-03-07 | End: 2020-03-21

## 2020-03-07 NOTE — PROGRESS NOTES
"Subjective:       Patient ID: Viraj Olsen is a 69 y.o. male.    Vitals:  height is 5' 10" (1.778 m) and weight is 91.2 kg (201 lb). His oral temperature is 98.1 °F (36.7 °C). His blood pressure is 110/59 (abnormal) and his pulse is 83. His respiration is 16 and oxygen saturation is 97%.     Chief Complaint: URI    69-year-old male complaining of sinus congestion and pressure as well as chest congestion for the past 8 days. Denies any fevers, sob, or chest pain. Given steroid shot which resolved sinus congestion, but chest congestion has worsened with yellow sputum.    URI    The current episode started 1 to 4 weeks ago. There has been no fever. Associated symptoms include congestion. Pertinent negatives include no coughing, ear pain, nausea, rash, sinus pain, sore throat, vomiting or wheezing. He has tried nothing for the symptoms.       Constitution: Negative for chills, sweating, fatigue and fever.   HENT: Positive for congestion. Negative for ear pain, sinus pain, sinus pressure, sore throat and voice change.    Neck: Negative for painful lymph nodes.   Eyes: Negative for eye redness.   Respiratory: Negative for chest tightness, cough, sputum production, bloody sputum, COPD, shortness of breath, stridor, wheezing and asthma.    Gastrointestinal: Negative for nausea and vomiting.   Musculoskeletal: Negative for muscle ache.   Skin: Negative for rash.   Allergic/Immunologic: Negative for seasonal allergies and asthma.   Hematologic/Lymphatic: Negative for swollen lymph nodes.       Objective:      Physical Exam   Constitutional: He is oriented to person, place, and time. He appears well-developed and well-nourished. He is cooperative.  Non-toxic appearance. He does not have a sickly appearance. He does not appear ill. No distress.   HENT:   Head: Normocephalic and atraumatic.   Right Ear: Hearing, tympanic membrane, external ear and ear canal normal.   Left Ear: Hearing, tympanic membrane, external ear and ear " canal normal.   Nose: Nose normal. No mucosal edema, rhinorrhea or nasal deformity. No epistaxis. Right sinus exhibits no maxillary sinus tenderness and no frontal sinus tenderness. Left sinus exhibits no maxillary sinus tenderness and no frontal sinus tenderness.   Mouth/Throat: Uvula is midline, oropharynx is clear and moist and mucous membranes are normal. No trismus in the jaw. Normal dentition. No uvula swelling. No oropharyngeal exudate, posterior oropharyngeal edema or posterior oropharyngeal erythema.   Eyes: Conjunctivae and lids are normal. No scleral icterus.   Neck: Trachea normal, full passive range of motion without pain and phonation normal. Neck supple. No neck rigidity. No edema and no erythema present.   Cardiovascular: Normal rate, regular rhythm, normal heart sounds, intact distal pulses and normal pulses.   Pulmonary/Chest: Effort normal. No respiratory distress.   Coarse breath sounds ruf and rmf   Abdominal: Normal appearance.   Musculoskeletal: Normal range of motion. He exhibits no edema or deformity.   Neurological: He is alert and oriented to person, place, and time. He exhibits normal muscle tone. Coordination normal.   Skin: Skin is warm, dry, intact, not diaphoretic and not pale.   Psychiatric: He has a normal mood and affect. His speech is normal and behavior is normal. Judgment and thought content normal. Cognition and memory are normal.   Nursing note and vitals reviewed.  X-ray Chest Pa And Lateral    Result Date: 3/7/2020  EXAMINATION: XR CHEST PA AND LATERAL CLINICAL HISTORY: rhonchi RUF RMF; Cough TECHNIQUE: PA and lateral views of the chest were performed. COMPARISON: 03/19/2008 FINDINGS: The cardiomediastinal silhouette is not enlarged.  There is no pleural effusion.  The trachea is midline.  The lungs are symmetrically expanded bilaterally without evidence of acute parenchymal process. No large focal consolidation seen.  There is no pneumothorax.  The osseous structures are  remarkable for degenerative change..     1. No acute cardiopulmonary process. Electronically signed by: Anton Goodson MD Date:    03/07/2020 Time:    14:41        Assessment:       1. Acute bacterial bronchitis    2. Cough        Plan:       cxr negative    Acute bacterial bronchitis  -     amoxicillin-clavulanate 875-125mg (AUGMENTIN) 875-125 mg per tablet; Take 1 tablet by mouth 2 (two) times daily. for 7 days  Dispense: 14 tablet; Refill: 0  -     albuterol (PROVENTIL/VENTOLIN HFA) 90 mcg/actuation inhaler; Inhale 2 puffs into the lungs every 6 (six) hours as needed for Wheezing or Shortness of Breath. Rescue  Dispense: 8 g; Refill: 0    Cough  -     X-Ray Chest PA And Lateral; Future; Expected date: 03/07/2020         Patient Instructions     Antibiotics  Fluids  Inhaler  Rest  mucinex    Return for worsening or fevers  Follow up with primary caer in 1 week    Please drink plenty of fluids.  Please get plenty of rest.  Please return here or go to the Emergency Department for any concerns or worsening of condition.  If you were given wait & see antibiotics, please wait 3-5 days before taking them, and only take them if your symptoms have worsened or not improved.  If you do begin taking the antibiotics, please take them to completion.  If you were prescribed antibiotics, please take them to completion.  If you were prescribed a narcotic medication, do not drive or operate heavy equipment or machinery while taking these medications.  If you do not have Hypertension or any history of palpitations, it is ok to take over the counter Sudafed or Mucinex D or Allegra-D or Claritin-D or Zyrtec-D.  If you do take one of the above, it is ok to combine that with plain over the counter Mucinex or Allegra or Claritin or Zyrtec.  If for example you are taking Zyrtec -D, you can combine that with Mucinex, but not Mucinex-D.  If you are taking Mucinex-D, you can combine that with plain Allegra or Claritin or Zyrtec.   If you  do have Hypertension or palpitations, it is safe to take Coricidin HBP for relief of sinus symptoms.  We recommend you take over the counter Flonase (Fluticasone) or another nasally inhaled steroid unless you are already taking one.  Nasal irrigation with a saline spray or Netti Pot like device per their directions is also recommended.  If not allergic, please take over the counter Tylenol (Acetaminophen) and/or Motrin (Ibuprofen) as directed for control of pain and/or fever.  Please follow up with your primary care doctor or specialist as needed.    If you  smoke, please stop smoking.      Acute Bronchitis  Your healthcare provider has told you that you have acute bronchitis. Bronchitis is infection or inflammation of the bronchial tubes (airways in the lungs). Normally, air moves easily in and out of the airways. Bronchitis narrows the airways, making it harder for air to flow in and out of the lungs. This causes symptoms such as shortness of breath, coughing up yellow or green mucus, and wheezing. Bronchitis can be acute or chronic. Acute means the condition comes on quickly and goes away in a short time, usually within 3 to 10 days. Chronic means a condition lasts a long time and often comes back.    What causes acute bronchitis?  Acute bronchitis almost always starts as a viral respiratory infection, such as a cold or the flu. Certain factors make it more likely for a cold or flu to turn into bronchitis. These include being very young, being elderly, having a heart or lung problem, or having a weak immune system. Cigarette smoking also makes bronchitis more likely.  When bronchitis develops, the airways become swollen. The airways may also become infected with bacteria. This is known as a secondary infection.  Diagnosing acute bronchitis  Your healthcare provider will examine you and ask about your symptoms and health history. You may also have a sputum culture to test the fluid in your lungs. Chest X-rays may  be done to look for infection in the lungs.  Treating acute bronchitis  Bronchitis usually clears up as the cold or flu goes away. You can help feel better faster by doing the following:  · Take medicine as directed. You may be told to take ibuprofen or other over-the-counter medicines. These help relieve inflammation in your bronchial tubes. Your healthcare provider may prescribe an inhaler to help open up the bronchial tubes. Most of the time, acute bronchitis is caused by a viral infection. Antibiotics are usually not prescribed for viral infections.  · Drink plenty of fluids, such as water, juice, or warm soup. Fluids loosen mucus so that you can cough it up. This helps you breathe more easily. Fluids also prevent dehydration.  · Make sure you get plenty of rest.  · Do not smoke. Do not allow anyone else to smoke in your home.  Recovery and follow-up  Follow up with your doctor as you are told. You will likely feel better in a week or two. But a dry cough can linger beyond that time. Let your doctor know if you still have symptoms (other than a dry cough) after 2 weeks, or if youre prone to getting bronchial infections. Take steps to protect yourself from future infections. These steps include stopping smoking and avoiding tobacco smoke, washing your hands often, and getting a yearly flu shot.  When to call your healthcare provider  Call the healthcare provider if you have any of the following:  · Fever of 100.4°F (38.0°C) or higher, or as advised  · Symptoms that get worse, or new symptoms  · Trouble breathing  · Symptoms that dont start to improve within a week, or within 3 days of taking antibiotics   Date Last Reviewed: 12/1/2016  © 2231-6651 [employer]. [address] [place] [address], [place] [address]. All rights reserved. This information is not intended as a substitute for professional medical care. Always follow your healthcare professional's instructions.

## 2020-03-14 DIAGNOSIS — Z79.01 LONG TERM CURRENT USE OF ANTICOAGULANT THERAPY: ICD-10-CM

## 2020-03-14 DIAGNOSIS — D68.61 ANTI-PHOSPHOLIPID ANTIBODY SYNDROME: ICD-10-CM

## 2020-03-14 DIAGNOSIS — M32.9 LUPUS (SYSTEMIC LUPUS ERYTHEMATOSUS): ICD-10-CM

## 2020-03-14 RX ORDER — WARFARIN SODIUM 5 MG/1
TABLET ORAL
Qty: 195 TABLET | Refills: 0 | Status: SHIPPED | OUTPATIENT
Start: 2020-03-14 | End: 2020-05-31

## 2020-03-19 ENCOUNTER — ANTI-COAG VISIT (OUTPATIENT)
Dept: CARDIOLOGY | Facility: CLINIC | Age: 70
End: 2020-03-19
Payer: COMMERCIAL

## 2020-03-19 ENCOUNTER — LAB VISIT (OUTPATIENT)
Dept: LAB | Facility: HOSPITAL | Age: 70
End: 2020-03-19
Attending: INTERNAL MEDICINE
Payer: COMMERCIAL

## 2020-03-19 DIAGNOSIS — Z79.01 LONG TERM CURRENT USE OF ANTICOAGULANT THERAPY: ICD-10-CM

## 2020-03-19 DIAGNOSIS — D68.61 ANTI-PHOSPHOLIPID ANTIBODY SYNDROME: ICD-10-CM

## 2020-03-19 LAB
INR PPP: 4 (ref 0.8–1.2)
PROTHROMBIN TIME: 37.9 SEC (ref 9–12.5)

## 2020-03-19 PROCEDURE — 93793 ANTICOAG MGMT PT WARFARIN: CPT | Mod: S$GLB,,,

## 2020-03-19 PROCEDURE — 93793 PR ANTICOAGULANT MGMT FOR PT TAKING WARFARIN: ICD-10-PCS | Mod: S$GLB,,,

## 2020-03-19 PROCEDURE — 36415 COLL VENOUS BLD VENIPUNCTURE: CPT | Mod: PO

## 2020-03-19 PROCEDURE — 85610 PROTHROMBIN TIME: CPT

## 2020-03-26 ENCOUNTER — PATIENT MESSAGE (OUTPATIENT)
Dept: RHEUMATOLOGY | Facility: CLINIC | Age: 70
End: 2020-03-26

## 2020-03-26 ENCOUNTER — LAB VISIT (OUTPATIENT)
Dept: LAB | Facility: HOSPITAL | Age: 70
End: 2020-03-26
Attending: INTERNAL MEDICINE
Payer: COMMERCIAL

## 2020-03-26 DIAGNOSIS — M32.19 OTHER SYSTEMIC LUPUS ERYTHEMATOSUS WITH OTHER ORGAN INVOLVEMENT: Chronic | ICD-10-CM

## 2020-03-26 LAB
BILIRUB UR QL STRIP: NEGATIVE
CLARITY UR REFRACT.AUTO: CLEAR
COLOR UR AUTO: YELLOW
CREAT UR-MCNC: 105 MG/DL (ref 23–375)
GLUCOSE UR QL STRIP: NEGATIVE
HGB UR QL STRIP: NEGATIVE
KETONES UR QL STRIP: NEGATIVE
LEUKOCYTE ESTERASE UR QL STRIP: NEGATIVE
MICROSCOPIC COMMENT: NORMAL
NITRITE UR QL STRIP: NEGATIVE
PH UR STRIP: 6 [PH] (ref 5–8)
PROT UR QL STRIP: NEGATIVE
PROT UR-MCNC: <7 MG/DL (ref 0–15)
PROT/CREAT UR: NORMAL MG/G{CREAT} (ref 0–0.2)
RBC #/AREA URNS AUTO: 2 /HPF (ref 0–4)
SP GR UR STRIP: 1.02 (ref 1–1.03)
URN SPEC COLLECT METH UR: NORMAL
WBC #/AREA URNS AUTO: 0 /HPF (ref 0–5)

## 2020-03-26 PROCEDURE — 82570 ASSAY OF URINE CREATININE: CPT

## 2020-03-26 PROCEDURE — 81001 URINALYSIS AUTO W/SCOPE: CPT

## 2020-03-27 ENCOUNTER — LAB VISIT (OUTPATIENT)
Dept: LAB | Facility: HOSPITAL | Age: 70
End: 2020-03-27
Attending: INTERNAL MEDICINE
Payer: COMMERCIAL

## 2020-03-27 DIAGNOSIS — D68.61 ANTI-PHOSPHOLIPID ANTIBODY SYNDROME: Chronic | ICD-10-CM

## 2020-03-27 DIAGNOSIS — M32.9 LUPUS (SYSTEMIC LUPUS ERYTHEMATOSUS): Chronic | ICD-10-CM

## 2020-03-27 DIAGNOSIS — Z79.899 HIGH RISK MEDICATION USE: ICD-10-CM

## 2020-03-27 LAB
25(OH)D3+25(OH)D2 SERPL-MCNC: 86 NG/ML (ref 30–96)
ALBUMIN SERPL BCP-MCNC: 4.1 G/DL (ref 3.5–5.2)
ALP SERPL-CCNC: 72 U/L (ref 55–135)
ALT SERPL W/O P-5'-P-CCNC: 22 U/L (ref 10–44)
ANION GAP SERPL CALC-SCNC: 8 MMOL/L (ref 8–16)
AST SERPL-CCNC: 26 U/L (ref 10–40)
BASOPHILS # BLD AUTO: 0.02 K/UL (ref 0–0.2)
BASOPHILS NFR BLD: 0.4 % (ref 0–1.9)
BILIRUB SERPL-MCNC: 0.4 MG/DL (ref 0.1–1)
BUN SERPL-MCNC: 15 MG/DL (ref 8–23)
C3 SERPL-MCNC: 81 MG/DL (ref 50–180)
C4 SERPL-MCNC: 12 MG/DL (ref 11–44)
CALCIUM SERPL-MCNC: 9.8 MG/DL (ref 8.7–10.5)
CHLORIDE SERPL-SCNC: 104 MMOL/L (ref 95–110)
CO2 SERPL-SCNC: 27 MMOL/L (ref 23–29)
CREAT SERPL-MCNC: 1 MG/DL (ref 0.5–1.4)
CRP SERPL-MCNC: 1.8 MG/L (ref 0–8.2)
DAT IGG-SP REAG RBC-IMP: NORMAL
DIFFERENTIAL METHOD: ABNORMAL
EOSINOPHIL # BLD AUTO: 0.1 K/UL (ref 0–0.5)
EOSINOPHIL NFR BLD: 3.1 % (ref 0–8)
ERYTHROCYTE [DISTWIDTH] IN BLOOD BY AUTOMATED COUNT: 12.6 % (ref 11.5–14.5)
ERYTHROCYTE [SEDIMENTATION RATE] IN BLOOD BY WESTERGREN METHOD: 29 MM/HR (ref 0–23)
EST. GFR  (AFRICAN AMERICAN): >60 ML/MIN/1.73 M^2
EST. GFR  (NON AFRICAN AMERICAN): >60 ML/MIN/1.73 M^2
GLUCOSE SERPL-MCNC: 85 MG/DL (ref 70–110)
HCT VFR BLD AUTO: 38.5 % (ref 40–54)
HGB BLD-MCNC: 12.5 G/DL (ref 14–18)
IMM GRANULOCYTES # BLD AUTO: 0.02 K/UL (ref 0–0.04)
IMM GRANULOCYTES NFR BLD AUTO: 0.4 % (ref 0–0.5)
LYMPHOCYTES # BLD AUTO: 1.4 K/UL (ref 1–4.8)
LYMPHOCYTES NFR BLD: 31 % (ref 18–48)
MCH RBC QN AUTO: 30.8 PG (ref 27–31)
MCHC RBC AUTO-ENTMCNC: 32.5 G/DL (ref 32–36)
MCV RBC AUTO: 95 FL (ref 82–98)
MONOCYTES # BLD AUTO: 0.4 K/UL (ref 0.3–1)
MONOCYTES NFR BLD: 9.5 % (ref 4–15)
NEUTROPHILS # BLD AUTO: 2.5 K/UL (ref 1.8–7.7)
NEUTROPHILS NFR BLD: 55.6 % (ref 38–73)
NRBC BLD-RTO: 0 /100 WBC
PLATELET # BLD AUTO: 253 K/UL (ref 150–350)
PMV BLD AUTO: 9.9 FL (ref 9.2–12.9)
POTASSIUM SERPL-SCNC: 4.2 MMOL/L (ref 3.5–5.1)
PROT SERPL-MCNC: 6.9 G/DL (ref 6–8.4)
RBC # BLD AUTO: 4.06 M/UL (ref 4.6–6.2)
SODIUM SERPL-SCNC: 139 MMOL/L (ref 136–145)
WBC # BLD AUTO: 4.52 K/UL (ref 3.9–12.7)

## 2020-03-27 PROCEDURE — 86160 COMPLEMENT ANTIGEN: CPT | Mod: 59

## 2020-03-27 PROCEDURE — 86160 COMPLEMENT ANTIGEN: CPT

## 2020-03-27 PROCEDURE — 86880 COOMBS TEST DIRECT: CPT

## 2020-03-27 PROCEDURE — 86140 C-REACTIVE PROTEIN: CPT

## 2020-03-27 PROCEDURE — 85652 RBC SED RATE AUTOMATED: CPT

## 2020-03-27 PROCEDURE — 86225 DNA ANTIBODY NATIVE: CPT

## 2020-03-27 PROCEDURE — 85025 COMPLETE CBC W/AUTO DIFF WBC: CPT

## 2020-03-27 PROCEDURE — 36415 COLL VENOUS BLD VENIPUNCTURE: CPT | Mod: PO

## 2020-03-27 PROCEDURE — 80053 COMPREHEN METABOLIC PANEL: CPT

## 2020-03-27 PROCEDURE — 82306 VITAMIN D 25 HYDROXY: CPT

## 2020-03-30 LAB — DSDNA AB SER-ACNC: ABNORMAL [IU]/ML

## 2020-04-01 ENCOUNTER — OFFICE VISIT (OUTPATIENT)
Dept: RHEUMATOLOGY | Facility: CLINIC | Age: 70
End: 2020-04-01
Attending: INTERNAL MEDICINE
Payer: COMMERCIAL

## 2020-04-01 DIAGNOSIS — M32.19 OTHER SYSTEMIC LUPUS ERYTHEMATOSUS WITH OTHER ORGAN INVOLVEMENT: Primary | ICD-10-CM

## 2020-04-01 DIAGNOSIS — M17.30 POST-TRAUMATIC OSTEOARTHRITIS OF KNEE, UNSPECIFIED LATERALITY: ICD-10-CM

## 2020-04-01 DIAGNOSIS — D68.61 ANTI-PHOSPHOLIPID ANTIBODY SYNDROME: Chronic | ICD-10-CM

## 2020-04-01 DIAGNOSIS — Z79.899 HIGH RISK MEDICATION USE: ICD-10-CM

## 2020-04-01 PROCEDURE — 99214 OFFICE O/P EST MOD 30 MIN: CPT | Mod: 95,,, | Performed by: INTERNAL MEDICINE

## 2020-04-01 PROCEDURE — 99214 PR OFFICE/OUTPT VISIT, EST, LEVL IV, 30-39 MIN: ICD-10-PCS | Mod: 95,,, | Performed by: INTERNAL MEDICINE

## 2020-04-01 PROCEDURE — 1101F PT FALLS ASSESS-DOCD LE1/YR: CPT | Mod: CPTII,S$GLB,, | Performed by: INTERNAL MEDICINE

## 2020-04-01 PROCEDURE — 1159F MED LIST DOCD IN RCRD: CPT | Mod: S$GLB,,, | Performed by: INTERNAL MEDICINE

## 2020-04-01 PROCEDURE — 1101F PR PT FALLS ASSESS DOC 0-1 FALLS W/OUT INJ PAST YR: ICD-10-PCS | Mod: CPTII,S$GLB,, | Performed by: INTERNAL MEDICINE

## 2020-04-01 PROCEDURE — 1159F PR MEDICATION LIST DOCUMENTED IN MEDICAL RECORD: ICD-10-PCS | Mod: S$GLB,,, | Performed by: INTERNAL MEDICINE

## 2020-04-01 RX ORDER — CELECOXIB 200 MG/1
200 CAPSULE ORAL DAILY PRN
Qty: 20 CAPSULE | Refills: 1 | Status: SHIPPED | OUTPATIENT
Start: 2020-04-01 | End: 2020-06-10

## 2020-04-01 NOTE — ASSESSMENT & PLAN NOTE
He will use tylenol prn and very rarely take a celebrex (is on warfarin so only when sx are severe)

## 2020-04-01 NOTE — ASSESSMENT & PLAN NOTE
He has had no recurrent sx over the last year and labs look good though DNA may be sl higher (1:20 vs 1:10); sed rate is more likely a residual effect of recent resp infection that has now cleared

## 2020-04-01 NOTE — PROGRESS NOTES
Subjective:       Patient ID: Viraj Olsen is a 69 y.o. male.    Chief Complaint: Disease Management    F/u SLE , APLA    Systemic lupus erythematosus since 1995, with neuro-psychiatric lesions in 2006 with recurrence in 2007, treated to remission with hydroxychloroquine, azathioprine, and prednisone.  HCQ since 1995    Hx antiphospholipid with TIA's, on chronic coumadin since 2006     /d     Hx colon polyp 2010          Had a case of bronchitis couple weeks ago; some cough; took amoxicillin for a week    Coumadin     Some R knee pain bogdan in garden  Walking ok  Gel after sits  Went to ortho and they said not surgical      Review of Systems   Constitutional: Negative for fever and unexpected weight change.   HENT: Negative for trouble swallowing.    Eyes: Negative for redness.   Respiratory: Negative for cough and shortness of breath.    Cardiovascular: Negative for chest pain.   Gastrointestinal: Negative for constipation and diarrhea.   Genitourinary: Negative for genital sores.   Skin: Negative for rash.   Neurological: Negative for headaches.   Hematological: Does not bruise/bleed easily.         Objective:   There were no vitals taken for this visit.     Physical Exam    VS taken by his RN wife:  107/78, 75, 16, 97    Lab Results   Component Value Date    SEDRATE 29 (H) 03/27/2020      Lab Results   Component Value Date    WBC 4.52 03/27/2020    HGB 12.5 (L) 03/27/2020    HCT 38.5 (L) 03/27/2020    MCV 95 03/27/2020     03/27/2020        Assessment:       1. Lupus (systemic lupus erythematosus)    2. High risk medication use    3. Anti-phospholipid antibody syndrome    4. Post-traumatic osteoarthritis of knee, unspecified laterality            Plan:       Problem List Items Addressed This Visit        Active Problems    Other forms of systemic lupus erythematosus - Primary     He has had no recurrent sx over the last year and labs look good though DNA may be sl higher (1:20 vs 1:10); sed rate is  more likely a residual effect of recent resp infection that has now cleared         Anti-phospholipid antibody syndrome (Chronic)     He has continued warfarin with no complications and will stay on it indefinitely         Post-traumatic osteoarthritis of knee     He will use tylenol prn and very rarely take a celebrex (is on warfarin so only when sx are severe)         Relevant Medications    celecoxib (CELEBREX) 200 MG capsule    High risk medication use     He is on hydroxychloroquine and had annual eye check recently without complications

## 2020-04-07 ENCOUNTER — PATIENT MESSAGE (OUTPATIENT)
Dept: RHEUMATOLOGY | Facility: CLINIC | Age: 70
End: 2020-04-07

## 2020-04-16 ENCOUNTER — ANTI-COAG VISIT (OUTPATIENT)
Dept: CARDIOLOGY | Facility: CLINIC | Age: 70
End: 2020-04-16
Payer: COMMERCIAL

## 2020-04-16 ENCOUNTER — ANTI-COAG VISIT (OUTPATIENT)
Dept: CARDIOLOGY | Facility: CLINIC | Age: 70
End: 2020-04-16

## 2020-04-16 ENCOUNTER — LAB VISIT (OUTPATIENT)
Dept: LAB | Facility: HOSPITAL | Age: 70
End: 2020-04-16
Attending: INTERNAL MEDICINE
Payer: COMMERCIAL

## 2020-04-16 DIAGNOSIS — Z79.01 LONG TERM CURRENT USE OF ANTICOAGULANT THERAPY: ICD-10-CM

## 2020-04-16 DIAGNOSIS — D68.61 ANTI-PHOSPHOLIPID ANTIBODY SYNDROME: ICD-10-CM

## 2020-04-16 LAB
INR PPP: 3.3 (ref 0.8–1.2)
PROTHROMBIN TIME: 31.5 SEC (ref 9–12.5)

## 2020-04-16 PROCEDURE — 93793 PR ANTICOAGULANT MGMT FOR PT TAKING WARFARIN: ICD-10-PCS | Mod: S$GLB,,,

## 2020-04-16 PROCEDURE — 85610 PROTHROMBIN TIME: CPT

## 2020-04-16 PROCEDURE — 36415 COLL VENOUS BLD VENIPUNCTURE: CPT | Mod: PO

## 2020-04-16 PROCEDURE — 93793 ANTICOAG MGMT PT WARFARIN: CPT | Mod: S$GLB,,,

## 2020-05-22 ENCOUNTER — PATIENT MESSAGE (OUTPATIENT)
Dept: RHEUMATOLOGY | Facility: CLINIC | Age: 70
End: 2020-05-22

## 2020-05-28 ENCOUNTER — LAB VISIT (OUTPATIENT)
Dept: LAB | Facility: HOSPITAL | Age: 70
End: 2020-05-28
Attending: INTERNAL MEDICINE
Payer: COMMERCIAL

## 2020-05-28 DIAGNOSIS — Z79.01 LONG TERM CURRENT USE OF ANTICOAGULANT THERAPY: ICD-10-CM

## 2020-05-28 DIAGNOSIS — D68.61 ANTI-PHOSPHOLIPID ANTIBODY SYNDROME: ICD-10-CM

## 2020-05-28 LAB
INR PPP: 4.9 (ref 0.8–1.2)
PROTHROMBIN TIME: 47.1 SEC (ref 9–12.5)

## 2020-05-28 PROCEDURE — 36415 COLL VENOUS BLD VENIPUNCTURE: CPT | Mod: PO

## 2020-05-28 PROCEDURE — 85610 PROTHROMBIN TIME: CPT

## 2020-05-29 ENCOUNTER — ANTI-COAG VISIT (OUTPATIENT)
Dept: CARDIOLOGY | Facility: CLINIC | Age: 70
End: 2020-05-29
Payer: COMMERCIAL

## 2020-05-29 DIAGNOSIS — Z79.01 LONG TERM CURRENT USE OF ANTICOAGULANT THERAPY: ICD-10-CM

## 2020-05-29 DIAGNOSIS — D68.61 ANTI-PHOSPHOLIPID ANTIBODY SYNDROME: ICD-10-CM

## 2020-05-29 PROCEDURE — 93793 PR ANTICOAGULANT MGMT FOR PT TAKING WARFARIN: ICD-10-PCS | Mod: S$GLB,,,

## 2020-05-29 PROCEDURE — 93793 ANTICOAG MGMT PT WARFARIN: CPT | Mod: S$GLB,,,

## 2020-05-29 NOTE — PROGRESS NOTES
INR not at goal. Medications, chart, and patient findings reviewed. See calendar for adjustments to dose and follow up plan.  Will reduce current regimen & re-assess in 2 weeks.  Pt advised & indicated understanding.

## 2020-05-29 NOTE — PROGRESS NOTES
Pt states he have been taking Celebrex every other day. Pt states he also take a Zinc tablet. Pt denies any other changes. Pt confirm doses.

## 2020-05-31 DIAGNOSIS — D68.61 ANTI-PHOSPHOLIPID ANTIBODY SYNDROME: ICD-10-CM

## 2020-05-31 DIAGNOSIS — M32.9 LUPUS (SYSTEMIC LUPUS ERYTHEMATOSUS): ICD-10-CM

## 2020-05-31 DIAGNOSIS — Z79.01 LONG TERM CURRENT USE OF ANTICOAGULANT THERAPY: ICD-10-CM

## 2020-05-31 RX ORDER — WARFARIN SODIUM 5 MG/1
TABLET ORAL
Qty: 195 TABLET | Refills: 0 | Status: SHIPPED | OUTPATIENT
Start: 2020-05-31 | End: 2020-08-28 | Stop reason: SDUPTHER

## 2020-06-11 ENCOUNTER — LAB VISIT (OUTPATIENT)
Dept: LAB | Facility: HOSPITAL | Age: 70
End: 2020-06-11
Attending: INTERNAL MEDICINE
Payer: COMMERCIAL

## 2020-06-11 DIAGNOSIS — D68.61 ANTI-PHOSPHOLIPID ANTIBODY SYNDROME: ICD-10-CM

## 2020-06-11 DIAGNOSIS — Z79.01 LONG TERM CURRENT USE OF ANTICOAGULANT THERAPY: ICD-10-CM

## 2020-06-11 LAB
INR PPP: 2.8 (ref 0.8–1.2)
PROTHROMBIN TIME: 26.4 SEC (ref 9–12.5)

## 2020-06-11 PROCEDURE — 85610 PROTHROMBIN TIME: CPT

## 2020-06-11 PROCEDURE — 36415 COLL VENOUS BLD VENIPUNCTURE: CPT | Mod: PO

## 2020-06-12 ENCOUNTER — ANTI-COAG VISIT (OUTPATIENT)
Dept: CARDIOLOGY | Facility: CLINIC | Age: 70
End: 2020-06-12
Payer: COMMERCIAL

## 2020-06-12 ENCOUNTER — PATIENT MESSAGE (OUTPATIENT)
Dept: RHEUMATOLOGY | Facility: CLINIC | Age: 70
End: 2020-06-12

## 2020-06-12 DIAGNOSIS — Z79.01 LONG TERM CURRENT USE OF ANTICOAGULANT THERAPY: ICD-10-CM

## 2020-06-12 DIAGNOSIS — D68.61 ANTI-PHOSPHOLIPID ANTIBODY SYNDROME: ICD-10-CM

## 2020-06-12 PROCEDURE — 93793 PR ANTICOAGULANT MGMT FOR PT TAKING WARFARIN: ICD-10-PCS | Mod: S$GLB,,,

## 2020-06-12 PROCEDURE — 93793 ANTICOAG MGMT PT WARFARIN: CPT | Mod: S$GLB,,,

## 2020-07-02 ENCOUNTER — LAB VISIT (OUTPATIENT)
Dept: LAB | Facility: HOSPITAL | Age: 70
End: 2020-07-02
Attending: INTERNAL MEDICINE
Payer: COMMERCIAL

## 2020-07-02 DIAGNOSIS — Z79.01 LONG TERM CURRENT USE OF ANTICOAGULANT THERAPY: ICD-10-CM

## 2020-07-02 DIAGNOSIS — D68.61 ANTI-PHOSPHOLIPID ANTIBODY SYNDROME: ICD-10-CM

## 2020-07-02 LAB
INR PPP: 2.9 (ref 0.8–1.2)
PROTHROMBIN TIME: 27 SEC (ref 9–12.5)

## 2020-07-02 PROCEDURE — 36415 COLL VENOUS BLD VENIPUNCTURE: CPT | Mod: PO

## 2020-07-02 PROCEDURE — 85610 PROTHROMBIN TIME: CPT

## 2020-07-06 ENCOUNTER — ANTI-COAG VISIT (OUTPATIENT)
Dept: CARDIOLOGY | Facility: CLINIC | Age: 70
End: 2020-07-06
Payer: COMMERCIAL

## 2020-07-06 DIAGNOSIS — Z79.01 LONG TERM CURRENT USE OF ANTICOAGULANT THERAPY: ICD-10-CM

## 2020-07-06 DIAGNOSIS — D68.61 ANTI-PHOSPHOLIPID ANTIBODY SYNDROME: ICD-10-CM

## 2020-07-06 PROCEDURE — 93793 PR ANTICOAGULANT MGMT FOR PT TAKING WARFARIN: ICD-10-PCS | Mod: S$GLB,,,

## 2020-07-06 PROCEDURE — 93793 ANTICOAG MGMT PT WARFARIN: CPT | Mod: S$GLB,,,

## 2020-07-30 ENCOUNTER — LAB VISIT (OUTPATIENT)
Dept: LAB | Facility: HOSPITAL | Age: 70
End: 2020-07-30
Attending: INTERNAL MEDICINE
Payer: COMMERCIAL

## 2020-07-30 DIAGNOSIS — Z79.01 LONG TERM CURRENT USE OF ANTICOAGULANT THERAPY: ICD-10-CM

## 2020-07-30 DIAGNOSIS — D68.61 ANTI-PHOSPHOLIPID ANTIBODY SYNDROME: ICD-10-CM

## 2020-07-30 LAB
INR PPP: 2.1 (ref 0.8–1.2)
PROTHROMBIN TIME: 22 SEC (ref 9–12.5)

## 2020-07-30 PROCEDURE — 85610 PROTHROMBIN TIME: CPT

## 2020-07-30 PROCEDURE — 36415 COLL VENOUS BLD VENIPUNCTURE: CPT | Mod: PO

## 2020-07-31 ENCOUNTER — ANTI-COAG VISIT (OUTPATIENT)
Dept: CARDIOLOGY | Facility: CLINIC | Age: 70
End: 2020-07-31
Payer: COMMERCIAL

## 2020-07-31 DIAGNOSIS — D68.61 ANTI-PHOSPHOLIPID ANTIBODY SYNDROME: ICD-10-CM

## 2020-07-31 DIAGNOSIS — Z79.01 LONG TERM CURRENT USE OF ANTICOAGULANT THERAPY: ICD-10-CM

## 2020-07-31 PROCEDURE — 93793 PR ANTICOAGULANT MGMT FOR PT TAKING WARFARIN: ICD-10-PCS | Mod: S$GLB,,,

## 2020-07-31 PROCEDURE — 93793 ANTICOAG MGMT PT WARFARIN: CPT | Mod: S$GLB,,,

## 2020-07-31 NOTE — PROGRESS NOTES
INR not at goal. Medications, chart, and patient findings reviewed. See calendar for adjustments to dose and follow up plan.  DDI w/ celebrex (can increase risk of bleeding).  Recommend that pt takes 12.5mg 7/31 & resume maintenance dose.  Plan to re-assess in 2 weeks.

## 2020-08-13 ENCOUNTER — ANTI-COAG VISIT (OUTPATIENT)
Dept: CARDIOLOGY | Facility: CLINIC | Age: 70
End: 2020-08-13
Payer: COMMERCIAL

## 2020-08-13 ENCOUNTER — LAB VISIT (OUTPATIENT)
Dept: LAB | Facility: HOSPITAL | Age: 70
End: 2020-08-13
Attending: INTERNAL MEDICINE
Payer: COMMERCIAL

## 2020-08-13 DIAGNOSIS — Z79.01 LONG TERM CURRENT USE OF ANTICOAGULANT THERAPY: ICD-10-CM

## 2020-08-13 DIAGNOSIS — D68.61 ANTI-PHOSPHOLIPID ANTIBODY SYNDROME: ICD-10-CM

## 2020-08-13 LAB
INR PPP: 2.6 (ref 0.8–1.2)
PROTHROMBIN TIME: 27.4 SEC (ref 9–12.5)

## 2020-08-13 PROCEDURE — 93793 PR ANTICOAGULANT MGMT FOR PT TAKING WARFARIN: ICD-10-PCS | Mod: S$GLB,,,

## 2020-08-13 PROCEDURE — 36415 COLL VENOUS BLD VENIPUNCTURE: CPT | Mod: PO

## 2020-08-13 PROCEDURE — 93793 ANTICOAG MGMT PT WARFARIN: CPT | Mod: S$GLB,,,

## 2020-08-13 PROCEDURE — 85610 PROTHROMBIN TIME: CPT

## 2020-09-03 ENCOUNTER — LAB VISIT (OUTPATIENT)
Dept: LAB | Facility: HOSPITAL | Age: 70
End: 2020-09-03
Attending: INTERNAL MEDICINE
Payer: COMMERCIAL

## 2020-09-03 DIAGNOSIS — D68.61 ANTI-PHOSPHOLIPID ANTIBODY SYNDROME: ICD-10-CM

## 2020-09-03 DIAGNOSIS — Z79.01 LONG TERM CURRENT USE OF ANTICOAGULANT THERAPY: ICD-10-CM

## 2020-09-03 LAB
INR PPP: 2 (ref 0.8–1.2)
PROTHROMBIN TIME: 21.9 SEC (ref 9–12.5)

## 2020-09-03 PROCEDURE — 85610 PROTHROMBIN TIME: CPT

## 2020-09-03 PROCEDURE — 36415 COLL VENOUS BLD VENIPUNCTURE: CPT | Mod: PO

## 2020-09-04 ENCOUNTER — ANTI-COAG VISIT (OUTPATIENT)
Dept: CARDIOLOGY | Facility: CLINIC | Age: 70
End: 2020-09-04
Payer: COMMERCIAL

## 2020-09-04 DIAGNOSIS — Z79.01 LONG TERM CURRENT USE OF ANTICOAGULANT THERAPY: ICD-10-CM

## 2020-09-04 DIAGNOSIS — D68.61 ANTI-PHOSPHOLIPID ANTIBODY SYNDROME: ICD-10-CM

## 2020-09-04 PROCEDURE — 93793 PR ANTICOAGULANT MGMT FOR PT TAKING WARFARIN: ICD-10-PCS | Mod: S$GLB,,,

## 2020-09-04 PROCEDURE — 93793 ANTICOAG MGMT PT WARFARIN: CPT | Mod: S$GLB,,,

## 2020-09-04 NOTE — PROGRESS NOTES
INR not at goal. Medications, chart, and patient findings reviewed. Reports eating more salads lately. Plan to boost with 12.5mg 9/4 & increase dose since he has trended low/lower end of goal the past few INRs. Recheck INR in 2 weeks. See calendar for adjustments to dose and follow up plan.

## 2020-09-18 ENCOUNTER — LAB VISIT (OUTPATIENT)
Dept: LAB | Facility: HOSPITAL | Age: 70
End: 2020-09-18
Attending: INTERNAL MEDICINE
Payer: COMMERCIAL

## 2020-09-18 DIAGNOSIS — Z79.01 LONG TERM CURRENT USE OF ANTICOAGULANT THERAPY: ICD-10-CM

## 2020-09-18 DIAGNOSIS — D68.61 ANTI-PHOSPHOLIPID ANTIBODY SYNDROME: ICD-10-CM

## 2020-09-18 LAB
INR PPP: 3.1 (ref 0.8–1.2)
PROTHROMBIN TIME: 32.8 SEC (ref 9–12.5)

## 2020-09-18 PROCEDURE — 36415 COLL VENOUS BLD VENIPUNCTURE: CPT | Mod: PO

## 2020-09-18 PROCEDURE — 85610 PROTHROMBIN TIME: CPT

## 2020-09-21 ENCOUNTER — ANTI-COAG VISIT (OUTPATIENT)
Dept: CARDIOLOGY | Facility: CLINIC | Age: 70
End: 2020-09-21
Payer: COMMERCIAL

## 2020-09-21 DIAGNOSIS — Z79.01 LONG TERM CURRENT USE OF ANTICOAGULANT THERAPY: ICD-10-CM

## 2020-09-21 DIAGNOSIS — D68.61 ANTI-PHOSPHOLIPID ANTIBODY SYNDROME: ICD-10-CM

## 2020-09-21 PROCEDURE — 93793 ANTICOAG MGMT PT WARFARIN: CPT | Mod: S$GLB,,,

## 2020-09-21 PROCEDURE — 93793 PR ANTICOAGULANT MGMT FOR PT TAKING WARFARIN: ICD-10-PCS | Mod: S$GLB,,,

## 2020-10-15 ENCOUNTER — ANTI-COAG VISIT (OUTPATIENT)
Dept: CARDIOLOGY | Facility: CLINIC | Age: 70
End: 2020-10-15
Payer: COMMERCIAL

## 2020-10-15 ENCOUNTER — LAB VISIT (OUTPATIENT)
Dept: LAB | Facility: HOSPITAL | Age: 70
End: 2020-10-15
Payer: COMMERCIAL

## 2020-10-15 DIAGNOSIS — Z79.01 LONG TERM CURRENT USE OF ANTICOAGULANT THERAPY: ICD-10-CM

## 2020-10-15 DIAGNOSIS — D68.61 ANTI-PHOSPHOLIPID ANTIBODY SYNDROME: ICD-10-CM

## 2020-10-15 LAB
INR PPP: 2.6 (ref 0.8–1.2)
PROTHROMBIN TIME: 27.5 SEC (ref 9–12.5)

## 2020-10-15 PROCEDURE — 36415 COLL VENOUS BLD VENIPUNCTURE: CPT | Mod: PO

## 2020-10-15 PROCEDURE — 93793 PR ANTICOAGULANT MGMT FOR PT TAKING WARFARIN: ICD-10-PCS | Mod: S$GLB,,,

## 2020-10-15 PROCEDURE — 93793 ANTICOAG MGMT PT WARFARIN: CPT | Mod: S$GLB,,,

## 2020-10-15 PROCEDURE — 85610 PROTHROMBIN TIME: CPT

## 2020-11-04 ENCOUNTER — PATIENT MESSAGE (OUTPATIENT)
Dept: FAMILY MEDICINE | Facility: CLINIC | Age: 70
End: 2020-11-04

## 2020-11-04 DIAGNOSIS — Z12.5 ENCOUNTER FOR SCREENING FOR MALIGNANT NEOPLASM OF PROSTATE: ICD-10-CM

## 2020-11-04 DIAGNOSIS — D68.61 ANTI-PHOSPHOLIPID ANTIBODY SYNDROME: ICD-10-CM

## 2020-11-04 DIAGNOSIS — Z00.00 ROUTINE GENERAL MEDICAL EXAMINATION AT A HEALTH CARE FACILITY: Primary | ICD-10-CM

## 2020-11-12 ENCOUNTER — OFFICE VISIT (OUTPATIENT)
Dept: FAMILY MEDICINE | Facility: CLINIC | Age: 70
End: 2020-11-12
Attending: FAMILY MEDICINE
Payer: COMMERCIAL

## 2020-11-12 ENCOUNTER — LAB VISIT (OUTPATIENT)
Dept: LAB | Facility: HOSPITAL | Age: 70
End: 2020-11-12
Attending: INTERNAL MEDICINE
Payer: COMMERCIAL

## 2020-11-12 VITALS — TEMPERATURE: 98 F | WEIGHT: 199.31 LBS | HEIGHT: 70 IN | BODY MASS INDEX: 28.53 KG/M2

## 2020-11-12 DIAGNOSIS — Z00.00 ROUTINE GENERAL MEDICAL EXAMINATION AT HEALTH CARE FACILITY: Primary | ICD-10-CM

## 2020-11-12 DIAGNOSIS — M32.8 OTHER FORMS OF SYSTEMIC LUPUS ERYTHEMATOSUS, UNSPECIFIED ORGAN INVOLVEMENT STATUS: ICD-10-CM

## 2020-11-12 DIAGNOSIS — D68.61 ANTI-PHOSPHOLIPID ANTIBODY SYNDROME: Chronic | ICD-10-CM

## 2020-11-12 DIAGNOSIS — G25.81 RESTLESS LEGS SYNDROME (RLS): Chronic | ICD-10-CM

## 2020-11-12 DIAGNOSIS — Z12.5 ENCOUNTER FOR SCREENING FOR MALIGNANT NEOPLASM OF PROSTATE: ICD-10-CM

## 2020-11-12 DIAGNOSIS — Z23 IMMUNIZATION DUE: ICD-10-CM

## 2020-11-12 DIAGNOSIS — Z00.00 ROUTINE GENERAL MEDICAL EXAMINATION AT A HEALTH CARE FACILITY: ICD-10-CM

## 2020-11-12 DIAGNOSIS — Z79.01 LONG TERM CURRENT USE OF ANTICOAGULANT THERAPY: ICD-10-CM

## 2020-11-12 DIAGNOSIS — R01.1 MURMUR: ICD-10-CM

## 2020-11-12 DIAGNOSIS — D68.61 ANTI-PHOSPHOLIPID ANTIBODY SYNDROME: ICD-10-CM

## 2020-11-12 LAB
ALBUMIN SERPL BCP-MCNC: 4.1 G/DL (ref 3.5–5.2)
ALP SERPL-CCNC: 57 U/L (ref 55–135)
ALT SERPL W/O P-5'-P-CCNC: 23 U/L (ref 10–44)
ANION GAP SERPL CALC-SCNC: 8 MMOL/L (ref 8–16)
AST SERPL-CCNC: 26 U/L (ref 10–40)
BASOPHILS # BLD AUTO: 0.03 K/UL (ref 0–0.2)
BASOPHILS NFR BLD: 0.8 % (ref 0–1.9)
BILIRUB SERPL-MCNC: 0.5 MG/DL (ref 0.1–1)
BUN SERPL-MCNC: 20 MG/DL (ref 8–23)
CALCIUM SERPL-MCNC: 9.5 MG/DL (ref 8.7–10.5)
CHLORIDE SERPL-SCNC: 103 MMOL/L (ref 95–110)
CHOLEST SERPL-MCNC: 175 MG/DL (ref 120–199)
CHOLEST/HDLC SERPL: 4.2 {RATIO} (ref 2–5)
CO2 SERPL-SCNC: 30 MMOL/L (ref 23–29)
COMPLEXED PSA SERPL-MCNC: 0.64 NG/ML (ref 0–4)
CREAT SERPL-MCNC: 1 MG/DL (ref 0.5–1.4)
DIFFERENTIAL METHOD: ABNORMAL
EOSINOPHIL # BLD AUTO: 0.1 K/UL (ref 0–0.5)
EOSINOPHIL NFR BLD: 3.2 % (ref 0–8)
ERYTHROCYTE [DISTWIDTH] IN BLOOD BY AUTOMATED COUNT: 12.4 % (ref 11.5–14.5)
EST. GFR  (AFRICAN AMERICAN): >60 ML/MIN/1.73 M^2
EST. GFR  (NON AFRICAN AMERICAN): >60 ML/MIN/1.73 M^2
GLUCOSE SERPL-MCNC: 84 MG/DL (ref 70–110)
HCT VFR BLD AUTO: 41.3 % (ref 40–54)
HDLC SERPL-MCNC: 42 MG/DL (ref 40–75)
HDLC SERPL: 24 % (ref 20–50)
HGB BLD-MCNC: 13.3 G/DL (ref 14–18)
IMM GRANULOCYTES # BLD AUTO: 0.01 K/UL (ref 0–0.04)
IMM GRANULOCYTES NFR BLD AUTO: 0.3 % (ref 0–0.5)
INR PPP: 2.2 (ref 0.8–1.2)
LDLC SERPL CALC-MCNC: 111.6 MG/DL (ref 63–159)
LYMPHOCYTES # BLD AUTO: 1.2 K/UL (ref 1–4.8)
LYMPHOCYTES NFR BLD: 31.2 % (ref 18–48)
MCH RBC QN AUTO: 30.5 PG (ref 27–31)
MCHC RBC AUTO-ENTMCNC: 32.2 G/DL (ref 32–36)
MCV RBC AUTO: 95 FL (ref 82–98)
MONOCYTES # BLD AUTO: 0.4 K/UL (ref 0.3–1)
MONOCYTES NFR BLD: 10.7 % (ref 4–15)
NEUTROPHILS # BLD AUTO: 2 K/UL (ref 1.8–7.7)
NEUTROPHILS NFR BLD: 53.8 % (ref 38–73)
NONHDLC SERPL-MCNC: 133 MG/DL
NRBC BLD-RTO: 0 /100 WBC
PLATELET # BLD AUTO: 234 K/UL (ref 150–350)
PMV BLD AUTO: 9.9 FL (ref 9.2–12.9)
POTASSIUM SERPL-SCNC: 4.7 MMOL/L (ref 3.5–5.1)
PROT SERPL-MCNC: 6.9 G/DL (ref 6–8.4)
PROTHROMBIN TIME: 23.9 SEC (ref 9–12.5)
RBC # BLD AUTO: 4.36 M/UL (ref 4.6–6.2)
SODIUM SERPL-SCNC: 141 MMOL/L (ref 136–145)
TRIGL SERPL-MCNC: 107 MG/DL (ref 30–150)
WBC # BLD AUTO: 3.75 K/UL (ref 3.9–12.7)

## 2020-11-12 PROCEDURE — 85025 COMPLETE CBC W/AUTO DIFF WBC: CPT

## 2020-11-12 PROCEDURE — 99397 PR PREVENTIVE VISIT,EST,65 & OVER: ICD-10-PCS | Mod: 25,S$GLB,, | Performed by: FAMILY MEDICINE

## 2020-11-12 PROCEDURE — 3008F PR BODY MASS INDEX (BMI) DOCUMENTED: ICD-10-PCS | Mod: CPTII,S$GLB,, | Performed by: FAMILY MEDICINE

## 2020-11-12 PROCEDURE — 99999 PR PBB SHADOW E&M-EST. PATIENT-LVL III: ICD-10-PCS | Mod: PBBFAC,,, | Performed by: FAMILY MEDICINE

## 2020-11-12 PROCEDURE — 99999 PR PBB SHADOW E&M-EST. PATIENT-LVL III: CPT | Mod: PBBFAC,,, | Performed by: FAMILY MEDICINE

## 2020-11-12 PROCEDURE — 36415 COLL VENOUS BLD VENIPUNCTURE: CPT | Mod: PO

## 2020-11-12 PROCEDURE — 84153 ASSAY OF PSA TOTAL: CPT

## 2020-11-12 PROCEDURE — 90694 VACC AIIV4 NO PRSRV 0.5ML IM: CPT | Mod: S$GLB,,, | Performed by: FAMILY MEDICINE

## 2020-11-12 PROCEDURE — 80053 COMPREHEN METABOLIC PANEL: CPT

## 2020-11-12 PROCEDURE — 90471 IMMUNIZATION ADMIN: CPT | Mod: S$GLB,,, | Performed by: FAMILY MEDICINE

## 2020-11-12 PROCEDURE — 85610 PROTHROMBIN TIME: CPT

## 2020-11-12 PROCEDURE — 90471 FLU VACCINE - QUADRIVALENT - ADJUVANTED: ICD-10-PCS | Mod: S$GLB,,, | Performed by: FAMILY MEDICINE

## 2020-11-12 PROCEDURE — 3008F BODY MASS INDEX DOCD: CPT | Mod: CPTII,S$GLB,, | Performed by: FAMILY MEDICINE

## 2020-11-12 PROCEDURE — 90694 FLU VACCINE - QUADRIVALENT - ADJUVANTED: ICD-10-PCS | Mod: S$GLB,,, | Performed by: FAMILY MEDICINE

## 2020-11-12 PROCEDURE — 80061 LIPID PANEL: CPT

## 2020-11-12 PROCEDURE — 99397 PER PM REEVAL EST PAT 65+ YR: CPT | Mod: 25,S$GLB,, | Performed by: FAMILY MEDICINE

## 2020-11-12 NOTE — PROGRESS NOTES
Subjective:       Patient ID: Viraj Olsen is a 70 y.o. male.    Chief Complaint: Annual Exam    70 yr old pleasant white male with antiphospholipid antibody syndrome, SLE, RLS, and no other significant chronic medical history presents today as new patient and establishing primary care and annual wellness exam and lab work. C/o increase flatulence and knee instability and would like spoecialist.    APAS/SLE - diagnosed about 20-21 years ago - on coumadin and plaquenil since then - had multiple TIA prior to being diagnosed with APAS 21 years ago - no issues - follows Rheumatology      RLS - controlled       History as below - reviewed in detail      HM  -labs due  -PSA due          Review of Systems   Constitutional: Negative.  Negative for activity change, diaphoresis and unexpected weight change.   HENT: Negative.  Negative for nasal congestion, ear pain, mouth sores, rhinorrhea and voice change.    Eyes: Negative.  Negative for pain, discharge and visual disturbance.   Respiratory: Negative.  Negative for apnea, cough and wheezing.    Cardiovascular: Negative.  Negative for chest pain and palpitations.   Gastrointestinal: Negative.  Negative for abdominal distention, anal bleeding, diarrhea and vomiting.   Endocrine: Negative.  Negative for cold intolerance and polyuria.   Genitourinary: Negative.  Negative for decreased urine volume, difficulty urinating, discharge, frequency and scrotal swelling.   Musculoskeletal: Negative.  Negative for back pain, myalgias and neck stiffness.   Integumentary:  Negative for color change and rash. Negative.   Allergic/Immunologic: Negative.  Negative for environmental allergies.   Neurological: Negative.  Negative for dizziness, speech difficulty, weakness and light-headedness.   Hematological: Negative.    Psychiatric/Behavioral: Negative.  Negative for agitation, dysphoric mood and suicidal ideas. The patient is not nervous/anxious.      PMH/PSH/FH/SH/MED/ALLERGY  reviewed        Objective:       Vitals:    11/12/20 1622   Temp: 98.3 °F (36.8 °C)       Physical Exam  Constitutional:       Appearance: He is well-developed.   HENT:      Head: Normocephalic and atraumatic.      Right Ear: External ear normal.      Left Ear: External ear normal.      Nose: Nose normal.      Mouth/Throat:      Pharynx: No oropharyngeal exudate.   Eyes:      General: No scleral icterus.        Right eye: No discharge.         Left eye: No discharge.      Conjunctiva/sclera: Conjunctivae normal.      Pupils: Pupils are equal, round, and reactive to light.   Neck:      Musculoskeletal: Normal range of motion and neck supple.      Thyroid: No thyromegaly.      Vascular: No JVD.      Trachea: No tracheal deviation.   Cardiovascular:      Rate and Rhythm: Normal rate and regular rhythm.      Heart sounds: Murmur (2/6 esm best heard in aortic area) present. No friction rub. No gallop.    Pulmonary:      Effort: Pulmonary effort is normal. No respiratory distress.      Breath sounds: Normal breath sounds. No stridor. No wheezing or rales.   Chest:      Chest wall: No tenderness.   Abdominal:      General: Bowel sounds are normal. There is no distension.      Palpations: Abdomen is soft. There is no mass.      Tenderness: There is no abdominal tenderness. There is no guarding or rebound.      Hernia: No hernia is present.   Musculoskeletal: Normal range of motion.         General: No tenderness.   Lymphadenopathy:      Cervical: No cervical adenopathy.   Skin:     General: Skin is warm and dry.      Coloration: Skin is not pale.      Findings: No erythema or rash.   Neurological:      Mental Status: He is alert and oriented to person, place, and time.      Cranial Nerves: No cranial nerve deficit.      Motor: No abnormal muscle tone.      Coordination: Coordination normal.      Deep Tendon Reflexes: Reflexes are normal and symmetric. Reflexes normal.   Psychiatric:         Behavior: Behavior normal.          Thought Content: Thought content normal.         Judgment: Judgment normal.         Assessment:       1. Routine general medical examination at health care facility    2. Anti-phospholipid antibody syndrome    3. Restless legs syndrome (RLS)    4. Other forms of systemic lupus erythematosus, unspecified organ involvement status    5. Immunization due    6. Murmur        Plan:           Viraj was seen today for annual exam.    Diagnoses and all orders for this visit:    Routine general medical examination at health care facility    Anti-phospholipid antibody syndrome    Restless legs syndrome (RLS)    Other forms of systemic lupus erythematosus, unspecified organ involvement status    Immunization due  -     Influenza (FLUAD) - Quadrivalent (Adjuvanted) *Preferred* (65+) (PF)    Murmur  -     Echo Color Flow Doppler? Yes; Future      Wellness check  -normal exam  -labs      APAS/LUPUS  -on plaquenil and coumadin  -follows Rheumatology    Heart murmur  -echo for surveillance    Spent adequate time in obtaining history and detailed exam      Follow up in about 1 year (around 11/12/2021), or if symptoms worsen or fail to improve.

## 2020-11-13 ENCOUNTER — ANTI-COAG VISIT (OUTPATIENT)
Dept: CARDIOLOGY | Facility: CLINIC | Age: 70
End: 2020-11-13
Payer: COMMERCIAL

## 2020-11-13 DIAGNOSIS — D68.61 ANTI-PHOSPHOLIPID ANTIBODY SYNDROME: ICD-10-CM

## 2020-11-13 DIAGNOSIS — Z79.01 LONG TERM CURRENT USE OF ANTICOAGULANT THERAPY: ICD-10-CM

## 2020-11-13 PROCEDURE — 93793 PR ANTICOAGULANT MGMT FOR PT TAKING WARFARIN: ICD-10-PCS | Mod: S$GLB,,,

## 2020-11-13 PROCEDURE — 93793 ANTICOAG MGMT PT WARFARIN: CPT | Mod: S$GLB,,,

## 2020-12-01 ENCOUNTER — HOSPITAL ENCOUNTER (OUTPATIENT)
Dept: CARDIOLOGY | Facility: HOSPITAL | Age: 70
Discharge: HOME | End: 2020-12-01
Attending: FAMILY MEDICINE
Payer: COMMERCIAL

## 2020-12-01 ENCOUNTER — ANTI-COAG VISIT (OUTPATIENT)
Dept: CARDIOLOGY | Facility: CLINIC | Age: 70
End: 2020-12-01
Payer: COMMERCIAL

## 2020-12-01 VITALS — HEIGHT: 70 IN | BODY MASS INDEX: 28.49 KG/M2 | WEIGHT: 199 LBS

## 2020-12-01 DIAGNOSIS — Z79.01 LONG TERM CURRENT USE OF ANTICOAGULANT THERAPY: ICD-10-CM

## 2020-12-01 DIAGNOSIS — R01.1 MURMUR: ICD-10-CM

## 2020-12-01 DIAGNOSIS — D68.61 ANTI-PHOSPHOLIPID ANTIBODY SYNDROME: ICD-10-CM

## 2020-12-01 LAB
AORTIC ROOT ANNULUS: 3.02 CM
ASCENDING AORTA: 3.14 CM
AV INDEX (PROSTH): 0.38
AV MEAN GRADIENT: 13 MMHG
AV PEAK GRADIENT: 24 MMHG
AV VALVE AREA: 1.36 CM2
AV VELOCITY RATIO: 0.39
BSA FOR ECHO PROCEDURE: 2.11 M2
CV ECHO LV RWT: 0.33 CM
DOP CALC AO PEAK VEL: 2.45 M/S
DOP CALC AO VTI: 47.14 CM
DOP CALC LVOT AREA: 3.6 CM2
DOP CALC LVOT DIAMETER: 2.13 CM
DOP CALC LVOT PEAK VEL: 0.95 M/S
DOP CALC LVOT STROKE VOLUME: 64.11 CM3
DOP CALCLVOT PEAK VEL VTI: 18 CM
E WAVE DECELERATION TIME: 268.23 MSEC
E/A RATIO: 0.96
E/E' RATIO: 5.74 M/S
ECHO LV POSTERIOR WALL: 0.74 CM (ref 0.6–1.1)
FRACTIONAL SHORTENING: 56 % (ref 28–44)
INTERVENTRICULAR SEPTUM: 0.76 CM (ref 0.6–1.1)
LA MAJOR: 4.6 CM
LA MINOR: 4.7 CM
LA WIDTH: 3.3 CM
LEFT ATRIUM SIZE: 2.99 CM
LEFT ATRIUM VOLUME INDEX: 18.7 ML/M2
LEFT ATRIUM VOLUME: 38.99 CM3
LEFT INTERNAL DIMENSION IN SYSTOLE: 2 CM (ref 2.1–4)
LEFT VENTRICLE DIASTOLIC VOLUME INDEX: 44.96 ML/M2
LEFT VENTRICLE DIASTOLIC VOLUME: 93.65 ML
LEFT VENTRICLE MASS INDEX: 51 G/M2
LEFT VENTRICLE SYSTOLIC VOLUME INDEX: 8.6 ML/M2
LEFT VENTRICLE SYSTOLIC VOLUME: 17.86 ML
LEFT VENTRICULAR INTERNAL DIMENSION IN DIASTOLE: 4.52 CM (ref 3.5–6)
LEFT VENTRICULAR MASS: 105.28 G
LV LATERAL E/E' RATIO: 5.5 M/S
LV SEPTAL E/E' RATIO: 6 M/S
MV PEAK A VEL: 0.69 M/S
MV PEAK E VEL: 0.66 M/S
MV STENOSIS PRESSURE HALF TIME: 77.79 MS
MV VALVE AREA P 1/2 METHOD: 2.83 CM2
PISA TR MAX VEL: 2.37 M/S
PULM VEIN S/D RATIO: 1.71
PV PEAK D VEL: 0.35 M/S
PV PEAK S VEL: 0.6 M/S
PV PEAK VELOCITY: 0.85 CM/S
RA MAJOR: 4.6 CM
RA PRESSURE: 3 MMHG
RA WIDTH: 3.5 CM
RIGHT VENTRICULAR END-DIASTOLIC DIMENSION: 3 CM
RIGHT VENTRICULAR LENGTH IN DIASTOLE (APICAL 4-CHAMBER VIEW): 5.4 CM
STJ: 3.13 CM
TDI LATERAL: 0.12 M/S
TDI SEPTAL: 0.11 M/S
TDI: 0.12 M/S
TR MAX PG: 22 MMHG
TRICUSPID ANNULAR PLANE SYSTOLIC EXCURSION: 1.99 CM
TV REST PULMONARY ARTERY PRESSURE: 25 MMHG

## 2020-12-01 PROCEDURE — 93306 TTE W/DOPPLER COMPLETE: CPT

## 2020-12-01 PROCEDURE — 93306 ECHO (CUPID ONLY): ICD-10-PCS | Mod: 26,,, | Performed by: INTERNAL MEDICINE

## 2020-12-01 PROCEDURE — 93793 PR ANTICOAGULANT MGMT FOR PT TAKING WARFARIN: ICD-10-PCS | Mod: S$GLB,,,

## 2020-12-01 PROCEDURE — 93793 ANTICOAG MGMT PT WARFARIN: CPT | Mod: S$GLB,,,

## 2020-12-01 PROCEDURE — 93306 TTE W/DOPPLER COMPLETE: CPT | Mod: 26,,, | Performed by: INTERNAL MEDICINE

## 2020-12-01 NOTE — PROGRESS NOTES
INR at goal. Medications and chart reviewed. No changes noted to necessitate adjustment of warfarin or follow-up plan. See calendar.

## 2020-12-29 ENCOUNTER — LAB VISIT (OUTPATIENT)
Dept: LAB | Facility: HOSPITAL | Age: 70
End: 2020-12-29
Attending: INTERNAL MEDICINE
Payer: COMMERCIAL

## 2020-12-29 DIAGNOSIS — D68.61 ANTI-PHOSPHOLIPID ANTIBODY SYNDROME: ICD-10-CM

## 2020-12-29 DIAGNOSIS — Z79.01 LONG TERM CURRENT USE OF ANTICOAGULANT THERAPY: ICD-10-CM

## 2020-12-29 LAB
INR PPP: 3.2 (ref 0.8–1.2)
PROTHROMBIN TIME: 32.8 SEC (ref 9–12.5)

## 2020-12-29 PROCEDURE — 36415 COLL VENOUS BLD VENIPUNCTURE: CPT | Mod: PO

## 2020-12-29 PROCEDURE — 85610 PROTHROMBIN TIME: CPT

## 2020-12-30 ENCOUNTER — ANTI-COAG VISIT (OUTPATIENT)
Dept: CARDIOLOGY | Facility: CLINIC | Age: 70
End: 2020-12-30
Payer: COMMERCIAL

## 2020-12-30 DIAGNOSIS — Z79.01 LONG TERM CURRENT USE OF ANTICOAGULANT THERAPY: ICD-10-CM

## 2020-12-30 DIAGNOSIS — D68.61 ANTI-PHOSPHOLIPID ANTIBODY SYNDROME: ICD-10-CM

## 2020-12-30 PROCEDURE — 93793 PR ANTICOAGULANT MGMT FOR PT TAKING WARFARIN: ICD-10-PCS | Mod: S$GLB,,,

## 2020-12-30 PROCEDURE — 93793 ANTICOAG MGMT PT WARFARIN: CPT | Mod: S$GLB,,,

## 2021-01-26 ENCOUNTER — LAB VISIT (OUTPATIENT)
Dept: LAB | Facility: HOSPITAL | Age: 71
End: 2021-01-26
Attending: INTERNAL MEDICINE
Payer: COMMERCIAL

## 2021-01-26 DIAGNOSIS — Z79.899 HIGH RISK MEDICATION USE: ICD-10-CM

## 2021-01-26 DIAGNOSIS — D68.61 ANTI-PHOSPHOLIPID ANTIBODY SYNDROME: Chronic | ICD-10-CM

## 2021-01-26 DIAGNOSIS — Z79.01 LONG TERM CURRENT USE OF ANTICOAGULANT THERAPY: ICD-10-CM

## 2021-01-26 DIAGNOSIS — M32.19 OTHER SYSTEMIC LUPUS ERYTHEMATOSUS WITH OTHER ORGAN INVOLVEMENT: ICD-10-CM

## 2021-01-26 LAB
ALBUMIN SERPL BCP-MCNC: 4.2 G/DL (ref 3.5–5.2)
ALP SERPL-CCNC: 76 U/L (ref 55–135)
ALT SERPL W/O P-5'-P-CCNC: 38 U/L (ref 10–44)
ANION GAP SERPL CALC-SCNC: 7 MMOL/L (ref 8–16)
AST SERPL-CCNC: 30 U/L (ref 10–40)
BASOPHILS # BLD AUTO: 0.03 K/UL (ref 0–0.2)
BASOPHILS NFR BLD: 0.7 % (ref 0–1.9)
BILIRUB SERPL-MCNC: 0.2 MG/DL (ref 0.1–1)
BUN SERPL-MCNC: 19 MG/DL (ref 8–23)
CALCIUM SERPL-MCNC: 9.8 MG/DL (ref 8.7–10.5)
CHLORIDE SERPL-SCNC: 99 MMOL/L (ref 95–110)
CO2 SERPL-SCNC: 30 MMOL/L (ref 23–29)
CREAT SERPL-MCNC: 1 MG/DL (ref 0.5–1.4)
CRP SERPL-MCNC: 1.5 MG/L (ref 0–8.2)
DAT IGG-SP REAG RBC-IMP: NORMAL
DIFFERENTIAL METHOD: ABNORMAL
EOSINOPHIL # BLD AUTO: 0.2 K/UL (ref 0–0.5)
EOSINOPHIL NFR BLD: 4.3 % (ref 0–8)
ERYTHROCYTE [DISTWIDTH] IN BLOOD BY AUTOMATED COUNT: 12.8 % (ref 11.5–14.5)
ERYTHROCYTE [SEDIMENTATION RATE] IN BLOOD BY WESTERGREN METHOD: 16 MM/HR (ref 0–23)
EST. GFR  (AFRICAN AMERICAN): >60 ML/MIN/1.73 M^2
EST. GFR  (NON AFRICAN AMERICAN): >60 ML/MIN/1.73 M^2
GLUCOSE SERPL-MCNC: 91 MG/DL (ref 70–110)
HCT VFR BLD AUTO: 42.1 % (ref 40–54)
HGB BLD-MCNC: 14.3 G/DL (ref 14–18)
IMM GRANULOCYTES # BLD AUTO: 0.03 K/UL (ref 0–0.04)
IMM GRANULOCYTES NFR BLD AUTO: 0.7 % (ref 0–0.5)
INR PPP: 3.1 (ref 0.8–1.2)
LYMPHOCYTES # BLD AUTO: 1.4 K/UL (ref 1–4.8)
LYMPHOCYTES NFR BLD: 30.7 % (ref 18–48)
MCH RBC QN AUTO: 31.4 PG (ref 27–31)
MCHC RBC AUTO-ENTMCNC: 34 G/DL (ref 32–36)
MCV RBC AUTO: 93 FL (ref 82–98)
MONOCYTES # BLD AUTO: 0.4 K/UL (ref 0.3–1)
MONOCYTES NFR BLD: 8.9 % (ref 4–15)
NEUTROPHILS # BLD AUTO: 2.5 K/UL (ref 1.8–7.7)
NEUTROPHILS NFR BLD: 54.7 % (ref 38–73)
NRBC BLD-RTO: 0 /100 WBC
PLATELET # BLD AUTO: 234 K/UL (ref 150–350)
PMV BLD AUTO: 10.2 FL (ref 9.2–12.9)
POTASSIUM SERPL-SCNC: 4.2 MMOL/L (ref 3.5–5.1)
PROT SERPL-MCNC: 7.5 G/DL (ref 6–8.4)
PROTHROMBIN TIME: 31.6 SEC (ref 9–12.5)
RBC # BLD AUTO: 4.55 M/UL (ref 4.6–6.2)
SODIUM SERPL-SCNC: 136 MMOL/L (ref 136–145)
WBC # BLD AUTO: 4.6 K/UL (ref 3.9–12.7)

## 2021-01-26 PROCEDURE — 80053 COMPREHEN METABOLIC PANEL: CPT

## 2021-01-26 PROCEDURE — 85652 RBC SED RATE AUTOMATED: CPT

## 2021-01-26 PROCEDURE — 86160 COMPLEMENT ANTIGEN: CPT | Mod: 59

## 2021-01-26 PROCEDURE — 36415 COLL VENOUS BLD VENIPUNCTURE: CPT | Mod: PO

## 2021-01-26 PROCEDURE — 86140 C-REACTIVE PROTEIN: CPT

## 2021-01-26 PROCEDURE — 85610 PROTHROMBIN TIME: CPT

## 2021-01-26 PROCEDURE — 86225 DNA ANTIBODY NATIVE: CPT | Mod: 59

## 2021-01-26 PROCEDURE — 86880 COOMBS TEST DIRECT: CPT

## 2021-01-26 PROCEDURE — 86225 DNA ANTIBODY NATIVE: CPT

## 2021-01-26 PROCEDURE — 85025 COMPLETE CBC W/AUTO DIFF WBC: CPT

## 2021-01-26 PROCEDURE — 86160 COMPLEMENT ANTIGEN: CPT

## 2021-01-27 ENCOUNTER — ANTI-COAG VISIT (OUTPATIENT)
Dept: CARDIOLOGY | Facility: CLINIC | Age: 71
End: 2021-01-27
Payer: COMMERCIAL

## 2021-01-27 DIAGNOSIS — D68.61 ANTI-PHOSPHOLIPID ANTIBODY SYNDROME: Primary | ICD-10-CM

## 2021-01-27 DIAGNOSIS — Z79.01 LONG TERM CURRENT USE OF ANTICOAGULANT THERAPY: ICD-10-CM

## 2021-01-27 LAB
C3 SERPL-MCNC: 77 MG/DL (ref 50–180)
C4 SERPL-MCNC: 12 MG/DL (ref 11–44)

## 2021-01-27 PROCEDURE — 93793 ANTICOAG MGMT PT WARFARIN: CPT | Mod: S$GLB,,,

## 2021-01-27 PROCEDURE — 93793 PR ANTICOAGULANT MGMT FOR PT TAKING WARFARIN: ICD-10-PCS | Mod: S$GLB,,,

## 2021-01-28 LAB
DNA TITER: NORMAL
DSDNA AB SER-ACNC: POSITIVE [IU]/ML

## 2021-02-10 ENCOUNTER — PATIENT MESSAGE (OUTPATIENT)
Dept: RHEUMATOLOGY | Facility: CLINIC | Age: 71
End: 2021-02-10

## 2021-02-19 ENCOUNTER — OFFICE VISIT (OUTPATIENT)
Dept: RHEUMATOLOGY | Facility: CLINIC | Age: 71
End: 2021-02-19
Payer: COMMERCIAL

## 2021-02-19 VITALS
SYSTOLIC BLOOD PRESSURE: 120 MMHG | DIASTOLIC BLOOD PRESSURE: 73 MMHG | HEART RATE: 76 BPM | WEIGHT: 208.56 LBS | BODY MASS INDEX: 29.92 KG/M2

## 2021-02-19 DIAGNOSIS — M17.30 POST-TRAUMATIC OSTEOARTHRITIS OF KNEE, UNSPECIFIED LATERALITY: ICD-10-CM

## 2021-02-19 DIAGNOSIS — Z79.899 HIGH RISK MEDICATION USE: ICD-10-CM

## 2021-02-19 DIAGNOSIS — M32.19 OTHER SYSTEMIC LUPUS ERYTHEMATOSUS WITH OTHER ORGAN INVOLVEMENT: Primary | ICD-10-CM

## 2021-02-19 DIAGNOSIS — D68.61 ANTI-PHOSPHOLIPID ANTIBODY SYNDROME: Chronic | ICD-10-CM

## 2021-02-19 PROCEDURE — 99999 PR PBB SHADOW E&M-EST. PATIENT-LVL II: CPT | Mod: PBBFAC,,, | Performed by: INTERNAL MEDICINE

## 2021-02-19 PROCEDURE — 99214 PR OFFICE/OUTPT VISIT, EST, LEVL IV, 30-39 MIN: ICD-10-PCS | Mod: S$GLB,,, | Performed by: INTERNAL MEDICINE

## 2021-02-19 PROCEDURE — 1159F MED LIST DOCD IN RCRD: CPT | Mod: S$GLB,,, | Performed by: INTERNAL MEDICINE

## 2021-02-19 PROCEDURE — 99214 OFFICE O/P EST MOD 30 MIN: CPT | Mod: S$GLB,,, | Performed by: INTERNAL MEDICINE

## 2021-02-19 PROCEDURE — 1159F PR MEDICATION LIST DOCUMENTED IN MEDICAL RECORD: ICD-10-PCS | Mod: S$GLB,,, | Performed by: INTERNAL MEDICINE

## 2021-02-19 PROCEDURE — 99999 PR PBB SHADOW E&M-EST. PATIENT-LVL II: ICD-10-PCS | Mod: PBBFAC,,, | Performed by: INTERNAL MEDICINE

## 2021-02-19 PROCEDURE — 3008F BODY MASS INDEX DOCD: CPT | Mod: CPTII,S$GLB,, | Performed by: INTERNAL MEDICINE

## 2021-02-19 PROCEDURE — 3008F PR BODY MASS INDEX (BMI) DOCUMENTED: ICD-10-PCS | Mod: CPTII,S$GLB,, | Performed by: INTERNAL MEDICINE

## 2021-02-19 PROCEDURE — 1126F PR PAIN SEVERITY QUANTIFIED, NO PAIN PRESENT: ICD-10-PCS | Mod: S$GLB,,, | Performed by: INTERNAL MEDICINE

## 2021-02-19 PROCEDURE — 1126F AMNT PAIN NOTED NONE PRSNT: CPT | Mod: S$GLB,,, | Performed by: INTERNAL MEDICINE

## 2021-02-19 RX ORDER — CELECOXIB 200 MG/1
200 CAPSULE ORAL DAILY PRN
Qty: 20 CAPSULE | Refills: 5 | Status: SHIPPED | OUTPATIENT
Start: 2021-02-19 | End: 2021-09-27 | Stop reason: SDUPTHER

## 2021-02-19 ASSESSMENT — ROUTINE ASSESSMENT OF PATIENT INDEX DATA (RAPID3)
FATIGUE SCORE: 0
PSYCHOLOGICAL DISTRESS SCORE: 0
PAIN SCORE: 0
PATIENT GLOBAL ASSESSMENT SCORE: 0
MDHAQ FUNCTION SCORE: 0.3
TOTAL RAPID3 SCORE: 0.33
AM STIFFNESS SCORE: 1, YES

## 2021-02-19 ASSESSMENT — SYSTEMIC LUPUS ERYTHEMATOSUS DISEASE ACTIVITY INDEX (SLEDAI): TOTAL_SCORE: 2

## 2021-02-23 ENCOUNTER — PATIENT MESSAGE (OUTPATIENT)
Dept: RHEUMATOLOGY | Facility: CLINIC | Age: 71
End: 2021-02-23

## 2021-02-23 ENCOUNTER — LAB VISIT (OUTPATIENT)
Dept: LAB | Facility: HOSPITAL | Age: 71
End: 2021-02-23
Attending: INTERNAL MEDICINE
Payer: COMMERCIAL

## 2021-02-23 DIAGNOSIS — Z79.01 LONG TERM CURRENT USE OF ANTICOAGULANT THERAPY: ICD-10-CM

## 2021-02-23 DIAGNOSIS — D68.61 ANTI-PHOSPHOLIPID ANTIBODY SYNDROME: ICD-10-CM

## 2021-02-23 LAB
INR PPP: 2.3 (ref 0.8–1.2)
PROTHROMBIN TIME: 23.8 SEC (ref 9–12.5)

## 2021-02-23 PROCEDURE — 36415 COLL VENOUS BLD VENIPUNCTURE: CPT | Mod: PO

## 2021-02-23 PROCEDURE — 85610 PROTHROMBIN TIME: CPT

## 2021-02-24 ENCOUNTER — ANTI-COAG VISIT (OUTPATIENT)
Dept: CARDIOLOGY | Facility: CLINIC | Age: 71
End: 2021-02-24
Payer: COMMERCIAL

## 2021-02-24 DIAGNOSIS — D68.61 ANTI-PHOSPHOLIPID ANTIBODY SYNDROME: Primary | ICD-10-CM

## 2021-02-24 DIAGNOSIS — Z79.01 LONG TERM CURRENT USE OF ANTICOAGULANT THERAPY: ICD-10-CM

## 2021-02-24 PROCEDURE — 93793 PR ANTICOAGULANT MGMT FOR PT TAKING WARFARIN: ICD-10-PCS | Mod: S$GLB,,,

## 2021-02-24 PROCEDURE — 93793 ANTICOAG MGMT PT WARFARIN: CPT | Mod: S$GLB,,,

## 2021-03-09 ENCOUNTER — LAB VISIT (OUTPATIENT)
Dept: LAB | Facility: HOSPITAL | Age: 71
End: 2021-03-09
Attending: INTERNAL MEDICINE
Payer: COMMERCIAL

## 2021-03-09 DIAGNOSIS — D68.61 ANTI-PHOSPHOLIPID ANTIBODY SYNDROME: ICD-10-CM

## 2021-03-09 DIAGNOSIS — Z79.01 LONG TERM CURRENT USE OF ANTICOAGULANT THERAPY: ICD-10-CM

## 2021-03-09 LAB
INR PPP: 2.5 (ref 0.8–1.2)
PROTHROMBIN TIME: 26.3 SEC (ref 9–12.5)

## 2021-03-09 PROCEDURE — 85610 PROTHROMBIN TIME: CPT | Performed by: INTERNAL MEDICINE

## 2021-03-09 PROCEDURE — 36415 COLL VENOUS BLD VENIPUNCTURE: CPT | Mod: PO | Performed by: INTERNAL MEDICINE

## 2021-03-10 ENCOUNTER — ANTI-COAG VISIT (OUTPATIENT)
Dept: CARDIOLOGY | Facility: CLINIC | Age: 71
End: 2021-03-10
Payer: COMMERCIAL

## 2021-03-10 DIAGNOSIS — Z79.01 LONG TERM CURRENT USE OF ANTICOAGULANT THERAPY: ICD-10-CM

## 2021-03-10 DIAGNOSIS — D68.61 ANTI-PHOSPHOLIPID ANTIBODY SYNDROME: Primary | ICD-10-CM

## 2021-03-10 PROCEDURE — 93793 PR ANTICOAGULANT MGMT FOR PT TAKING WARFARIN: ICD-10-PCS | Mod: S$GLB,,,

## 2021-03-10 PROCEDURE — 93793 ANTICOAG MGMT PT WARFARIN: CPT | Mod: S$GLB,,,

## 2021-03-23 ENCOUNTER — LAB VISIT (OUTPATIENT)
Dept: LAB | Facility: HOSPITAL | Age: 71
End: 2021-03-23
Attending: INTERNAL MEDICINE
Payer: COMMERCIAL

## 2021-03-23 DIAGNOSIS — Z79.01 LONG TERM CURRENT USE OF ANTICOAGULANT THERAPY: ICD-10-CM

## 2021-03-23 DIAGNOSIS — D68.61 ANTI-PHOSPHOLIPID ANTIBODY SYNDROME: ICD-10-CM

## 2021-03-23 PROCEDURE — 36415 COLL VENOUS BLD VENIPUNCTURE: CPT | Mod: PO | Performed by: INTERNAL MEDICINE

## 2021-03-23 PROCEDURE — 85610 PROTHROMBIN TIME: CPT | Performed by: INTERNAL MEDICINE

## 2021-03-24 ENCOUNTER — ANTI-COAG VISIT (OUTPATIENT)
Dept: CARDIOLOGY | Facility: CLINIC | Age: 71
End: 2021-03-24
Payer: COMMERCIAL

## 2021-03-24 DIAGNOSIS — D68.61 ANTI-PHOSPHOLIPID ANTIBODY SYNDROME: Primary | ICD-10-CM

## 2021-03-24 DIAGNOSIS — Z79.01 LONG TERM CURRENT USE OF ANTICOAGULANT THERAPY: ICD-10-CM

## 2021-03-24 LAB
INR PPP: 2.4 (ref 0.8–1.2)
PROTHROMBIN TIME: 24.9 SEC (ref 9–12.5)

## 2021-03-24 PROCEDURE — 93793 ANTICOAG MGMT PT WARFARIN: CPT | Mod: S$GLB,,,

## 2021-03-24 PROCEDURE — 93793 PR ANTICOAGULANT MGMT FOR PT TAKING WARFARIN: ICD-10-PCS | Mod: S$GLB,,,

## 2021-04-08 ENCOUNTER — LAB VISIT (OUTPATIENT)
Dept: LAB | Facility: HOSPITAL | Age: 71
End: 2021-04-08
Attending: INTERNAL MEDICINE
Payer: COMMERCIAL

## 2021-04-08 DIAGNOSIS — Z79.01 LONG TERM CURRENT USE OF ANTICOAGULANT THERAPY: ICD-10-CM

## 2021-04-08 DIAGNOSIS — D68.61 ANTI-PHOSPHOLIPID ANTIBODY SYNDROME: ICD-10-CM

## 2021-04-08 PROCEDURE — 36415 COLL VENOUS BLD VENIPUNCTURE: CPT | Mod: PO | Performed by: INTERNAL MEDICINE

## 2021-04-08 PROCEDURE — 85610 PROTHROMBIN TIME: CPT | Performed by: INTERNAL MEDICINE

## 2021-04-09 ENCOUNTER — ANTI-COAG VISIT (OUTPATIENT)
Dept: CARDIOLOGY | Facility: CLINIC | Age: 71
End: 2021-04-09
Payer: COMMERCIAL

## 2021-04-09 DIAGNOSIS — Z79.01 LONG TERM CURRENT USE OF ANTICOAGULANT THERAPY: ICD-10-CM

## 2021-04-09 DIAGNOSIS — D68.61 ANTI-PHOSPHOLIPID ANTIBODY SYNDROME: Primary | ICD-10-CM

## 2021-04-09 LAB
INR PPP: 2.7 (ref 0.8–1.2)
PROTHROMBIN TIME: 28.2 SEC (ref 9–12.5)

## 2021-04-09 PROCEDURE — 93793 ANTICOAG MGMT PT WARFARIN: CPT | Mod: S$GLB,,,

## 2021-04-09 PROCEDURE — 93793 PR ANTICOAGULANT MGMT FOR PT TAKING WARFARIN: ICD-10-PCS | Mod: S$GLB,,,

## 2021-04-20 ENCOUNTER — LAB VISIT (OUTPATIENT)
Dept: LAB | Facility: HOSPITAL | Age: 71
End: 2021-04-20
Attending: INTERNAL MEDICINE
Payer: COMMERCIAL

## 2021-04-20 DIAGNOSIS — D68.61 ANTI-PHOSPHOLIPID ANTIBODY SYNDROME: ICD-10-CM

## 2021-04-20 DIAGNOSIS — Z79.01 LONG TERM CURRENT USE OF ANTICOAGULANT THERAPY: ICD-10-CM

## 2021-04-20 LAB
INR PPP: 3.9 (ref 0.8–1.2)
PROTHROMBIN TIME: 39.6 SEC (ref 9–12.5)

## 2021-04-20 PROCEDURE — 36415 COLL VENOUS BLD VENIPUNCTURE: CPT | Mod: PO | Performed by: INTERNAL MEDICINE

## 2021-04-20 PROCEDURE — 85610 PROTHROMBIN TIME: CPT | Performed by: INTERNAL MEDICINE

## 2021-04-21 ENCOUNTER — ANTI-COAG VISIT (OUTPATIENT)
Dept: CARDIOLOGY | Facility: CLINIC | Age: 71
End: 2021-04-21
Payer: COMMERCIAL

## 2021-04-21 DIAGNOSIS — D68.61 ANTI-PHOSPHOLIPID ANTIBODY SYNDROME: Primary | ICD-10-CM

## 2021-04-21 DIAGNOSIS — Z79.01 LONG TERM CURRENT USE OF ANTICOAGULANT THERAPY: ICD-10-CM

## 2021-04-21 PROCEDURE — 93793 PR ANTICOAGULANT MGMT FOR PT TAKING WARFARIN: ICD-10-PCS | Mod: S$GLB,,,

## 2021-04-21 PROCEDURE — 93793 ANTICOAG MGMT PT WARFARIN: CPT | Mod: S$GLB,,,

## 2021-05-07 ENCOUNTER — LAB VISIT (OUTPATIENT)
Dept: LAB | Facility: HOSPITAL | Age: 71
End: 2021-05-07
Attending: INTERNAL MEDICINE
Payer: COMMERCIAL

## 2021-05-07 DIAGNOSIS — Z79.01 LONG TERM CURRENT USE OF ANTICOAGULANT THERAPY: ICD-10-CM

## 2021-05-07 DIAGNOSIS — D68.61 ANTI-PHOSPHOLIPID ANTIBODY SYNDROME: ICD-10-CM

## 2021-05-07 LAB
INR PPP: 4.4 (ref 0.8–1.2)
PROTHROMBIN TIME: 43.9 SEC (ref 9–12.5)

## 2021-05-07 PROCEDURE — 36415 COLL VENOUS BLD VENIPUNCTURE: CPT | Mod: PO | Performed by: INTERNAL MEDICINE

## 2021-05-07 PROCEDURE — 85610 PROTHROMBIN TIME: CPT | Performed by: INTERNAL MEDICINE

## 2021-05-10 ENCOUNTER — ANTI-COAG VISIT (OUTPATIENT)
Dept: CARDIOLOGY | Facility: CLINIC | Age: 71
End: 2021-05-10
Payer: COMMERCIAL

## 2021-05-10 DIAGNOSIS — Z79.01 LONG TERM CURRENT USE OF ANTICOAGULANT THERAPY: ICD-10-CM

## 2021-05-10 DIAGNOSIS — D68.61 ANTI-PHOSPHOLIPID ANTIBODY SYNDROME: Primary | ICD-10-CM

## 2021-05-10 PROCEDURE — 93793 PR ANTICOAGULANT MGMT FOR PT TAKING WARFARIN: ICD-10-PCS | Mod: S$GLB,,,

## 2021-05-10 PROCEDURE — 93793 ANTICOAG MGMT PT WARFARIN: CPT | Mod: S$GLB,,,

## 2021-05-13 ENCOUNTER — LAB VISIT (OUTPATIENT)
Dept: LAB | Facility: HOSPITAL | Age: 71
End: 2021-05-13
Attending: INTERNAL MEDICINE
Payer: COMMERCIAL

## 2021-05-13 DIAGNOSIS — D68.61 ANTI-PHOSPHOLIPID ANTIBODY SYNDROME: ICD-10-CM

## 2021-05-13 DIAGNOSIS — Z79.01 LONG TERM CURRENT USE OF ANTICOAGULANT THERAPY: ICD-10-CM

## 2021-05-13 LAB
INR PPP: 3.1 (ref 0.8–1.2)
PROTHROMBIN TIME: 31.7 SEC (ref 9–12.5)

## 2021-05-13 PROCEDURE — 36415 COLL VENOUS BLD VENIPUNCTURE: CPT | Mod: PO | Performed by: INTERNAL MEDICINE

## 2021-05-13 PROCEDURE — 85610 PROTHROMBIN TIME: CPT | Performed by: INTERNAL MEDICINE

## 2021-05-14 ENCOUNTER — ANTI-COAG VISIT (OUTPATIENT)
Dept: CARDIOLOGY | Facility: CLINIC | Age: 71
End: 2021-05-14
Payer: COMMERCIAL

## 2021-05-14 DIAGNOSIS — D68.61 ANTI-PHOSPHOLIPID ANTIBODY SYNDROME: Primary | ICD-10-CM

## 2021-05-14 DIAGNOSIS — Z79.01 LONG TERM CURRENT USE OF ANTICOAGULANT THERAPY: ICD-10-CM

## 2021-05-14 PROCEDURE — 93793 PR ANTICOAGULANT MGMT FOR PT TAKING WARFARIN: ICD-10-PCS | Mod: S$GLB,,,

## 2021-05-14 PROCEDURE — 93793 ANTICOAG MGMT PT WARFARIN: CPT | Mod: S$GLB,,,

## 2021-05-27 ENCOUNTER — LAB VISIT (OUTPATIENT)
Dept: LAB | Facility: HOSPITAL | Age: 71
End: 2021-05-27
Attending: INTERNAL MEDICINE
Payer: COMMERCIAL

## 2021-05-27 DIAGNOSIS — Z79.01 LONG TERM CURRENT USE OF ANTICOAGULANT THERAPY: ICD-10-CM

## 2021-05-27 DIAGNOSIS — D68.61 ANTI-PHOSPHOLIPID ANTIBODY SYNDROME: ICD-10-CM

## 2021-05-27 LAB
INR PPP: 3.1 (ref 0.8–1.2)
PROTHROMBIN TIME: 31.8 SEC (ref 9–12.5)

## 2021-05-27 PROCEDURE — 85610 PROTHROMBIN TIME: CPT | Performed by: INTERNAL MEDICINE

## 2021-05-27 PROCEDURE — 36415 COLL VENOUS BLD VENIPUNCTURE: CPT | Mod: PO | Performed by: INTERNAL MEDICINE

## 2021-05-28 ENCOUNTER — ANTI-COAG VISIT (OUTPATIENT)
Dept: CARDIOLOGY | Facility: CLINIC | Age: 71
End: 2021-05-28
Payer: COMMERCIAL

## 2021-05-28 DIAGNOSIS — Z79.01 LONG TERM CURRENT USE OF ANTICOAGULANT THERAPY: ICD-10-CM

## 2021-05-28 DIAGNOSIS — D68.61 ANTI-PHOSPHOLIPID ANTIBODY SYNDROME: Primary | ICD-10-CM

## 2021-05-28 PROCEDURE — 93793 PR ANTICOAGULANT MGMT FOR PT TAKING WARFARIN: ICD-10-PCS | Mod: S$GLB,,,

## 2021-05-28 PROCEDURE — 93793 ANTICOAG MGMT PT WARFARIN: CPT | Mod: S$GLB,,,

## 2021-06-10 ENCOUNTER — TELEPHONE (OUTPATIENT)
Dept: RHEUMATOLOGY | Facility: CLINIC | Age: 71
End: 2021-06-10

## 2021-06-10 ENCOUNTER — LAB VISIT (OUTPATIENT)
Dept: LAB | Facility: HOSPITAL | Age: 71
End: 2021-06-10
Attending: INTERNAL MEDICINE
Payer: COMMERCIAL

## 2021-06-10 DIAGNOSIS — D68.61 ANTI-PHOSPHOLIPID ANTIBODY SYNDROME: ICD-10-CM

## 2021-06-10 DIAGNOSIS — Z79.01 LONG TERM CURRENT USE OF ANTICOAGULANT THERAPY: ICD-10-CM

## 2021-06-10 LAB
INR PPP: 3.7 (ref 0.8–1.2)
PROTHROMBIN TIME: 37 SEC (ref 9–12.5)

## 2021-06-10 PROCEDURE — 36415 COLL VENOUS BLD VENIPUNCTURE: CPT | Mod: PO | Performed by: INTERNAL MEDICINE

## 2021-06-10 PROCEDURE — 85610 PROTHROMBIN TIME: CPT | Performed by: INTERNAL MEDICINE

## 2021-06-11 ENCOUNTER — ANTI-COAG VISIT (OUTPATIENT)
Dept: CARDIOLOGY | Facility: CLINIC | Age: 71
End: 2021-06-11
Payer: COMMERCIAL

## 2021-06-11 ENCOUNTER — TELEPHONE (OUTPATIENT)
Dept: RHEUMATOLOGY | Facility: CLINIC | Age: 71
End: 2021-06-11

## 2021-06-11 DIAGNOSIS — Z79.01 LONG TERM CURRENT USE OF ANTICOAGULANT THERAPY: ICD-10-CM

## 2021-06-11 DIAGNOSIS — D68.61 ANTI-PHOSPHOLIPID ANTIBODY SYNDROME: Primary | ICD-10-CM

## 2021-06-11 PROCEDURE — 93793 PR ANTICOAGULANT MGMT FOR PT TAKING WARFARIN: ICD-10-PCS | Mod: S$GLB,,,

## 2021-06-11 PROCEDURE — 93793 ANTICOAG MGMT PT WARFARIN: CPT | Mod: S$GLB,,,

## 2021-06-24 ENCOUNTER — LAB VISIT (OUTPATIENT)
Dept: LAB | Facility: HOSPITAL | Age: 71
End: 2021-06-24
Attending: INTERNAL MEDICINE
Payer: COMMERCIAL

## 2021-06-24 DIAGNOSIS — Z79.01 LONG TERM CURRENT USE OF ANTICOAGULANT THERAPY: ICD-10-CM

## 2021-06-24 DIAGNOSIS — D68.61 ANTI-PHOSPHOLIPID ANTIBODY SYNDROME: ICD-10-CM

## 2021-06-24 LAB
INR PPP: 3 (ref 0.8–1.2)
PROTHROMBIN TIME: 30.6 SEC (ref 9–12.5)

## 2021-06-24 PROCEDURE — 85610 PROTHROMBIN TIME: CPT | Performed by: INTERNAL MEDICINE

## 2021-06-24 PROCEDURE — 36415 COLL VENOUS BLD VENIPUNCTURE: CPT | Mod: PO | Performed by: INTERNAL MEDICINE

## 2021-06-25 ENCOUNTER — ANTI-COAG VISIT (OUTPATIENT)
Dept: CARDIOLOGY | Facility: CLINIC | Age: 71
End: 2021-06-25
Payer: COMMERCIAL

## 2021-06-25 DIAGNOSIS — D68.61 ANTI-PHOSPHOLIPID ANTIBODY SYNDROME: Primary | ICD-10-CM

## 2021-06-25 DIAGNOSIS — Z79.01 LONG TERM CURRENT USE OF ANTICOAGULANT THERAPY: ICD-10-CM

## 2021-06-25 PROCEDURE — 93793 PR ANTICOAGULANT MGMT FOR PT TAKING WARFARIN: ICD-10-PCS | Mod: S$GLB,,,

## 2021-06-25 PROCEDURE — 93793 ANTICOAG MGMT PT WARFARIN: CPT | Mod: S$GLB,,,

## 2021-07-09 ENCOUNTER — ANTI-COAG VISIT (OUTPATIENT)
Dept: CARDIOLOGY | Facility: CLINIC | Age: 71
End: 2021-07-09
Payer: COMMERCIAL

## 2021-07-09 ENCOUNTER — LAB VISIT (OUTPATIENT)
Dept: LAB | Facility: HOSPITAL | Age: 71
End: 2021-07-09
Attending: INTERNAL MEDICINE
Payer: COMMERCIAL

## 2021-07-09 DIAGNOSIS — Z79.01 LONG TERM CURRENT USE OF ANTICOAGULANT THERAPY: ICD-10-CM

## 2021-07-09 DIAGNOSIS — D68.61 ANTI-PHOSPHOLIPID ANTIBODY SYNDROME: Primary | ICD-10-CM

## 2021-07-09 DIAGNOSIS — D68.61 ANTI-PHOSPHOLIPID ANTIBODY SYNDROME: ICD-10-CM

## 2021-07-09 LAB
INR PPP: 2.6 (ref 0.8–1.2)
PROTHROMBIN TIME: 26.6 SEC (ref 9–12.5)

## 2021-07-09 PROCEDURE — 93793 PR ANTICOAGULANT MGMT FOR PT TAKING WARFARIN: ICD-10-PCS | Mod: S$GLB,,,

## 2021-07-09 PROCEDURE — 36415 COLL VENOUS BLD VENIPUNCTURE: CPT | Performed by: INTERNAL MEDICINE

## 2021-07-09 PROCEDURE — 93793 ANTICOAG MGMT PT WARFARIN: CPT | Mod: S$GLB,,,

## 2021-07-09 PROCEDURE — 85610 PROTHROMBIN TIME: CPT | Performed by: INTERNAL MEDICINE

## 2021-07-29 ENCOUNTER — LAB VISIT (OUTPATIENT)
Dept: LAB | Facility: HOSPITAL | Age: 71
End: 2021-07-29
Attending: INTERNAL MEDICINE
Payer: COMMERCIAL

## 2021-07-29 DIAGNOSIS — Z79.01 LONG TERM CURRENT USE OF ANTICOAGULANT THERAPY: ICD-10-CM

## 2021-07-29 DIAGNOSIS — D68.61 ANTI-PHOSPHOLIPID ANTIBODY SYNDROME: ICD-10-CM

## 2021-07-29 LAB
INR PPP: 3 (ref 0.8–1.2)
PROTHROMBIN TIME: 30.5 SEC (ref 9–12.5)

## 2021-07-29 PROCEDURE — 36415 COLL VENOUS BLD VENIPUNCTURE: CPT | Mod: PO | Performed by: INTERNAL MEDICINE

## 2021-07-29 PROCEDURE — 85610 PROTHROMBIN TIME: CPT | Performed by: INTERNAL MEDICINE

## 2021-07-30 ENCOUNTER — ANTI-COAG VISIT (OUTPATIENT)
Dept: CARDIOLOGY | Facility: CLINIC | Age: 71
End: 2021-07-30
Payer: COMMERCIAL

## 2021-07-30 DIAGNOSIS — D68.61 ANTI-PHOSPHOLIPID ANTIBODY SYNDROME: Primary | ICD-10-CM

## 2021-07-30 DIAGNOSIS — Z79.01 LONG TERM CURRENT USE OF ANTICOAGULANT THERAPY: ICD-10-CM

## 2021-07-30 PROCEDURE — 93793 ANTICOAG MGMT PT WARFARIN: CPT | Mod: S$GLB,,,

## 2021-07-30 PROCEDURE — 93793 PR ANTICOAGULANT MGMT FOR PT TAKING WARFARIN: ICD-10-PCS | Mod: S$GLB,,,

## 2021-08-26 ENCOUNTER — LAB VISIT (OUTPATIENT)
Dept: LAB | Facility: HOSPITAL | Age: 71
End: 2021-08-26
Attending: INTERNAL MEDICINE
Payer: COMMERCIAL

## 2021-08-26 DIAGNOSIS — D68.61 ANTI-PHOSPHOLIPID ANTIBODY SYNDROME: ICD-10-CM

## 2021-08-26 DIAGNOSIS — Z79.01 LONG TERM CURRENT USE OF ANTICOAGULANT THERAPY: ICD-10-CM

## 2021-08-26 LAB
INR PPP: 3.2 (ref 0.8–1.2)
PROTHROMBIN TIME: 32.9 SEC (ref 9–12.5)

## 2021-08-26 PROCEDURE — 85610 PROTHROMBIN TIME: CPT | Performed by: INTERNAL MEDICINE

## 2021-08-26 PROCEDURE — 36415 COLL VENOUS BLD VENIPUNCTURE: CPT | Mod: PO | Performed by: INTERNAL MEDICINE

## 2021-08-27 ENCOUNTER — ANTI-COAG VISIT (OUTPATIENT)
Dept: CARDIOLOGY | Facility: CLINIC | Age: 71
End: 2021-08-27
Payer: COMMERCIAL

## 2021-08-27 DIAGNOSIS — D68.61 ANTI-PHOSPHOLIPID ANTIBODY SYNDROME: Primary | ICD-10-CM

## 2021-08-27 DIAGNOSIS — Z79.01 LONG TERM CURRENT USE OF ANTICOAGULANT THERAPY: ICD-10-CM

## 2021-08-27 PROCEDURE — 93793 ANTICOAG MGMT PT WARFARIN: CPT | Mod: S$GLB,,,

## 2021-08-27 PROCEDURE — 93793 PR ANTICOAGULANT MGMT FOR PT TAKING WARFARIN: ICD-10-PCS | Mod: S$GLB,,,

## 2021-09-23 ENCOUNTER — LAB VISIT (OUTPATIENT)
Dept: LAB | Facility: HOSPITAL | Age: 71
End: 2021-09-23
Attending: INTERNAL MEDICINE
Payer: COMMERCIAL

## 2021-09-23 DIAGNOSIS — D68.61 ANTI-PHOSPHOLIPID ANTIBODY SYNDROME: ICD-10-CM

## 2021-09-23 DIAGNOSIS — Z79.01 LONG TERM CURRENT USE OF ANTICOAGULANT THERAPY: ICD-10-CM

## 2021-09-23 LAB
INR PPP: 2.3 (ref 0.8–1.2)
PROTHROMBIN TIME: 23.5 SEC (ref 9–12.5)

## 2021-09-23 PROCEDURE — 36415 COLL VENOUS BLD VENIPUNCTURE: CPT | Mod: PO | Performed by: INTERNAL MEDICINE

## 2021-09-23 PROCEDURE — 85610 PROTHROMBIN TIME: CPT | Performed by: INTERNAL MEDICINE

## 2021-09-24 ENCOUNTER — PATIENT MESSAGE (OUTPATIENT)
Dept: RHEUMATOLOGY | Facility: CLINIC | Age: 71
End: 2021-09-24

## 2021-09-24 ENCOUNTER — ANTI-COAG VISIT (OUTPATIENT)
Dept: CARDIOLOGY | Facility: CLINIC | Age: 71
End: 2021-09-24
Payer: COMMERCIAL

## 2021-09-24 DIAGNOSIS — D68.61 ANTI-PHOSPHOLIPID ANTIBODY SYNDROME: ICD-10-CM

## 2021-09-24 DIAGNOSIS — Z79.01 LONG TERM CURRENT USE OF ANTICOAGULANT THERAPY: Primary | ICD-10-CM

## 2021-09-24 DIAGNOSIS — M17.30 POST-TRAUMATIC OSTEOARTHRITIS OF KNEE, UNSPECIFIED LATERALITY: ICD-10-CM

## 2021-09-24 PROCEDURE — 93793 ANTICOAG MGMT PT WARFARIN: CPT | Mod: S$GLB,,,

## 2021-09-24 PROCEDURE — 93793 PR ANTICOAGULANT MGMT FOR PT TAKING WARFARIN: ICD-10-PCS | Mod: S$GLB,,,

## 2021-09-27 RX ORDER — CELECOXIB 200 MG/1
200 CAPSULE ORAL DAILY PRN
Qty: 20 CAPSULE | Refills: 5 | Status: SHIPPED | OUTPATIENT
Start: 2021-09-27 | End: 2022-03-07

## 2021-10-06 ENCOUNTER — LAB VISIT (OUTPATIENT)
Dept: LAB | Facility: HOSPITAL | Age: 71
End: 2021-10-06
Attending: INTERNAL MEDICINE
Payer: COMMERCIAL

## 2021-10-06 DIAGNOSIS — D68.61 ANTI-PHOSPHOLIPID ANTIBODY SYNDROME: ICD-10-CM

## 2021-10-06 DIAGNOSIS — Z79.01 LONG TERM CURRENT USE OF ANTICOAGULANT THERAPY: ICD-10-CM

## 2021-10-06 PROCEDURE — 85610 PROTHROMBIN TIME: CPT | Performed by: INTERNAL MEDICINE

## 2021-10-06 PROCEDURE — 36415 COLL VENOUS BLD VENIPUNCTURE: CPT | Mod: PO | Performed by: INTERNAL MEDICINE

## 2021-10-07 ENCOUNTER — ANTI-COAG VISIT (OUTPATIENT)
Dept: CARDIOLOGY | Facility: CLINIC | Age: 71
End: 2021-10-07
Payer: COMMERCIAL

## 2021-10-07 DIAGNOSIS — D68.61 ANTI-PHOSPHOLIPID ANTIBODY SYNDROME: Primary | ICD-10-CM

## 2021-10-07 DIAGNOSIS — Z79.01 LONG TERM CURRENT USE OF ANTICOAGULANT THERAPY: ICD-10-CM

## 2021-10-07 LAB
INR PPP: 3.7 (ref 0.8–1.2)
PROTHROMBIN TIME: 37.2 SEC (ref 9–12.5)

## 2021-10-07 PROCEDURE — 93793 ANTICOAG MGMT PT WARFARIN: CPT | Mod: S$GLB,,,

## 2021-10-07 PROCEDURE — 93793 PR ANTICOAGULANT MGMT FOR PT TAKING WARFARIN: ICD-10-PCS | Mod: S$GLB,,,

## 2021-10-20 ENCOUNTER — LAB VISIT (OUTPATIENT)
Dept: LAB | Facility: HOSPITAL | Age: 71
End: 2021-10-20
Attending: INTERNAL MEDICINE
Payer: COMMERCIAL

## 2021-10-20 DIAGNOSIS — Z79.01 LONG TERM CURRENT USE OF ANTICOAGULANT THERAPY: ICD-10-CM

## 2021-10-20 DIAGNOSIS — D68.61 ANTI-PHOSPHOLIPID ANTIBODY SYNDROME: ICD-10-CM

## 2021-10-20 LAB
INR PPP: 3.2 (ref 0.8–1.2)
PROTHROMBIN TIME: 32.9 SEC (ref 9–12.5)

## 2021-10-20 PROCEDURE — 85610 PROTHROMBIN TIME: CPT | Performed by: INTERNAL MEDICINE

## 2021-10-20 PROCEDURE — 36415 COLL VENOUS BLD VENIPUNCTURE: CPT | Mod: PO | Performed by: INTERNAL MEDICINE

## 2021-10-21 ENCOUNTER — ANTI-COAG VISIT (OUTPATIENT)
Dept: CARDIOLOGY | Facility: CLINIC | Age: 71
End: 2021-10-21
Payer: COMMERCIAL

## 2021-10-21 DIAGNOSIS — D68.61 ANTI-PHOSPHOLIPID ANTIBODY SYNDROME: Primary | ICD-10-CM

## 2021-10-21 DIAGNOSIS — Z79.01 LONG TERM CURRENT USE OF ANTICOAGULANT THERAPY: ICD-10-CM

## 2021-10-21 PROCEDURE — 93793 PR ANTICOAGULANT MGMT FOR PT TAKING WARFARIN: ICD-10-PCS | Mod: S$GLB,,,

## 2021-10-21 PROCEDURE — 93793 ANTICOAG MGMT PT WARFARIN: CPT | Mod: S$GLB,,,

## 2021-11-04 ENCOUNTER — LAB VISIT (OUTPATIENT)
Dept: LAB | Facility: HOSPITAL | Age: 71
End: 2021-11-04
Attending: INTERNAL MEDICINE
Payer: COMMERCIAL

## 2021-11-04 DIAGNOSIS — D68.61 ANTI-PHOSPHOLIPID ANTIBODY SYNDROME: ICD-10-CM

## 2021-11-04 DIAGNOSIS — Z79.01 LONG TERM CURRENT USE OF ANTICOAGULANT THERAPY: ICD-10-CM

## 2021-11-04 PROCEDURE — 36415 COLL VENOUS BLD VENIPUNCTURE: CPT | Mod: PO | Performed by: INTERNAL MEDICINE

## 2021-11-04 PROCEDURE — 85610 PROTHROMBIN TIME: CPT | Performed by: INTERNAL MEDICINE

## 2021-11-05 ENCOUNTER — ANTI-COAG VISIT (OUTPATIENT)
Dept: CARDIOLOGY | Facility: CLINIC | Age: 71
End: 2021-11-05
Payer: COMMERCIAL

## 2021-11-05 DIAGNOSIS — D68.61 ANTI-PHOSPHOLIPID ANTIBODY SYNDROME: Primary | ICD-10-CM

## 2021-11-05 DIAGNOSIS — Z79.01 LONG TERM CURRENT USE OF ANTICOAGULANT THERAPY: ICD-10-CM

## 2021-11-05 LAB
INR PPP: 2.5 (ref 0.8–1.2)
PROTHROMBIN TIME: 25.9 SEC (ref 9–12.5)

## 2021-11-05 PROCEDURE — 93793 ANTICOAG MGMT PT WARFARIN: CPT | Mod: S$GLB,,,

## 2021-11-05 PROCEDURE — 93793 PR ANTICOAGULANT MGMT FOR PT TAKING WARFARIN: ICD-10-PCS | Mod: S$GLB,,,

## 2021-11-10 ENCOUNTER — PATIENT MESSAGE (OUTPATIENT)
Dept: FAMILY MEDICINE | Facility: CLINIC | Age: 71
End: 2021-11-10
Payer: COMMERCIAL

## 2021-11-10 DIAGNOSIS — D68.61 ANTI-PHOSPHOLIPID ANTIBODY SYNDROME: ICD-10-CM

## 2021-11-10 DIAGNOSIS — Z00.00 ROUTINE GENERAL MEDICAL EXAMINATION AT HEALTH CARE FACILITY: Primary | ICD-10-CM

## 2021-11-10 DIAGNOSIS — Z12.5 ENCOUNTER FOR SCREENING FOR MALIGNANT NEOPLASM OF PROSTATE: ICD-10-CM

## 2021-11-12 ENCOUNTER — LAB VISIT (OUTPATIENT)
Dept: LAB | Facility: HOSPITAL | Age: 71
End: 2021-11-12
Attending: FAMILY MEDICINE
Payer: COMMERCIAL

## 2021-11-12 DIAGNOSIS — D68.61 ANTI-PHOSPHOLIPID ANTIBODY SYNDROME: ICD-10-CM

## 2021-11-12 DIAGNOSIS — Z00.00 ROUTINE GENERAL MEDICAL EXAMINATION AT HEALTH CARE FACILITY: ICD-10-CM

## 2021-11-12 DIAGNOSIS — Z12.5 ENCOUNTER FOR SCREENING FOR MALIGNANT NEOPLASM OF PROSTATE: ICD-10-CM

## 2021-11-12 LAB
ALBUMIN SERPL BCP-MCNC: 4.2 G/DL (ref 3.5–5.2)
ALP SERPL-CCNC: 62 U/L (ref 55–135)
ALT SERPL W/O P-5'-P-CCNC: 24 U/L (ref 10–44)
ANION GAP SERPL CALC-SCNC: 5 MMOL/L (ref 8–16)
AST SERPL-CCNC: 27 U/L (ref 10–40)
BASOPHILS # BLD AUTO: 0.03 K/UL (ref 0–0.2)
BASOPHILS NFR BLD: 0.6 % (ref 0–1.9)
BILIRUB SERPL-MCNC: 0.4 MG/DL (ref 0.1–1)
BUN SERPL-MCNC: 20 MG/DL (ref 8–23)
CALCIUM SERPL-MCNC: 9.8 MG/DL (ref 8.7–10.5)
CHLORIDE SERPL-SCNC: 104 MMOL/L (ref 95–110)
CHOLEST SERPL-MCNC: 193 MG/DL (ref 120–199)
CHOLEST/HDLC SERPL: 4.4 {RATIO} (ref 2–5)
CO2 SERPL-SCNC: 30 MMOL/L (ref 23–29)
COMPLEXED PSA SERPL-MCNC: 0.67 NG/ML (ref 0–4)
CREAT SERPL-MCNC: 1 MG/DL (ref 0.5–1.4)
DIFFERENTIAL METHOD: ABNORMAL
EOSINOPHIL # BLD AUTO: 0.1 K/UL (ref 0–0.5)
EOSINOPHIL NFR BLD: 2.7 % (ref 0–8)
ERYTHROCYTE [DISTWIDTH] IN BLOOD BY AUTOMATED COUNT: 12.6 % (ref 11.5–14.5)
EST. GFR  (AFRICAN AMERICAN): >60 ML/MIN/1.73 M^2
EST. GFR  (NON AFRICAN AMERICAN): >60 ML/MIN/1.73 M^2
GLUCOSE SERPL-MCNC: 78 MG/DL (ref 70–110)
HCT VFR BLD AUTO: 39 % (ref 40–54)
HDLC SERPL-MCNC: 44 MG/DL (ref 40–75)
HDLC SERPL: 22.8 % (ref 20–50)
HGB BLD-MCNC: 13.3 G/DL (ref 14–18)
IMM GRANULOCYTES # BLD AUTO: 0.02 K/UL (ref 0–0.04)
IMM GRANULOCYTES NFR BLD AUTO: 0.4 % (ref 0–0.5)
LDLC SERPL CALC-MCNC: 130.4 MG/DL (ref 63–159)
LYMPHOCYTES # BLD AUTO: 1.6 K/UL (ref 1–4.8)
LYMPHOCYTES NFR BLD: 33.7 % (ref 18–48)
MCH RBC QN AUTO: 31.5 PG (ref 27–31)
MCHC RBC AUTO-ENTMCNC: 34.1 G/DL (ref 32–36)
MCV RBC AUTO: 92 FL (ref 82–98)
MONOCYTES # BLD AUTO: 0.6 K/UL (ref 0.3–1)
MONOCYTES NFR BLD: 11.4 % (ref 4–15)
NEUTROPHILS # BLD AUTO: 2.5 K/UL (ref 1.8–7.7)
NEUTROPHILS NFR BLD: 51.2 % (ref 38–73)
NONHDLC SERPL-MCNC: 149 MG/DL
NRBC BLD-RTO: 0 /100 WBC
PLATELET # BLD AUTO: 252 K/UL (ref 150–450)
PMV BLD AUTO: 9.4 FL (ref 9.2–12.9)
POTASSIUM SERPL-SCNC: 4.9 MMOL/L (ref 3.5–5.1)
PROT SERPL-MCNC: 7.1 G/DL (ref 6–8.4)
RBC # BLD AUTO: 4.22 M/UL (ref 4.6–6.2)
SODIUM SERPL-SCNC: 139 MMOL/L (ref 136–145)
TRIGL SERPL-MCNC: 93 MG/DL (ref 30–150)
WBC # BLD AUTO: 4.84 K/UL (ref 3.9–12.7)

## 2021-11-12 PROCEDURE — 36415 COLL VENOUS BLD VENIPUNCTURE: CPT | Performed by: FAMILY MEDICINE

## 2021-11-12 PROCEDURE — 80053 COMPREHEN METABOLIC PANEL: CPT | Performed by: FAMILY MEDICINE

## 2021-11-12 PROCEDURE — 85025 COMPLETE CBC W/AUTO DIFF WBC: CPT | Performed by: FAMILY MEDICINE

## 2021-11-12 PROCEDURE — 80061 LIPID PANEL: CPT | Performed by: FAMILY MEDICINE

## 2021-11-12 PROCEDURE — 84153 ASSAY OF PSA TOTAL: CPT | Performed by: FAMILY MEDICINE

## 2021-11-17 ENCOUNTER — OFFICE VISIT (OUTPATIENT)
Dept: FAMILY MEDICINE | Facility: CLINIC | Age: 71
End: 2021-11-17
Attending: FAMILY MEDICINE
Payer: COMMERCIAL

## 2021-11-17 VITALS
TEMPERATURE: 98 F | HEIGHT: 70 IN | SYSTOLIC BLOOD PRESSURE: 110 MMHG | HEART RATE: 91 BPM | DIASTOLIC BLOOD PRESSURE: 70 MMHG | BODY MASS INDEX: 28.18 KG/M2 | WEIGHT: 196.88 LBS

## 2021-11-17 DIAGNOSIS — Z00.00 ROUTINE GENERAL MEDICAL EXAMINATION AT HEALTH CARE FACILITY: Primary | ICD-10-CM

## 2021-11-17 DIAGNOSIS — Z23 IMMUNIZATION DUE: ICD-10-CM

## 2021-11-17 DIAGNOSIS — Z12.11 COLON CANCER SCREENING: ICD-10-CM

## 2021-11-17 DIAGNOSIS — D68.61 ANTI-PHOSPHOLIPID ANTIBODY SYNDROME: ICD-10-CM

## 2021-11-17 DIAGNOSIS — G25.81 RESTLESS LEGS SYNDROME (RLS): ICD-10-CM

## 2021-11-17 PROCEDURE — 90662 IIV NO PRSV INCREASED AG IM: CPT | Mod: S$GLB,,, | Performed by: FAMILY MEDICINE

## 2021-11-17 PROCEDURE — 3078F DIAST BP <80 MM HG: CPT | Mod: CPTII,S$GLB,, | Performed by: FAMILY MEDICINE

## 2021-11-17 PROCEDURE — 1160F PR REVIEW ALL MEDS BY PRESCRIBER/CLIN PHARMACIST DOCUMENTED: ICD-10-PCS | Mod: CPTII,S$GLB,, | Performed by: FAMILY MEDICINE

## 2021-11-17 PROCEDURE — 99397 PER PM REEVAL EST PAT 65+ YR: CPT | Mod: 25,S$GLB,, | Performed by: FAMILY MEDICINE

## 2021-11-17 PROCEDURE — 3008F PR BODY MASS INDEX (BMI) DOCUMENTED: ICD-10-PCS | Mod: CPTII,S$GLB,, | Performed by: FAMILY MEDICINE

## 2021-11-17 PROCEDURE — 90471 FLU VACCINE - QUADRIVALENT - HIGH DOSE (65+) PRESERVATIVE FREE IM: ICD-10-PCS | Mod: S$GLB,,, | Performed by: FAMILY MEDICINE

## 2021-11-17 PROCEDURE — 1159F MED LIST DOCD IN RCRD: CPT | Mod: CPTII,S$GLB,, | Performed by: FAMILY MEDICINE

## 2021-11-17 PROCEDURE — 3074F SYST BP LT 130 MM HG: CPT | Mod: CPTII,S$GLB,, | Performed by: FAMILY MEDICINE

## 2021-11-17 PROCEDURE — 99999 PR PBB SHADOW E&M-EST. PATIENT-LVL III: CPT | Mod: PBBFAC,,, | Performed by: FAMILY MEDICINE

## 2021-11-17 PROCEDURE — 3008F BODY MASS INDEX DOCD: CPT | Mod: CPTII,S$GLB,, | Performed by: FAMILY MEDICINE

## 2021-11-17 PROCEDURE — 99999 PR PBB SHADOW E&M-EST. PATIENT-LVL III: ICD-10-PCS | Mod: PBBFAC,,, | Performed by: FAMILY MEDICINE

## 2021-11-17 PROCEDURE — 90662 FLU VACCINE - QUADRIVALENT - HIGH DOSE (65+) PRESERVATIVE FREE IM: ICD-10-PCS | Mod: S$GLB,,, | Performed by: FAMILY MEDICINE

## 2021-11-17 PROCEDURE — 1160F RVW MEDS BY RX/DR IN RCRD: CPT | Mod: CPTII,S$GLB,, | Performed by: FAMILY MEDICINE

## 2021-11-17 PROCEDURE — 90471 IMMUNIZATION ADMIN: CPT | Mod: S$GLB,,, | Performed by: FAMILY MEDICINE

## 2021-11-17 PROCEDURE — 3074F PR MOST RECENT SYSTOLIC BLOOD PRESSURE < 130 MM HG: ICD-10-PCS | Mod: CPTII,S$GLB,, | Performed by: FAMILY MEDICINE

## 2021-11-17 PROCEDURE — 1159F PR MEDICATION LIST DOCUMENTED IN MEDICAL RECORD: ICD-10-PCS | Mod: CPTII,S$GLB,, | Performed by: FAMILY MEDICINE

## 2021-11-17 PROCEDURE — 99397 PR PREVENTIVE VISIT,EST,65 & OVER: ICD-10-PCS | Mod: 25,S$GLB,, | Performed by: FAMILY MEDICINE

## 2021-11-17 PROCEDURE — 3078F PR MOST RECENT DIASTOLIC BLOOD PRESSURE < 80 MM HG: ICD-10-PCS | Mod: CPTII,S$GLB,, | Performed by: FAMILY MEDICINE

## 2021-12-02 ENCOUNTER — LAB VISIT (OUTPATIENT)
Dept: LAB | Facility: HOSPITAL | Age: 71
End: 2021-12-02
Attending: INTERNAL MEDICINE
Payer: COMMERCIAL

## 2021-12-02 DIAGNOSIS — D68.61 ANTI-PHOSPHOLIPID ANTIBODY SYNDROME: ICD-10-CM

## 2021-12-02 DIAGNOSIS — Z79.01 LONG TERM CURRENT USE OF ANTICOAGULANT THERAPY: ICD-10-CM

## 2021-12-02 LAB
INR PPP: 3 (ref 0.8–1.2)
PROTHROMBIN TIME: 31.1 SEC (ref 9–12.5)

## 2021-12-02 PROCEDURE — 85610 PROTHROMBIN TIME: CPT | Performed by: INTERNAL MEDICINE

## 2021-12-02 PROCEDURE — 36415 COLL VENOUS BLD VENIPUNCTURE: CPT | Mod: PO | Performed by: INTERNAL MEDICINE

## 2021-12-03 ENCOUNTER — ANTI-COAG VISIT (OUTPATIENT)
Dept: CARDIOLOGY | Facility: CLINIC | Age: 71
End: 2021-12-03
Payer: COMMERCIAL

## 2021-12-03 DIAGNOSIS — Z79.01 LONG TERM CURRENT USE OF ANTICOAGULANT THERAPY: ICD-10-CM

## 2021-12-03 DIAGNOSIS — D68.61 ANTI-PHOSPHOLIPID ANTIBODY SYNDROME: Primary | ICD-10-CM

## 2021-12-03 PROCEDURE — 93793 PR ANTICOAGULANT MGMT FOR PT TAKING WARFARIN: ICD-10-PCS | Mod: S$GLB,,,

## 2021-12-03 PROCEDURE — 93793 ANTICOAG MGMT PT WARFARIN: CPT | Mod: S$GLB,,,

## 2022-01-06 ENCOUNTER — LAB VISIT (OUTPATIENT)
Dept: LAB | Facility: HOSPITAL | Age: 72
End: 2022-01-06
Attending: INTERNAL MEDICINE
Payer: COMMERCIAL

## 2022-01-06 DIAGNOSIS — Z79.01 LONG TERM CURRENT USE OF ANTICOAGULANT THERAPY: ICD-10-CM

## 2022-01-06 DIAGNOSIS — D68.61 ANTI-PHOSPHOLIPID ANTIBODY SYNDROME: ICD-10-CM

## 2022-01-06 PROCEDURE — 36415 COLL VENOUS BLD VENIPUNCTURE: CPT | Mod: PO | Performed by: INTERNAL MEDICINE

## 2022-01-06 PROCEDURE — 85610 PROTHROMBIN TIME: CPT | Performed by: INTERNAL MEDICINE

## 2022-01-07 ENCOUNTER — ANTI-COAG VISIT (OUTPATIENT)
Dept: CARDIOLOGY | Facility: CLINIC | Age: 72
End: 2022-01-07
Payer: COMMERCIAL

## 2022-01-07 DIAGNOSIS — D68.61 ANTI-PHOSPHOLIPID ANTIBODY SYNDROME: Primary | ICD-10-CM

## 2022-01-07 DIAGNOSIS — Z79.01 LONG TERM CURRENT USE OF ANTICOAGULANT THERAPY: ICD-10-CM

## 2022-01-07 LAB
INR PPP: 3.1 (ref 0.8–1.2)
PROTHROMBIN TIME: 31.4 SEC (ref 9–12.5)

## 2022-01-07 PROCEDURE — 93793 PR ANTICOAGULANT MGMT FOR PT TAKING WARFARIN: ICD-10-PCS | Mod: S$GLB,,,

## 2022-01-07 PROCEDURE — 93793 ANTICOAG MGMT PT WARFARIN: CPT | Mod: S$GLB,,,

## 2022-01-19 DIAGNOSIS — Z79.01 LONG TERM CURRENT USE OF ANTICOAGULANT THERAPY: ICD-10-CM

## 2022-01-19 DIAGNOSIS — D68.61 ANTI-PHOSPHOLIPID ANTIBODY SYNDROME: ICD-10-CM

## 2022-01-19 DIAGNOSIS — M32.9 LUPUS (SYSTEMIC LUPUS ERYTHEMATOSUS): ICD-10-CM

## 2022-01-19 RX ORDER — WARFARIN SODIUM 5 MG/1
TABLET ORAL
Qty: 195 TABLET | Refills: 0 | Status: SHIPPED | OUTPATIENT
Start: 2022-01-19 | End: 2022-04-12

## 2022-02-10 ENCOUNTER — LAB VISIT (OUTPATIENT)
Dept: LAB | Facility: HOSPITAL | Age: 72
End: 2022-02-10
Attending: INTERNAL MEDICINE
Payer: COMMERCIAL

## 2022-02-10 DIAGNOSIS — Z79.01 LONG TERM CURRENT USE OF ANTICOAGULANT THERAPY: ICD-10-CM

## 2022-02-10 DIAGNOSIS — D68.61 ANTI-PHOSPHOLIPID ANTIBODY SYNDROME: ICD-10-CM

## 2022-02-10 LAB
INR PPP: 2.8 (ref 0.8–1.2)
PROTHROMBIN TIME: 27.6 SEC (ref 9–12.5)

## 2022-02-10 PROCEDURE — 85610 PROTHROMBIN TIME: CPT | Performed by: INTERNAL MEDICINE

## 2022-02-10 PROCEDURE — 36415 COLL VENOUS BLD VENIPUNCTURE: CPT | Mod: PO | Performed by: INTERNAL MEDICINE

## 2022-02-11 ENCOUNTER — ANTI-COAG VISIT (OUTPATIENT)
Dept: CARDIOLOGY | Facility: CLINIC | Age: 72
End: 2022-02-11
Payer: COMMERCIAL

## 2022-02-11 DIAGNOSIS — Z79.01 LONG TERM CURRENT USE OF ANTICOAGULANT THERAPY: ICD-10-CM

## 2022-02-11 DIAGNOSIS — D68.61 ANTI-PHOSPHOLIPID ANTIBODY SYNDROME: Primary | ICD-10-CM

## 2022-02-11 PROCEDURE — 93793 ANTICOAG MGMT PT WARFARIN: CPT | Mod: S$GLB,,,

## 2022-02-11 PROCEDURE — 93793 PR ANTICOAGULANT MGMT FOR PT TAKING WARFARIN: ICD-10-PCS | Mod: S$GLB,,,

## 2022-03-17 ENCOUNTER — LAB VISIT (OUTPATIENT)
Dept: LAB | Facility: HOSPITAL | Age: 72
End: 2022-03-17
Attending: INTERNAL MEDICINE
Payer: COMMERCIAL

## 2022-03-17 DIAGNOSIS — Z79.01 LONG TERM CURRENT USE OF ANTICOAGULANT THERAPY: ICD-10-CM

## 2022-03-17 DIAGNOSIS — D68.61 ANTI-PHOSPHOLIPID ANTIBODY SYNDROME: ICD-10-CM

## 2022-03-17 LAB
INR PPP: 2.8 (ref 0.8–1.2)
PROTHROMBIN TIME: 27.4 SEC (ref 9–12.5)

## 2022-03-17 PROCEDURE — 85610 PROTHROMBIN TIME: CPT | Performed by: INTERNAL MEDICINE

## 2022-03-17 PROCEDURE — 36415 COLL VENOUS BLD VENIPUNCTURE: CPT | Mod: PO | Performed by: INTERNAL MEDICINE

## 2022-03-18 ENCOUNTER — ANTI-COAG VISIT (OUTPATIENT)
Dept: CARDIOLOGY | Facility: CLINIC | Age: 72
End: 2022-03-18
Payer: COMMERCIAL

## 2022-03-18 DIAGNOSIS — D68.61 ANTI-PHOSPHOLIPID ANTIBODY SYNDROME: Primary | ICD-10-CM

## 2022-03-18 DIAGNOSIS — Z79.01 LONG TERM CURRENT USE OF ANTICOAGULANT THERAPY: ICD-10-CM

## 2022-03-18 PROCEDURE — 93793 PR ANTICOAGULANT MGMT FOR PT TAKING WARFARIN: ICD-10-PCS | Mod: S$GLB,,,

## 2022-03-18 PROCEDURE — 93793 ANTICOAG MGMT PT WARFARIN: CPT | Mod: S$GLB,,,

## 2022-04-28 ENCOUNTER — LAB VISIT (OUTPATIENT)
Dept: LAB | Facility: HOSPITAL | Age: 72
End: 2022-04-28
Attending: INTERNAL MEDICINE
Payer: COMMERCIAL

## 2022-04-28 DIAGNOSIS — Z79.01 LONG TERM CURRENT USE OF ANTICOAGULANT THERAPY: ICD-10-CM

## 2022-04-28 DIAGNOSIS — D68.61 ANTI-PHOSPHOLIPID ANTIBODY SYNDROME: ICD-10-CM

## 2022-04-28 LAB
INR PPP: 3 (ref 0.8–1.2)
PROTHROMBIN TIME: 29.3 SEC (ref 9–12.5)

## 2022-04-28 PROCEDURE — 36415 COLL VENOUS BLD VENIPUNCTURE: CPT | Mod: PO | Performed by: INTERNAL MEDICINE

## 2022-04-28 PROCEDURE — 85610 PROTHROMBIN TIME: CPT | Performed by: INTERNAL MEDICINE

## 2022-04-29 ENCOUNTER — ANTI-COAG VISIT (OUTPATIENT)
Dept: CARDIOLOGY | Facility: CLINIC | Age: 72
End: 2022-04-29
Payer: COMMERCIAL

## 2022-04-29 DIAGNOSIS — D68.61 ANTI-PHOSPHOLIPID ANTIBODY SYNDROME: Primary | ICD-10-CM

## 2022-04-29 DIAGNOSIS — Z79.01 LONG TERM CURRENT USE OF ANTICOAGULANT THERAPY: ICD-10-CM

## 2022-04-29 PROCEDURE — 93793 ANTICOAG MGMT PT WARFARIN: CPT | Mod: S$GLB,,,

## 2022-04-29 PROCEDURE — 93793 PR ANTICOAGULANT MGMT FOR PT TAKING WARFARIN: ICD-10-PCS | Mod: S$GLB,,,

## 2022-06-09 ENCOUNTER — LAB VISIT (OUTPATIENT)
Dept: LAB | Facility: HOSPITAL | Age: 72
End: 2022-06-09
Attending: INTERNAL MEDICINE
Payer: COMMERCIAL

## 2022-06-09 DIAGNOSIS — Z79.01 LONG TERM CURRENT USE OF ANTICOAGULANT THERAPY: ICD-10-CM

## 2022-06-09 DIAGNOSIS — D68.61 ANTI-PHOSPHOLIPID ANTIBODY SYNDROME: ICD-10-CM

## 2022-06-09 LAB
INR PPP: 3.6 (ref 0.8–1.2)
PROTHROMBIN TIME: 34.9 SEC (ref 9–12.5)

## 2022-06-09 PROCEDURE — 85610 PROTHROMBIN TIME: CPT | Performed by: INTERNAL MEDICINE

## 2022-06-09 PROCEDURE — 36415 COLL VENOUS BLD VENIPUNCTURE: CPT | Mod: PO | Performed by: INTERNAL MEDICINE

## 2022-06-10 ENCOUNTER — ANTI-COAG VISIT (OUTPATIENT)
Dept: CARDIOLOGY | Facility: CLINIC | Age: 72
End: 2022-06-10
Payer: COMMERCIAL

## 2022-06-10 DIAGNOSIS — D68.61 ANTI-PHOSPHOLIPID ANTIBODY SYNDROME: Primary | ICD-10-CM

## 2022-06-10 DIAGNOSIS — Z79.01 LONG TERM CURRENT USE OF ANTICOAGULANT THERAPY: ICD-10-CM

## 2022-06-10 PROCEDURE — 93793 ANTICOAG MGMT PT WARFARIN: CPT | Mod: S$GLB,,,

## 2022-06-10 PROCEDURE — 93793 PR ANTICOAGULANT MGMT FOR PT TAKING WARFARIN: ICD-10-PCS | Mod: S$GLB,,,

## 2022-06-23 ENCOUNTER — LAB VISIT (OUTPATIENT)
Dept: LAB | Facility: HOSPITAL | Age: 72
End: 2022-06-23
Attending: INTERNAL MEDICINE
Payer: COMMERCIAL

## 2022-06-23 DIAGNOSIS — D68.61 ANTI-PHOSPHOLIPID ANTIBODY SYNDROME: ICD-10-CM

## 2022-06-23 DIAGNOSIS — Z79.01 LONG TERM CURRENT USE OF ANTICOAGULANT THERAPY: ICD-10-CM

## 2022-06-23 LAB
INR PPP: 2.8 (ref 0.8–1.2)
PROTHROMBIN TIME: 27 SEC (ref 9–12.5)

## 2022-06-23 PROCEDURE — 36415 COLL VENOUS BLD VENIPUNCTURE: CPT | Mod: PO | Performed by: INTERNAL MEDICINE

## 2022-06-23 PROCEDURE — 85610 PROTHROMBIN TIME: CPT | Performed by: INTERNAL MEDICINE

## 2022-06-24 ENCOUNTER — ANTI-COAG VISIT (OUTPATIENT)
Dept: CARDIOLOGY | Facility: CLINIC | Age: 72
End: 2022-06-24
Payer: COMMERCIAL

## 2022-06-24 DIAGNOSIS — Z79.01 LONG TERM CURRENT USE OF ANTICOAGULANT THERAPY: ICD-10-CM

## 2022-06-24 DIAGNOSIS — D68.61 ANTI-PHOSPHOLIPID ANTIBODY SYNDROME: Primary | ICD-10-CM

## 2022-06-24 PROCEDURE — 93793 ANTICOAG MGMT PT WARFARIN: CPT | Mod: S$GLB,,,

## 2022-06-24 PROCEDURE — 93793 PR ANTICOAGULANT MGMT FOR PT TAKING WARFARIN: ICD-10-PCS | Mod: S$GLB,,,

## 2022-07-05 ENCOUNTER — OFFICE VISIT (OUTPATIENT)
Dept: FAMILY MEDICINE | Facility: CLINIC | Age: 72
End: 2022-07-05
Attending: FAMILY MEDICINE
Payer: COMMERCIAL

## 2022-07-05 ENCOUNTER — PATIENT MESSAGE (OUTPATIENT)
Dept: FAMILY MEDICINE | Facility: CLINIC | Age: 72
End: 2022-07-05

## 2022-07-05 VITALS
SYSTOLIC BLOOD PRESSURE: 118 MMHG | WEIGHT: 196 LBS | OXYGEN SATURATION: 96 % | BODY MASS INDEX: 28.06 KG/M2 | HEART RATE: 76 BPM | DIASTOLIC BLOOD PRESSURE: 66 MMHG | HEIGHT: 70 IN

## 2022-07-05 DIAGNOSIS — G25.81 RESTLESS LEGS SYNDROME (RLS): ICD-10-CM

## 2022-07-05 DIAGNOSIS — D68.61 ANTI-PHOSPHOLIPID ANTIBODY SYNDROME: ICD-10-CM

## 2022-07-05 DIAGNOSIS — M25.472 LEFT ANKLE SWELLING: Primary | ICD-10-CM

## 2022-07-05 DIAGNOSIS — M79.601 RIGHT ARM PAIN: ICD-10-CM

## 2022-07-05 DIAGNOSIS — M25.562 ACUTE PAIN OF LEFT KNEE: ICD-10-CM

## 2022-07-05 DIAGNOSIS — M32.19 OTHER SYSTEMIC LUPUS ERYTHEMATOSUS WITH OTHER ORGAN INVOLVEMENT: ICD-10-CM

## 2022-07-05 DIAGNOSIS — I45.9 SKIPPED HEART BEATS: ICD-10-CM

## 2022-07-05 PROCEDURE — 3008F PR BODY MASS INDEX (BMI) DOCUMENTED: ICD-10-PCS | Mod: CPTII,S$GLB,, | Performed by: FAMILY MEDICINE

## 2022-07-05 PROCEDURE — 3078F DIAST BP <80 MM HG: CPT | Mod: CPTII,S$GLB,, | Performed by: FAMILY MEDICINE

## 2022-07-05 PROCEDURE — 1101F PT FALLS ASSESS-DOCD LE1/YR: CPT | Mod: CPTII,S$GLB,, | Performed by: FAMILY MEDICINE

## 2022-07-05 PROCEDURE — 1159F PR MEDICATION LIST DOCUMENTED IN MEDICAL RECORD: ICD-10-PCS | Mod: CPTII,S$GLB,, | Performed by: FAMILY MEDICINE

## 2022-07-05 PROCEDURE — 99499 UNLISTED E&M SERVICE: CPT | Mod: S$GLB,,, | Performed by: FAMILY MEDICINE

## 2022-07-05 PROCEDURE — 3288F FALL RISK ASSESSMENT DOCD: CPT | Mod: CPTII,S$GLB,, | Performed by: FAMILY MEDICINE

## 2022-07-05 PROCEDURE — 1160F RVW MEDS BY RX/DR IN RCRD: CPT | Mod: CPTII,S$GLB,, | Performed by: FAMILY MEDICINE

## 2022-07-05 PROCEDURE — 3288F PR FALLS RISK ASSESSMENT DOCUMENTED: ICD-10-PCS | Mod: CPTII,S$GLB,, | Performed by: FAMILY MEDICINE

## 2022-07-05 PROCEDURE — 3074F SYST BP LT 130 MM HG: CPT | Mod: CPTII,S$GLB,, | Performed by: FAMILY MEDICINE

## 2022-07-05 PROCEDURE — 99499 RISK ADDL DX/OHS AUDIT: ICD-10-PCS | Mod: S$GLB,,, | Performed by: FAMILY MEDICINE

## 2022-07-05 PROCEDURE — 3074F PR MOST RECENT SYSTOLIC BLOOD PRESSURE < 130 MM HG: ICD-10-PCS | Mod: CPTII,S$GLB,, | Performed by: FAMILY MEDICINE

## 2022-07-05 PROCEDURE — 1160F PR REVIEW ALL MEDS BY PRESCRIBER/CLIN PHARMACIST DOCUMENTED: ICD-10-PCS | Mod: CPTII,S$GLB,, | Performed by: FAMILY MEDICINE

## 2022-07-05 PROCEDURE — 99214 OFFICE O/P EST MOD 30 MIN: CPT | Mod: S$GLB,,, | Performed by: FAMILY MEDICINE

## 2022-07-05 PROCEDURE — 99999 PR PBB SHADOW E&M-EST. PATIENT-LVL III: CPT | Mod: PBBFAC,,, | Performed by: FAMILY MEDICINE

## 2022-07-05 PROCEDURE — 1126F AMNT PAIN NOTED NONE PRSNT: CPT | Mod: CPTII,S$GLB,, | Performed by: FAMILY MEDICINE

## 2022-07-05 PROCEDURE — 3078F PR MOST RECENT DIASTOLIC BLOOD PRESSURE < 80 MM HG: ICD-10-PCS | Mod: CPTII,S$GLB,, | Performed by: FAMILY MEDICINE

## 2022-07-05 PROCEDURE — 1159F MED LIST DOCD IN RCRD: CPT | Mod: CPTII,S$GLB,, | Performed by: FAMILY MEDICINE

## 2022-07-05 PROCEDURE — 99214 PR OFFICE/OUTPT VISIT, EST, LEVL IV, 30-39 MIN: ICD-10-PCS | Mod: S$GLB,,, | Performed by: FAMILY MEDICINE

## 2022-07-05 PROCEDURE — 3008F BODY MASS INDEX DOCD: CPT | Mod: CPTII,S$GLB,, | Performed by: FAMILY MEDICINE

## 2022-07-05 PROCEDURE — 99999 PR PBB SHADOW E&M-EST. PATIENT-LVL III: ICD-10-PCS | Mod: PBBFAC,,, | Performed by: FAMILY MEDICINE

## 2022-07-05 PROCEDURE — 1126F PR PAIN SEVERITY QUANTIFIED, NO PAIN PRESENT: ICD-10-PCS | Mod: CPTII,S$GLB,, | Performed by: FAMILY MEDICINE

## 2022-07-05 PROCEDURE — 1101F PR PT FALLS ASSESS DOC 0-1 FALLS W/OUT INJ PAST YR: ICD-10-PCS | Mod: CPTII,S$GLB,, | Performed by: FAMILY MEDICINE

## 2022-07-05 RX ORDER — METHOCARBAMOL 500 MG/1
500 TABLET, FILM COATED ORAL 3 TIMES DAILY
Qty: 30 TABLET | Refills: 0 | Status: SHIPPED | OUTPATIENT
Start: 2022-07-05 | End: 2022-07-15

## 2022-07-05 NOTE — PROGRESS NOTES
Subjective:       Patient ID: Viraj Olsen is a 71 y.o. male.    Chief Complaint: Arm Pain (Right Arm) and Leg Swelling (Left Leg)    71 yr old pleasant white male with antiphospholipid antibody syndrome, SLE, RLS, and no other significant chronic medical history presents today for EVALUATION OF SKIPPED BEATS AND PAIN IN LEFT LEG N KNEE AND RIGHT ARM PAIN.    APAS/SLE - diagnosed about 20-21 years ago - on coumadin and plaquenil since then - had multiple TIA prior to being diagnosed with APAS 21 years ago - no issues - follows Rheumatology      RLS - controlled       History as below - reviewed in detail        -labs due  -PSA due          Arm Pain   Pertinent negatives include no chest pain.     Review of Systems   Constitutional: Negative.  Negative for activity change, diaphoresis and unexpected weight change.   HENT: Negative.  Negative for nasal congestion, ear pain, mouth sores, rhinorrhea and voice change.    Eyes: Negative.  Negative for pain, discharge and visual disturbance.   Respiratory: Negative.  Negative for apnea, cough and wheezing.    Cardiovascular: Negative.  Negative for chest pain and palpitations.   Gastrointestinal: Negative.  Negative for abdominal distention, anal bleeding, diarrhea and vomiting.   Endocrine: Negative.  Negative for cold intolerance and polyuria.   Genitourinary: Negative.  Negative for decreased urine volume, difficulty urinating, discharge, frequency and scrotal swelling.   Musculoskeletal: Positive for arthralgias and myalgias. Negative for back pain and neck stiffness.   Integumentary:  Negative for color change and rash. Negative.   Allergic/Immunologic: Negative.  Negative for environmental allergies.   Neurological: Negative.  Negative for dizziness, speech difficulty, weakness and light-headedness.   Hematological: Negative.    Psychiatric/Behavioral: Negative.  Negative for agitation, dysphoric mood and suicidal ideas. The patient is not nervous/anxious.           Active Ambulatory Problems     Diagnosis Date Noted    Other forms of systemic lupus erythematosus 06/21/2012    Anti-phospholipid antibody syndrome 06/21/2012    Long term current use of anticoagulant therapy 08/02/2012    High risk medication use 05/24/2013    Restless legs syndrome (RLS) 06/07/2013    Livedo reticularis 06/07/2013    Lymphopenia 10/13/2016    Screening for colon cancer 12/28/2016    Post-traumatic osteoarthritis of knee 04/01/2020     Resolved Ambulatory Problems     Diagnosis Date Noted    Impending cerebrovascular accident 06/21/2012    Leukopenia 06/07/2013     Past Medical History:   Diagnosis Date    Cataract     Lupus      Past Surgical History:   Procedure Laterality Date    abdondectomy      APPENDECTOMY      COLONOSCOPY N/A 12/28/2016    Procedure: COLONOSCOPY;  Surgeon: Hillary García MD;  Location: [facility];  Service: Endoscopy;  Laterality: N/A;    TONSILLECTOMY       Family History   Problem Relation Age of Onset    Hypertension Father     Heart disease Father     Lupus Mother     No Known Problems Sister     No Known Problems Brother     No Known Problems Maternal Aunt     No Known Problems Maternal Uncle     No Known Problems Paternal Aunt     No Known Problems Paternal Uncle     No Known Problems Maternal Grandmother     No Known Problems Maternal Grandfather     No Known Problems Paternal Grandmother     No Known Problems Paternal Grandfather     Amblyopia Neg Hx     Blindness Neg Hx     Cancer Neg Hx     Cataracts Neg Hx     Diabetes Neg Hx     Glaucoma Neg Hx     Macular degeneration Neg Hx     Retinal detachment Neg Hx     Strabismus Neg Hx     Stroke Neg Hx     Thyroid disease Neg Hx      Social History     Socioeconomic History    Marital status:    Tobacco Use    Smoking status: Never Smoker    Smokeless tobacco: Never Used   Substance and Sexual Activity    Alcohol use: Yes     Comment: social    Drug use: No     Sexual activity: Yes     Partners: Female     Review of patient's allergies indicates:  No Known Allergies  Current Outpatient Medications on File Prior to Visit   Medication Sig Dispense Refill    aspirin (ECOTRIN) 81 MG EC tablet Take 81 mg by mouth once daily.      b complex vitamins capsule Take 1 capsule by mouth once daily.      calcium carbonate 220 mg capsule Take 250 mg by mouth 2 (two) times daily with meals.      CALCIUM CARBONATE/VITAMIN D3 (VITAMIN D-3 ORAL) Take 1 capsule by mouth once daily.      celecoxib (CELEBREX) 200 MG capsule TAKE 1 CAPSULE (200 MG TOTAL) BY MOUTH DAILY AS NEEDED FOR PAIN. 20 capsule 5    ferrous sulfate 325 (65 FE) MG EC tablet Take 325 mg by mouth once daily.      fish oil-omega-3 fatty acids 300-1,000 mg capsule Take 2 g by mouth once daily.      FLUZONE HIGH-DOSE 2018-19, PF, 180 mcg/0.5 mL vaccine ADM 0.5ML IM UTD  0    hydrOXYchloroQUINE (PLAQUENIL) 200 mg tablet TAKE 1 TABLET TWICE A  tablet 1    melatonin 3 mg Tab       multivitamin (THERAGRAN) per tablet Take 1 tablet by mouth once daily.      mupirocin (BACTROBAN) 2 % ointment Apply to affected area 3 times daily 22 g 1    TRANSDERM-SCOP 1 mg over 3 days UNW AND PHU UTD  1    warfarin (JANTOVEN) 5 MG tablet TAKE 2 AND 1/2 TABLETS DAILY EXCEPT TAKE 2 TABLETS ON TUESDAY, THURSDAY AND SUNDAY OR AS DIRECTED BY COUMADIN CLINIC. 195 tablet 0    albuterol (PROVENTIL/VENTOLIN HFA) 90 mcg/actuation inhaler Inhale 2 puffs into the lungs every 6 (six) hours as needed for Wheezing or Shortness of Breath. Rescue 8 g 0     No current facility-administered medications on file prior to visit.       Objective:       Vitals:    07/05/22 0842   BP: 118/66   Pulse: 76       Physical Exam  Constitutional:       Appearance: He is well-developed.   HENT:      Head: Normocephalic and atraumatic.      Right Ear: External ear normal.      Left Ear: External ear normal.      Nose: Nose normal.      Mouth/Throat:       Pharynx: No oropharyngeal exudate.   Eyes:      General: No scleral icterus.        Right eye: No discharge.         Left eye: No discharge.      Conjunctiva/sclera: Conjunctivae normal.      Pupils: Pupils are equal, round, and reactive to light.   Neck:      Thyroid: No thyromegaly.      Vascular: No JVD.      Trachea: No tracheal deviation.   Cardiovascular:      Rate and Rhythm: Normal rate and regular rhythm.      Heart sounds: Normal heart sounds. No murmur heard.    No friction rub. No gallop.      Comments: SKIPPED BEATS+  Pulmonary:      Effort: Pulmonary effort is normal. No respiratory distress.      Breath sounds: Normal breath sounds. No stridor. No wheezing or rales.   Chest:      Chest wall: No tenderness.   Abdominal:      General: Bowel sounds are normal. There is no distension.      Palpations: Abdomen is soft. There is no mass.      Tenderness: There is no abdominal tenderness. There is no guarding or rebound.      Hernia: No hernia is present.   Musculoskeletal:         General: Tenderness (TTP LEFT LEG AND KNEE PAIN+) present. Normal range of motion.      Cervical back: Normal range of motion and neck supple.      Comments: TTP RIGHT ARM  +   Lymphadenopathy:      Cervical: No cervical adenopathy.   Skin:     General: Skin is warm and dry.      Coloration: Skin is not pale.      Findings: No erythema or rash.   Neurological:      Mental Status: He is alert and oriented to person, place, and time.      Cranial Nerves: No cranial nerve deficit.      Motor: No abnormal muscle tone.      Coordination: Coordination normal.      Deep Tendon Reflexes: Reflexes are normal and symmetric. Reflexes normal.   Psychiatric:         Behavior: Behavior normal.         Thought Content: Thought content normal.         Judgment: Judgment normal.         Assessment:       Problem List Items Addressed This Visit     Restless legs syndrome (RLS) (Chronic)    Anti-phospholipid antibody syndrome (Chronic)    Other  forms of systemic lupus erythematosus      Other Visit Diagnoses     Left ankle swelling    -  Primary    Relevant Medications    methocarbamoL (ROBAXIN) 500 MG Tab    Acute pain of left knee        Relevant Medications    methocarbamoL (ROBAXIN) 500 MG Tab    Right arm pain        Relevant Medications    methocarbamoL (ROBAXIN) 500 MG Tab    Skipped heart beats        Relevant Orders    TSH    SCHEDULED EKG 12-LEAD (to Muse)    Holter monitor - 48 hour          Plan:           Viraj was seen today for arm pain and leg swelling.    Diagnoses and all orders for this visit:    Left ankle swelling  -     methocarbamoL (ROBAXIN) 500 MG Tab; Take 1 tablet (500 mg total) by mouth 3 (three) times daily. for 10 days    Acute pain of left knee  -     methocarbamoL (ROBAXIN) 500 MG Tab; Take 1 tablet (500 mg total) by mouth 3 (three) times daily. for 10 days    Right arm pain  -     methocarbamoL (ROBAXIN) 500 MG Tab; Take 1 tablet (500 mg total) by mouth 3 (three) times daily. for 10 days    Skipped heart beats  -     TSH; Future  -     SCHEDULED EKG 12-LEAD (to Muse); Future  -     Holter monitor - 48 hour; Future    Other systemic lupus erythematosus with other organ involvement    Anti-phospholipid antibody syndrome    Restless legs syndrome (RLS)      LEFT ANKLE SWELLING/PAIN/LEFT KNEE PAIN  -REST, ICE AND MUSCLE RELAXANT PRN  -CELEBREX PRN    SKIPPED HEART BEATS  -LAB  -EKG N HOLTER    Spent adequate time in obtaining history and explaining differentials    25 minutes spent during this visit of which greater than 50% devoted to face-face counseling and coordination of care regarding diagnosis and management plan    Follow up in about 4 months (around 11/5/2022), or if symptoms worsen or fail to improve.

## 2022-07-08 ENCOUNTER — CLINICAL SUPPORT (OUTPATIENT)
Dept: LAB | Facility: HOSPITAL | Age: 72
End: 2022-07-08
Attending: FAMILY MEDICINE
Payer: COMMERCIAL

## 2022-07-08 ENCOUNTER — HOSPITAL ENCOUNTER (OUTPATIENT)
Dept: CARDIOLOGY | Facility: HOSPITAL | Age: 72
Discharge: HOME | End: 2022-07-08
Attending: FAMILY MEDICINE
Payer: COMMERCIAL

## 2022-07-08 DIAGNOSIS — I45.9 SKIPPED HEART BEATS: ICD-10-CM

## 2022-07-08 PROCEDURE — 93227 XTRNL ECG REC<48 HR R&I: CPT | Mod: ,,, | Performed by: INTERNAL MEDICINE

## 2022-07-08 PROCEDURE — 93226 XTRNL ECG REC<48 HR SCAN A/R: CPT

## 2022-07-08 PROCEDURE — 93227 HOLTER MONITOR - 48 HOUR (CUPID ONLY): ICD-10-PCS | Mod: ,,, | Performed by: INTERNAL MEDICINE

## 2022-07-11 LAB
OHS CV EVENT MONITOR DAY: 0
OHS CV HOLTER LENGTH DECIMAL HOURS: 48
OHS CV HOLTER LENGTH HOURS: 48
OHS CV HOLTER LENGTH MINUTES: 0
OHS CV HOLTER SINUS AVERAGE HR: 74
OHS CV HOLTER SINUS MAX HR: 143
OHS CV HOLTER SINUS MIN HR: 49

## 2022-07-12 ENCOUNTER — PATIENT MESSAGE (OUTPATIENT)
Dept: FAMILY MEDICINE | Facility: CLINIC | Age: 72
End: 2022-07-12
Payer: COMMERCIAL

## 2022-07-12 DIAGNOSIS — I49.1 PAC (PREMATURE ATRIAL CONTRACTION): Primary | ICD-10-CM

## 2022-07-12 DIAGNOSIS — I49.3 PVC (PREMATURE VENTRICULAR CONTRACTION): ICD-10-CM

## 2022-07-13 ENCOUNTER — TELEPHONE (OUTPATIENT)
Dept: FAMILY MEDICINE | Facility: CLINIC | Age: 72
End: 2022-07-13
Payer: COMMERCIAL

## 2022-07-21 ENCOUNTER — LAB VISIT (OUTPATIENT)
Dept: LAB | Facility: HOSPITAL | Age: 72
End: 2022-07-21
Attending: INTERNAL MEDICINE
Payer: COMMERCIAL

## 2022-07-21 DIAGNOSIS — Z79.01 LONG TERM CURRENT USE OF ANTICOAGULANT THERAPY: ICD-10-CM

## 2022-07-21 DIAGNOSIS — D68.61 ANTI-PHOSPHOLIPID ANTIBODY SYNDROME: ICD-10-CM

## 2022-07-21 LAB
INR PPP: 3.3 (ref 0.8–1.2)
PROTHROMBIN TIME: 32 SEC (ref 9–12.5)

## 2022-07-21 PROCEDURE — 36415 COLL VENOUS BLD VENIPUNCTURE: CPT | Mod: PO | Performed by: INTERNAL MEDICINE

## 2022-07-21 PROCEDURE — 85610 PROTHROMBIN TIME: CPT | Performed by: INTERNAL MEDICINE

## 2022-07-22 ENCOUNTER — ANTI-COAG VISIT (OUTPATIENT)
Dept: CARDIOLOGY | Facility: CLINIC | Age: 72
End: 2022-07-22
Payer: COMMERCIAL

## 2022-07-22 DIAGNOSIS — D68.61 ANTI-PHOSPHOLIPID ANTIBODY SYNDROME: Primary | ICD-10-CM

## 2022-07-22 DIAGNOSIS — Z79.01 LONG TERM CURRENT USE OF ANTICOAGULANT THERAPY: ICD-10-CM

## 2022-07-22 PROCEDURE — 93793 PR ANTICOAGULANT MGMT FOR PT TAKING WARFARIN: ICD-10-PCS | Mod: S$GLB,,,

## 2022-07-22 PROCEDURE — 93793 ANTICOAG MGMT PT WARFARIN: CPT | Mod: S$GLB,,,

## 2022-08-08 PROBLEM — I49.3 PVCS (PREMATURE VENTRICULAR CONTRACTIONS): Status: ACTIVE | Noted: 2022-08-08

## 2022-08-08 PROBLEM — I49.1 PREMATURE ATRIAL CONTRACTIONS: Status: ACTIVE | Noted: 2022-08-08

## 2022-08-08 PROBLEM — I35.0 NONRHEUMATIC AORTIC VALVE STENOSIS: Status: ACTIVE | Noted: 2022-08-08

## 2022-08-09 NOTE — PROGRESS NOTES
"Subjective:   Patient ID:  Viraj Olsen is a 71 y.o. male who presents for evaluation abnormal Holter    HPI: A 48 hour Holter was ordered for "skipped beats " on physical and showed PVCs and PACs. In 2020, and echo described AS as mild but reported AUSTIN 1.3s which is moderately reducedThe patient has no chest pain, SOB, TIA, palpitations, syncope or pre-syncope.Patient does  Exercise and very physically active.        Review of Systems   Constitutional: Negative for chills, decreased appetite, diaphoresis, fever, malaise/fatigue, night sweats, weight gain and weight loss.   HENT: Negative for congestion, hoarse voice, nosebleeds, sore throat and tinnitus.    Eyes: Negative for blurred vision, double vision, vision loss in left eye, vision loss in right eye, visual disturbance and visual halos.   Cardiovascular: Negative for chest pain, claudication, cyanosis, dyspnea on exertion, irregular heartbeat, leg swelling, near-syncope, orthopnea, palpitations, paroxysmal nocturnal dyspnea and syncope.   Respiratory: Negative for cough, hemoptysis, shortness of breath, sleep disturbances due to breathing, snoring, sputum production and wheezing.    Endocrine: Negative for cold intolerance, heat intolerance, polydipsia, polyphagia and polyuria.   Hematologic/Lymphatic: Negative for adenopathy and bleeding problem. Does not bruise/bleed easily.   Skin: Negative for color change, dry skin, flushing, itching, nail changes, poor wound healing, rash, skin cancer, suspicious lesions and unusual hair distribution.   Musculoskeletal: Negative for arthritis, back pain, falls, gout, joint pain, joint swelling, muscle cramps, muscle weakness, myalgias and stiffness.   Gastrointestinal: Negative for abdominal pain, anorexia, change in bowel habit, constipation, diarrhea, dysphagia, heartburn, hematemesis, hematochezia, melena and vomiting.   Genitourinary: Negative for decreased libido, dysuria, hematuria, hesitancy and urgency. " "  Neurological: Negative for excessive daytime sleepiness, dizziness, focal weakness, headaches, light-headedness, loss of balance, numbness, paresthesias, seizures, sensory change, tremors, vertigo and weakness.   Psychiatric/Behavioral: Negative for altered mental status, depression, hallucinations, memory loss, substance abuse and suicidal ideas. The patient does not have insomnia and is not nervous/anxious.    Allergic/Immunologic: Negative for environmental allergies and hives.       Objective: /66 (BP Location: Left arm, Patient Position: Sitting, BP Method: Medium (Automatic))   Pulse 88   Ht 5' 10.5" (1.791 m)   Wt 89.9 kg (198 lb 3.1 oz)   BMI 28.04 kg/m²      Physical Exam  Constitutional:       General: He is not in acute distress.     Appearance: He is well-developed. He is not diaphoretic.   HENT:      Head: Normocephalic.   Eyes:      Pupils: Pupils are equal, round, and reactive to light.   Neck:      Thyroid: No thyromegaly.   Cardiovascular:      Rate and Rhythm: Normal rate and regular rhythm.      Pulses: Intact distal pulses.           Carotid pulses are 3+ on the right side and 3+ on the left side.       Radial pulses are 3+ on the right side and 3+ on the left side.        Femoral pulses are 3+ on the right side and 3+ on the left side.       Popliteal pulses are 3+ on the right side and 3+ on the left side.        Dorsalis pedis pulses are 3+ on the right side and 3+ on the left side.        Posterior tibial pulses are 3+ on the right side and 3+ on the left side.      Heart sounds: Murmur heard.    Harsh midsystolic murmur is present with a grade of 1/6 at the upper right sternal border radiating to the neck.    No friction rub. No gallop.   Pulmonary:      Effort: Pulmonary effort is normal. No respiratory distress.      Breath sounds: Normal breath sounds. No wheezing or rales.   Chest:      Chest wall: No tenderness.   Abdominal:      General: There is no distension.      " Palpations: Abdomen is soft. There is no mass.      Tenderness: There is no abdominal tenderness.   Musculoskeletal:         General: Normal range of motion.      Cervical back: Normal range of motion.   Lymphadenopathy:      Cervical: No cervical adenopathy.   Skin:     General: Skin is warm.      Nails: There is no clubbing.   Neurological:      Mental Status: He is alert and oriented to person, place, and time.   Psychiatric:         Speech: Speech normal.         Behavior: Behavior normal.         Thought Content: Thought content normal.         Judgment: Judgment normal.     Actually the heart murmur is barely audible    Assessment:     1. PVCs (premature ventricular contractions)    2. Nonrheumatic aortic valve stenosis    3. Premature atrial contractions    4. Long term current use of anticoagulant therapy    5. Anti-phospholipid antibody syndrome    6. Other systemic lupus erythematosus with other organ involvement    7. PAC (premature atrial contraction)    8. PVC (premature ventricular contraction)    9. Overweight (BMI 25.0-29.9)    10. Encounter for screening for cardiovascular disorders    11. Hypercholesterolemia        Plan:   Discussed diet , achieving and maintaining ideal body weight, and exercise.   We reviewed meds in detail.  Reassured-Discussed goals, options, plan.  Omega-3 > 800 mg/d combined EPA/DHA.  Could do CAC and probably needs lipid treatment  Needs repeat CFD and stress if CAC high   If CAC high , needs Baby ASA  with warfarin or at least every other day which he is on daily now regardless  Could be on statin regardless for AS-Atorva half of 40 daily  Viraj was seen today for pac (premature atrial contraction) and  pvc (premature ventricular contraction.    Diagnoses and all orders for this visit:    PVCs (premature ventricular contractions)  -     Comprehensive Metabolic Panel; Future; Expected date: 11/10/2022    Nonrheumatic aortic valve stenosis  -     Lipid Panel; Future;  Expected date: 11/10/2022    Premature atrial contractions  -     Comprehensive Metabolic Panel; Future; Expected date: 11/10/2022    Long term current use of anticoagulant therapy    Anti-phospholipid antibody syndrome    Other systemic lupus erythematosus with other organ involvement    PAC (premature atrial contraction)  -     Ambulatory referral/consult to Cardiology    PVC (premature ventricular contraction)  -     Ambulatory referral/consult to Cardiology    Overweight (BMI 25.0-29.9)  -     Comprehensive Metabolic Panel; Future; Expected date: 11/10/2022    Encounter for screening for cardiovascular disorders  -     CT Cardiac Scoring; Future; Expected date: 08/11/2022    Hypercholesterolemia  -     Lipid Panel; Future; Expected date: 11/10/2022  -     Comprehensive Metabolic Panel; Future; Expected date: 11/10/2022    Other orders  -     zinc sulfate (ZINC-220) 50 mg zinc (220 mg) capsule; Take 220 mg by mouth once daily.  -     atorvastatin (LIPITOR) 40 MG tablet; Take 1 tablet (40 mg total) by mouth once daily.            Follow up for CAC cash pay now; labs 3 months; CFD or stress CFD after I see CAC.

## 2022-08-10 ENCOUNTER — OFFICE VISIT (OUTPATIENT)
Dept: CARDIOLOGY | Facility: CLINIC | Age: 72
End: 2022-08-10
Payer: COMMERCIAL

## 2022-08-10 VITALS
SYSTOLIC BLOOD PRESSURE: 111 MMHG | HEIGHT: 71 IN | HEART RATE: 88 BPM | DIASTOLIC BLOOD PRESSURE: 66 MMHG | BODY MASS INDEX: 27.75 KG/M2 | WEIGHT: 198.19 LBS

## 2022-08-10 DIAGNOSIS — E78.00 HYPERCHOLESTEROLEMIA: ICD-10-CM

## 2022-08-10 DIAGNOSIS — Z79.01 LONG TERM CURRENT USE OF ANTICOAGULANT THERAPY: ICD-10-CM

## 2022-08-10 DIAGNOSIS — I49.3 PVC (PREMATURE VENTRICULAR CONTRACTION): ICD-10-CM

## 2022-08-10 DIAGNOSIS — Z13.6 ENCOUNTER FOR SCREENING FOR CARDIOVASCULAR DISORDERS: ICD-10-CM

## 2022-08-10 DIAGNOSIS — M32.19 OTHER SYSTEMIC LUPUS ERYTHEMATOSUS WITH OTHER ORGAN INVOLVEMENT: ICD-10-CM

## 2022-08-10 DIAGNOSIS — E66.3 OVERWEIGHT (BMI 25.0-29.9): ICD-10-CM

## 2022-08-10 DIAGNOSIS — I49.3 PVCS (PREMATURE VENTRICULAR CONTRACTIONS): Primary | ICD-10-CM

## 2022-08-10 DIAGNOSIS — I49.1 PAC (PREMATURE ATRIAL CONTRACTION): ICD-10-CM

## 2022-08-10 DIAGNOSIS — D68.61 ANTI-PHOSPHOLIPID ANTIBODY SYNDROME: Chronic | ICD-10-CM

## 2022-08-10 DIAGNOSIS — I49.1 PREMATURE ATRIAL CONTRACTIONS: ICD-10-CM

## 2022-08-10 DIAGNOSIS — I35.0 NONRHEUMATIC AORTIC VALVE STENOSIS: ICD-10-CM

## 2022-08-10 PROCEDURE — 99204 OFFICE O/P NEW MOD 45 MIN: CPT | Mod: S$GLB,,, | Performed by: INTERNAL MEDICINE

## 2022-08-10 PROCEDURE — 3074F PR MOST RECENT SYSTOLIC BLOOD PRESSURE < 130 MM HG: ICD-10-PCS | Mod: CPTII,S$GLB,, | Performed by: INTERNAL MEDICINE

## 2022-08-10 PROCEDURE — 1126F PR PAIN SEVERITY QUANTIFIED, NO PAIN PRESENT: ICD-10-PCS | Mod: CPTII,S$GLB,, | Performed by: INTERNAL MEDICINE

## 2022-08-10 PROCEDURE — 1160F RVW MEDS BY RX/DR IN RCRD: CPT | Mod: CPTII,S$GLB,, | Performed by: INTERNAL MEDICINE

## 2022-08-10 PROCEDURE — 3078F PR MOST RECENT DIASTOLIC BLOOD PRESSURE < 80 MM HG: ICD-10-PCS | Mod: CPTII,S$GLB,, | Performed by: INTERNAL MEDICINE

## 2022-08-10 PROCEDURE — 3008F BODY MASS INDEX DOCD: CPT | Mod: CPTII,S$GLB,, | Performed by: INTERNAL MEDICINE

## 2022-08-10 PROCEDURE — 99204 PR OFFICE/OUTPT VISIT, NEW, LEVL IV, 45-59 MIN: ICD-10-PCS | Mod: S$GLB,,, | Performed by: INTERNAL MEDICINE

## 2022-08-10 PROCEDURE — 3008F PR BODY MASS INDEX (BMI) DOCUMENTED: ICD-10-PCS | Mod: CPTII,S$GLB,, | Performed by: INTERNAL MEDICINE

## 2022-08-10 PROCEDURE — 99999 PR PBB SHADOW E&M-EST. PATIENT-LVL IV: CPT | Mod: PBBFAC,,, | Performed by: INTERNAL MEDICINE

## 2022-08-10 PROCEDURE — 1159F PR MEDICATION LIST DOCUMENTED IN MEDICAL RECORD: ICD-10-PCS | Mod: CPTII,S$GLB,, | Performed by: INTERNAL MEDICINE

## 2022-08-10 PROCEDURE — 3078F DIAST BP <80 MM HG: CPT | Mod: CPTII,S$GLB,, | Performed by: INTERNAL MEDICINE

## 2022-08-10 PROCEDURE — 3074F SYST BP LT 130 MM HG: CPT | Mod: CPTII,S$GLB,, | Performed by: INTERNAL MEDICINE

## 2022-08-10 PROCEDURE — 1159F MED LIST DOCD IN RCRD: CPT | Mod: CPTII,S$GLB,, | Performed by: INTERNAL MEDICINE

## 2022-08-10 PROCEDURE — 1160F PR REVIEW ALL MEDS BY PRESCRIBER/CLIN PHARMACIST DOCUMENTED: ICD-10-PCS | Mod: CPTII,S$GLB,, | Performed by: INTERNAL MEDICINE

## 2022-08-10 PROCEDURE — 99999 PR PBB SHADOW E&M-EST. PATIENT-LVL IV: ICD-10-PCS | Mod: PBBFAC,,, | Performed by: INTERNAL MEDICINE

## 2022-08-10 PROCEDURE — 1126F AMNT PAIN NOTED NONE PRSNT: CPT | Mod: CPTII,S$GLB,, | Performed by: INTERNAL MEDICINE

## 2022-08-10 RX ORDER — ATORVASTATIN CALCIUM 40 MG/1
40 TABLET, FILM COATED ORAL DAILY
Qty: 90 TABLET | Refills: 3 | Status: SHIPPED | OUTPATIENT
Start: 2022-08-10 | End: 2023-08-30

## 2022-08-10 RX ORDER — ZINC SULFATE 50(220)MG
220 CAPSULE ORAL DAILY
COMMUNITY

## 2022-08-10 NOTE — PATIENT INSTRUCTIONS
Discussed diet , achieving and maintaining ideal body weight, and exercise.   We reviewed meds in detail.  Reassured-Discussed goals, options, plan.  Omega-3 > 800 mg/d combined EPA/DHA.  Could do CAC and probably needs lipid treatment  Needs repeat CFD and stress if CAC high   If CAC high , needs Baby ASA  with warfarin or at least every other day which he is on daily now regardless  Could be on statin regardless for AS  Atorva half of 40 daily

## 2022-08-18 ENCOUNTER — LAB VISIT (OUTPATIENT)
Dept: LAB | Facility: HOSPITAL | Age: 72
End: 2022-08-18
Attending: INTERNAL MEDICINE
Payer: COMMERCIAL

## 2022-08-18 DIAGNOSIS — Z79.01 LONG TERM CURRENT USE OF ANTICOAGULANT THERAPY: ICD-10-CM

## 2022-08-18 DIAGNOSIS — D68.61 ANTI-PHOSPHOLIPID ANTIBODY SYNDROME: ICD-10-CM

## 2022-08-18 LAB
INR PPP: 4.1 (ref 0.8–1.2)
PROTHROMBIN TIME: 38.9 SEC (ref 9–12.5)

## 2022-08-18 PROCEDURE — 85610 PROTHROMBIN TIME: CPT | Performed by: INTERNAL MEDICINE

## 2022-08-18 PROCEDURE — 36415 COLL VENOUS BLD VENIPUNCTURE: CPT | Mod: PO | Performed by: INTERNAL MEDICINE

## 2022-08-19 ENCOUNTER — HOSPITAL ENCOUNTER (OUTPATIENT)
Dept: RADIOLOGY | Facility: HOSPITAL | Age: 72
Discharge: HOME | End: 2022-08-19
Attending: INTERNAL MEDICINE
Payer: COMMERCIAL

## 2022-08-19 ENCOUNTER — ANTI-COAG VISIT (OUTPATIENT)
Dept: CARDIOLOGY | Facility: CLINIC | Age: 72
End: 2022-08-19
Payer: COMMERCIAL

## 2022-08-19 DIAGNOSIS — Z79.01 LONG TERM CURRENT USE OF ANTICOAGULANT THERAPY: ICD-10-CM

## 2022-08-19 DIAGNOSIS — D68.61 ANTI-PHOSPHOLIPID ANTIBODY SYNDROME: Primary | ICD-10-CM

## 2022-08-19 DIAGNOSIS — Z13.6 ENCOUNTER FOR SCREENING FOR CARDIOVASCULAR DISORDERS: ICD-10-CM

## 2022-08-19 PROCEDURE — 75571 CT CALCIUM SCORING CARDIAC: ICD-10-PCS | Mod: 26,,, | Performed by: RADIOLOGY

## 2022-08-19 PROCEDURE — 93793 PR ANTICOAGULANT MGMT FOR PT TAKING WARFARIN: ICD-10-PCS | Mod: S$GLB,,,

## 2022-08-19 PROCEDURE — 75571 CT HRT W/O DYE W/CA TEST: CPT | Mod: 26,,, | Performed by: RADIOLOGY

## 2022-08-19 PROCEDURE — 75571 CT HRT W/O DYE W/CA TEST: CPT | Mod: TC

## 2022-08-19 PROCEDURE — 93793 ANTICOAG MGMT PT WARFARIN: CPT | Mod: S$GLB,,,

## 2022-08-20 PROBLEM — R91.1 LUNG NODULE: Status: ACTIVE | Noted: 2022-08-20

## 2022-08-20 PROBLEM — R93.1 AGATSTON CAC SCORE 200-399: Status: ACTIVE | Noted: 2022-08-20

## 2022-08-25 ENCOUNTER — LAB VISIT (OUTPATIENT)
Dept: LAB | Facility: HOSPITAL | Age: 72
End: 2022-08-25
Attending: INTERNAL MEDICINE
Payer: COMMERCIAL

## 2022-08-25 DIAGNOSIS — D68.61 ANTI-PHOSPHOLIPID ANTIBODY SYNDROME: ICD-10-CM

## 2022-08-25 DIAGNOSIS — Z79.01 LONG TERM CURRENT USE OF ANTICOAGULANT THERAPY: ICD-10-CM

## 2022-08-25 LAB
INR PPP: 4.9 (ref 0.8–1.2)
PROTHROMBIN TIME: 47.3 SEC (ref 9–12.5)

## 2022-08-25 PROCEDURE — 36415 COLL VENOUS BLD VENIPUNCTURE: CPT | Mod: PO | Performed by: INTERNAL MEDICINE

## 2022-08-25 PROCEDURE — 85610 PROTHROMBIN TIME: CPT | Performed by: INTERNAL MEDICINE

## 2022-08-26 ENCOUNTER — ANTI-COAG VISIT (OUTPATIENT)
Dept: CARDIOLOGY | Facility: CLINIC | Age: 72
End: 2022-08-26
Payer: COMMERCIAL

## 2022-08-26 DIAGNOSIS — Z79.01 LONG TERM CURRENT USE OF ANTICOAGULANT THERAPY: ICD-10-CM

## 2022-08-26 DIAGNOSIS — D68.61 ANTI-PHOSPHOLIPID ANTIBODY SYNDROME: Primary | ICD-10-CM

## 2022-08-26 PROCEDURE — 93793 PR ANTICOAGULANT MGMT FOR PT TAKING WARFARIN: ICD-10-PCS | Mod: S$GLB,,,

## 2022-08-26 PROCEDURE — 93793 ANTICOAG MGMT PT WARFARIN: CPT | Mod: S$GLB,,,

## 2022-08-26 NOTE — PROGRESS NOTES
Patient advised of holding and coumadin instructions and next lab date, patient refused 8/29 lab date and scheduled lab for 9/1

## 2022-09-01 ENCOUNTER — LAB VISIT (OUTPATIENT)
Dept: LAB | Facility: HOSPITAL | Age: 72
End: 2022-09-01
Attending: INTERNAL MEDICINE
Payer: COMMERCIAL

## 2022-09-01 DIAGNOSIS — Z79.01 LONG TERM CURRENT USE OF ANTICOAGULANT THERAPY: ICD-10-CM

## 2022-09-01 DIAGNOSIS — D68.61 ANTI-PHOSPHOLIPID ANTIBODY SYNDROME: ICD-10-CM

## 2022-09-01 LAB
INR PPP: 3.7 (ref 0.8–1.2)
PROTHROMBIN TIME: 36.1 SEC (ref 9–12.5)

## 2022-09-01 PROCEDURE — 85610 PROTHROMBIN TIME: CPT | Performed by: INTERNAL MEDICINE

## 2022-09-01 PROCEDURE — 36415 COLL VENOUS BLD VENIPUNCTURE: CPT | Mod: PO | Performed by: INTERNAL MEDICINE

## 2022-09-02 ENCOUNTER — ANTI-COAG VISIT (OUTPATIENT)
Dept: CARDIOLOGY | Facility: CLINIC | Age: 72
End: 2022-09-02
Payer: COMMERCIAL

## 2022-09-02 DIAGNOSIS — Z79.01 LONG TERM CURRENT USE OF ANTICOAGULANT THERAPY: ICD-10-CM

## 2022-09-02 DIAGNOSIS — M32.19 OTHER SYSTEMIC LUPUS ERYTHEMATOSUS WITH OTHER ORGAN INVOLVEMENT: Primary | ICD-10-CM

## 2022-09-02 DIAGNOSIS — D68.61 ANTI-PHOSPHOLIPID ANTIBODY SYNDROME: Chronic | ICD-10-CM

## 2022-09-02 PROCEDURE — 93793 PR ANTICOAGULANT MGMT FOR PT TAKING WARFARIN: ICD-10-PCS | Mod: S$GLB,,,

## 2022-09-02 PROCEDURE — 93793 ANTICOAG MGMT PT WARFARIN: CPT | Mod: S$GLB,,,

## 2022-09-08 ENCOUNTER — LAB VISIT (OUTPATIENT)
Dept: LAB | Facility: HOSPITAL | Age: 72
End: 2022-09-08
Attending: INTERNAL MEDICINE
Payer: COMMERCIAL

## 2022-09-08 DIAGNOSIS — Z79.01 LONG TERM CURRENT USE OF ANTICOAGULANT THERAPY: ICD-10-CM

## 2022-09-08 DIAGNOSIS — D68.61 ANTI-PHOSPHOLIPID ANTIBODY SYNDROME: ICD-10-CM

## 2022-09-08 LAB
INR PPP: 3 (ref 0.8–1.2)
PROTHROMBIN TIME: 29.3 SEC (ref 9–12.5)

## 2022-09-08 PROCEDURE — 36415 COLL VENOUS BLD VENIPUNCTURE: CPT | Mod: PO | Performed by: INTERNAL MEDICINE

## 2022-09-08 PROCEDURE — 85610 PROTHROMBIN TIME: CPT | Performed by: INTERNAL MEDICINE

## 2022-09-09 ENCOUNTER — ANTI-COAG VISIT (OUTPATIENT)
Dept: CARDIOLOGY | Facility: CLINIC | Age: 72
End: 2022-09-09
Payer: COMMERCIAL

## 2022-09-09 DIAGNOSIS — M32.19 OTHER SYSTEMIC LUPUS ERYTHEMATOSUS WITH OTHER ORGAN INVOLVEMENT: ICD-10-CM

## 2022-09-09 DIAGNOSIS — D68.61 ANTI-PHOSPHOLIPID ANTIBODY SYNDROME: Primary | Chronic | ICD-10-CM

## 2022-09-09 DIAGNOSIS — Z79.01 LONG TERM CURRENT USE OF ANTICOAGULANT THERAPY: ICD-10-CM

## 2022-09-09 PROCEDURE — 93793 ANTICOAG MGMT PT WARFARIN: CPT | Mod: S$GLB,,,

## 2022-09-09 PROCEDURE — 93793 PR ANTICOAGULANT MGMT FOR PT TAKING WARFARIN: ICD-10-PCS | Mod: S$GLB,,,

## 2022-09-11 ENCOUNTER — PATIENT MESSAGE (OUTPATIENT)
Dept: RHEUMATOLOGY | Facility: CLINIC | Age: 72
End: 2022-09-11
Payer: COMMERCIAL

## 2022-09-12 DIAGNOSIS — M32.19 OTHER SYSTEMIC LUPUS ERYTHEMATOSUS WITH OTHER ORGAN INVOLVEMENT: ICD-10-CM

## 2022-09-13 RX ORDER — HYDROXYCHLOROQUINE SULFATE 200 MG/1
200 TABLET, FILM COATED ORAL 2 TIMES DAILY
Qty: 180 TABLET | Refills: 0 | Status: SHIPPED | OUTPATIENT
Start: 2022-09-13 | End: 2022-12-09 | Stop reason: SDUPTHER

## 2022-09-15 ENCOUNTER — LAB VISIT (OUTPATIENT)
Dept: LAB | Facility: HOSPITAL | Age: 72
End: 2022-09-15
Attending: INTERNAL MEDICINE
Payer: COMMERCIAL

## 2022-09-15 DIAGNOSIS — D68.61 ANTI-PHOSPHOLIPID ANTIBODY SYNDROME: ICD-10-CM

## 2022-09-15 DIAGNOSIS — Z79.01 LONG TERM CURRENT USE OF ANTICOAGULANT THERAPY: ICD-10-CM

## 2022-09-15 LAB
INR PPP: 3.1 (ref 0.8–1.2)
PROTHROMBIN TIME: 30.2 SEC (ref 9–12.5)

## 2022-09-15 PROCEDURE — 85610 PROTHROMBIN TIME: CPT | Performed by: INTERNAL MEDICINE

## 2022-09-15 PROCEDURE — 36415 COLL VENOUS BLD VENIPUNCTURE: CPT | Mod: PO | Performed by: INTERNAL MEDICINE

## 2022-09-16 ENCOUNTER — ANTI-COAG VISIT (OUTPATIENT)
Dept: CARDIOLOGY | Facility: CLINIC | Age: 72
End: 2022-09-16
Payer: COMMERCIAL

## 2022-09-16 DIAGNOSIS — Z79.01 LONG TERM CURRENT USE OF ANTICOAGULANT THERAPY: ICD-10-CM

## 2022-09-16 DIAGNOSIS — D68.61 ANTI-PHOSPHOLIPID ANTIBODY SYNDROME: Primary | Chronic | ICD-10-CM

## 2022-09-16 PROCEDURE — 93793 ANTICOAG MGMT PT WARFARIN: CPT | Mod: S$GLB,,,

## 2022-09-16 PROCEDURE — 93793 PR ANTICOAGULANT MGMT FOR PT TAKING WARFARIN: ICD-10-PCS | Mod: S$GLB,,,

## 2022-09-29 ENCOUNTER — LAB VISIT (OUTPATIENT)
Dept: LAB | Facility: HOSPITAL | Age: 72
End: 2022-09-29
Attending: INTERNAL MEDICINE
Payer: COMMERCIAL

## 2022-09-29 DIAGNOSIS — D68.61 ANTI-PHOSPHOLIPID ANTIBODY SYNDROME: Chronic | ICD-10-CM

## 2022-09-29 DIAGNOSIS — Z79.01 LONG TERM CURRENT USE OF ANTICOAGULANT THERAPY: ICD-10-CM

## 2022-09-29 LAB
INR PPP: 3.4 (ref 0.8–1.2)
PROTHROMBIN TIME: 33.5 SEC (ref 9–12.5)

## 2022-09-29 PROCEDURE — 36415 COLL VENOUS BLD VENIPUNCTURE: CPT | Mod: PO | Performed by: INTERNAL MEDICINE

## 2022-09-29 PROCEDURE — 85610 PROTHROMBIN TIME: CPT | Performed by: INTERNAL MEDICINE

## 2022-09-30 ENCOUNTER — ANTI-COAG VISIT (OUTPATIENT)
Dept: CARDIOLOGY | Facility: CLINIC | Age: 72
End: 2022-09-30
Payer: COMMERCIAL

## 2022-09-30 DIAGNOSIS — M32.19 OTHER SYSTEMIC LUPUS ERYTHEMATOSUS WITH OTHER ORGAN INVOLVEMENT: ICD-10-CM

## 2022-09-30 DIAGNOSIS — Z79.01 LONG TERM CURRENT USE OF ANTICOAGULANT THERAPY: ICD-10-CM

## 2022-09-30 DIAGNOSIS — D68.61 ANTI-PHOSPHOLIPID ANTIBODY SYNDROME: Primary | Chronic | ICD-10-CM

## 2022-09-30 PROCEDURE — 93793 ANTICOAG MGMT PT WARFARIN: CPT | Mod: S$GLB,,,

## 2022-09-30 PROCEDURE — 93793 PR ANTICOAGULANT MGMT FOR PT TAKING WARFARIN: ICD-10-PCS | Mod: S$GLB,,,

## 2022-10-06 ENCOUNTER — TELEPHONE (OUTPATIENT)
Dept: RHEUMATOLOGY | Facility: CLINIC | Age: 72
End: 2022-10-06
Payer: COMMERCIAL

## 2022-10-06 NOTE — TELEPHONE ENCOUNTER
Called to inform patient of the need to reschedule his 11/18/22 appointment with Dr. Quintero, due to a change in the provider's schedule.  Patient verbalizes understanding.  Appointment rescheduled to 11/23/22..

## 2022-10-13 ENCOUNTER — LAB VISIT (OUTPATIENT)
Dept: LAB | Facility: HOSPITAL | Age: 72
End: 2022-10-13
Attending: INTERNAL MEDICINE
Payer: COMMERCIAL

## 2022-10-13 DIAGNOSIS — D68.61 ANTI-PHOSPHOLIPID ANTIBODY SYNDROME: Chronic | ICD-10-CM

## 2022-10-13 DIAGNOSIS — Z79.01 LONG TERM CURRENT USE OF ANTICOAGULANT THERAPY: ICD-10-CM

## 2022-10-13 PROCEDURE — 36415 COLL VENOUS BLD VENIPUNCTURE: CPT | Mod: PO | Performed by: INTERNAL MEDICINE

## 2022-10-13 PROCEDURE — 85610 PROTHROMBIN TIME: CPT | Performed by: INTERNAL MEDICINE

## 2022-10-14 ENCOUNTER — ANTI-COAG VISIT (OUTPATIENT)
Dept: CARDIOLOGY | Facility: CLINIC | Age: 72
End: 2022-10-14
Payer: COMMERCIAL

## 2022-10-14 DIAGNOSIS — M32.19 OTHER SYSTEMIC LUPUS ERYTHEMATOSUS WITH OTHER ORGAN INVOLVEMENT: ICD-10-CM

## 2022-10-14 DIAGNOSIS — Z79.01 LONG TERM CURRENT USE OF ANTICOAGULANT THERAPY: ICD-10-CM

## 2022-10-14 DIAGNOSIS — D68.61 ANTI-PHOSPHOLIPID ANTIBODY SYNDROME: Primary | Chronic | ICD-10-CM

## 2022-10-14 LAB
INR PPP: 3.9 (ref 0.8–1.2)
PROTHROMBIN TIME: 37.4 SEC (ref 9–12.5)

## 2022-10-14 PROCEDURE — 93793 ANTICOAG MGMT PT WARFARIN: CPT | Mod: S$GLB,,,

## 2022-10-14 PROCEDURE — 93793 PR ANTICOAGULANT MGMT FOR PT TAKING WARFARIN: ICD-10-PCS | Mod: S$GLB,,,

## 2022-10-14 NOTE — PROGRESS NOTES
INR not at goal. Medications, chart, and patient findings reviewed. See calendar for adjustments to dose and follow up plan. Patient reports recently completed medrol dose pack.

## 2022-10-20 ENCOUNTER — LAB VISIT (OUTPATIENT)
Dept: LAB | Facility: HOSPITAL | Age: 72
End: 2022-10-20
Attending: INTERNAL MEDICINE
Payer: MEDICARE

## 2022-10-20 DIAGNOSIS — D68.61 ANTI-PHOSPHOLIPID ANTIBODY SYNDROME: Chronic | ICD-10-CM

## 2022-10-20 DIAGNOSIS — Z79.01 LONG TERM CURRENT USE OF ANTICOAGULANT THERAPY: ICD-10-CM

## 2022-10-20 LAB
INR PPP: 3.7 (ref 0.8–1.2)
PROTHROMBIN TIME: 35.4 SEC (ref 9–12.5)

## 2022-10-20 PROCEDURE — 36415 COLL VENOUS BLD VENIPUNCTURE: CPT | Mod: PO | Performed by: INTERNAL MEDICINE

## 2022-10-20 PROCEDURE — 85610 PROTHROMBIN TIME: CPT | Performed by: INTERNAL MEDICINE

## 2022-10-21 ENCOUNTER — ANTI-COAG VISIT (OUTPATIENT)
Dept: CARDIOLOGY | Facility: CLINIC | Age: 72
End: 2022-10-21
Payer: COMMERCIAL

## 2022-10-21 DIAGNOSIS — D68.61 ANTI-PHOSPHOLIPID ANTIBODY SYNDROME: Primary | Chronic | ICD-10-CM

## 2022-10-21 DIAGNOSIS — Z79.01 LONG TERM CURRENT USE OF ANTICOAGULANT THERAPY: ICD-10-CM

## 2022-10-21 PROCEDURE — 93793 PR ANTICOAGULANT MGMT FOR PT TAKING WARFARIN: ICD-10-PCS | Mod: S$GLB,,,

## 2022-10-21 PROCEDURE — 93793 ANTICOAG MGMT PT WARFARIN: CPT | Mod: S$GLB,,,

## 2022-10-21 NOTE — PROGRESS NOTES
INR not at goal. Medications, chart, and patient findings reviewed. Recommend 2.5mg 10/21 and reduced maintenance dose per calendar. See calendar for adjustments to dose and follow up plan.

## 2022-10-27 ENCOUNTER — LAB VISIT (OUTPATIENT)
Dept: LAB | Facility: HOSPITAL | Age: 72
End: 2022-10-27
Attending: INTERNAL MEDICINE
Payer: MEDICARE

## 2022-10-27 DIAGNOSIS — Z79.01 LONG TERM CURRENT USE OF ANTICOAGULANT THERAPY: ICD-10-CM

## 2022-10-27 DIAGNOSIS — D68.61 ANTI-PHOSPHOLIPID ANTIBODY SYNDROME: Chronic | ICD-10-CM

## 2022-10-27 PROCEDURE — 36415 COLL VENOUS BLD VENIPUNCTURE: CPT | Mod: PO | Performed by: INTERNAL MEDICINE

## 2022-10-27 PROCEDURE — 85610 PROTHROMBIN TIME: CPT | Performed by: INTERNAL MEDICINE

## 2022-10-28 ENCOUNTER — ANTI-COAG VISIT (OUTPATIENT)
Dept: CARDIOLOGY | Facility: CLINIC | Age: 72
End: 2022-10-28
Payer: COMMERCIAL

## 2022-10-28 DIAGNOSIS — Z79.01 LONG TERM CURRENT USE OF ANTICOAGULANT THERAPY: ICD-10-CM

## 2022-10-28 DIAGNOSIS — D68.61 ANTI-PHOSPHOLIPID ANTIBODY SYNDROME: Primary | Chronic | ICD-10-CM

## 2022-10-28 DIAGNOSIS — M32.19 OTHER SYSTEMIC LUPUS ERYTHEMATOSUS WITH OTHER ORGAN INVOLVEMENT: ICD-10-CM

## 2022-10-28 LAB
INR PPP: 2.7 (ref 0.8–1.2)
PROTHROMBIN TIME: 26.8 SEC (ref 9–12.5)

## 2022-10-28 PROCEDURE — 93793 PR ANTICOAGULANT MGMT FOR PT TAKING WARFARIN: ICD-10-PCS | Mod: S$GLB,,,

## 2022-10-28 PROCEDURE — 93793 ANTICOAG MGMT PT WARFARIN: CPT | Mod: S$GLB,,,

## 2022-11-03 ENCOUNTER — LAB VISIT (OUTPATIENT)
Dept: LAB | Facility: HOSPITAL | Age: 72
End: 2022-11-03
Attending: INTERNAL MEDICINE
Payer: MEDICARE

## 2022-11-03 DIAGNOSIS — Z79.01 LONG TERM CURRENT USE OF ANTICOAGULANT THERAPY: ICD-10-CM

## 2022-11-03 DIAGNOSIS — D68.61 ANTI-PHOSPHOLIPID ANTIBODY SYNDROME: Chronic | ICD-10-CM

## 2022-11-03 LAB
INR PPP: 2.8 (ref 0.8–1.2)
PROTHROMBIN TIME: 27.4 SEC (ref 9–12.5)

## 2022-11-03 PROCEDURE — 85610 PROTHROMBIN TIME: CPT | Performed by: INTERNAL MEDICINE

## 2022-11-03 PROCEDURE — 36415 COLL VENOUS BLD VENIPUNCTURE: CPT | Mod: PO | Performed by: INTERNAL MEDICINE

## 2022-11-04 ENCOUNTER — ANTI-COAG VISIT (OUTPATIENT)
Dept: CARDIOLOGY | Facility: CLINIC | Age: 72
End: 2022-11-04
Payer: COMMERCIAL

## 2022-11-04 DIAGNOSIS — M32.19 OTHER SYSTEMIC LUPUS ERYTHEMATOSUS WITH OTHER ORGAN INVOLVEMENT: ICD-10-CM

## 2022-11-04 DIAGNOSIS — Z79.01 LONG TERM CURRENT USE OF ANTICOAGULANT THERAPY: ICD-10-CM

## 2022-11-04 DIAGNOSIS — D68.61 ANTI-PHOSPHOLIPID ANTIBODY SYNDROME: Primary | Chronic | ICD-10-CM

## 2022-11-04 PROCEDURE — 93793 ANTICOAG MGMT PT WARFARIN: CPT | Mod: S$GLB,,,

## 2022-11-04 PROCEDURE — 93793 PR ANTICOAGULANT MGMT FOR PT TAKING WARFARIN: ICD-10-PCS | Mod: S$GLB,,,

## 2022-11-11 ENCOUNTER — LAB VISIT (OUTPATIENT)
Dept: LAB | Facility: HOSPITAL | Age: 72
End: 2022-11-11
Attending: INTERNAL MEDICINE
Payer: COMMERCIAL

## 2022-11-11 DIAGNOSIS — I49.1 PREMATURE ATRIAL CONTRACTIONS: ICD-10-CM

## 2022-11-11 DIAGNOSIS — E78.00 HYPERCHOLESTEROLEMIA: ICD-10-CM

## 2022-11-11 DIAGNOSIS — I49.3 PVCS (PREMATURE VENTRICULAR CONTRACTIONS): ICD-10-CM

## 2022-11-11 DIAGNOSIS — I35.0 NONRHEUMATIC AORTIC VALVE STENOSIS: ICD-10-CM

## 2022-11-11 DIAGNOSIS — E66.3 OVERWEIGHT (BMI 25.0-29.9): ICD-10-CM

## 2022-11-11 LAB
ALBUMIN SERPL BCP-MCNC: 3.8 G/DL (ref 3.5–5.2)
ALP SERPL-CCNC: 74 U/L (ref 55–135)
ALT SERPL W/O P-5'-P-CCNC: 27 U/L (ref 10–44)
ANION GAP SERPL CALC-SCNC: 5 MMOL/L (ref 8–16)
AST SERPL-CCNC: 27 U/L (ref 10–40)
BILIRUB SERPL-MCNC: 0.6 MG/DL (ref 0.1–1)
BUN SERPL-MCNC: 21 MG/DL (ref 8–23)
CALCIUM SERPL-MCNC: 9.6 MG/DL (ref 8.7–10.5)
CHLORIDE SERPL-SCNC: 105 MMOL/L (ref 95–110)
CHOLEST SERPL-MCNC: 110 MG/DL (ref 120–199)
CHOLEST/HDLC SERPL: 2.6 {RATIO} (ref 2–5)
CO2 SERPL-SCNC: 29 MMOL/L (ref 23–29)
CREAT SERPL-MCNC: 0.9 MG/DL (ref 0.5–1.4)
EST. GFR  (NO RACE VARIABLE): >60 ML/MIN/1.73 M^2
GLUCOSE SERPL-MCNC: 82 MG/DL (ref 70–110)
HDLC SERPL-MCNC: 42 MG/DL (ref 40–75)
HDLC SERPL: 38.2 % (ref 20–50)
LDLC SERPL CALC-MCNC: 53.2 MG/DL (ref 63–159)
NONHDLC SERPL-MCNC: 68 MG/DL
POTASSIUM SERPL-SCNC: 4.3 MMOL/L (ref 3.5–5.1)
PROT SERPL-MCNC: 6.7 G/DL (ref 6–8.4)
SODIUM SERPL-SCNC: 139 MMOL/L (ref 136–145)
TRIGL SERPL-MCNC: 74 MG/DL (ref 30–150)

## 2022-11-11 PROCEDURE — 36415 COLL VENOUS BLD VENIPUNCTURE: CPT | Performed by: INTERNAL MEDICINE

## 2022-11-11 PROCEDURE — 80053 COMPREHEN METABOLIC PANEL: CPT | Performed by: INTERNAL MEDICINE

## 2022-11-11 PROCEDURE — 80061 LIPID PANEL: CPT | Performed by: INTERNAL MEDICINE

## 2022-11-17 ENCOUNTER — LAB VISIT (OUTPATIENT)
Dept: LAB | Facility: HOSPITAL | Age: 72
End: 2022-11-17
Attending: INTERNAL MEDICINE
Payer: COMMERCIAL

## 2022-11-17 DIAGNOSIS — Z79.01 LONG TERM CURRENT USE OF ANTICOAGULANT THERAPY: ICD-10-CM

## 2022-11-17 DIAGNOSIS — D68.61 ANTI-PHOSPHOLIPID ANTIBODY SYNDROME: Chronic | ICD-10-CM

## 2022-11-17 PROCEDURE — 85610 PROTHROMBIN TIME: CPT | Performed by: INTERNAL MEDICINE

## 2022-11-17 PROCEDURE — 36415 COLL VENOUS BLD VENIPUNCTURE: CPT | Mod: PO | Performed by: INTERNAL MEDICINE

## 2022-11-18 ENCOUNTER — ANTI-COAG VISIT (OUTPATIENT)
Dept: CARDIOLOGY | Facility: CLINIC | Age: 72
End: 2022-11-18
Payer: COMMERCIAL

## 2022-11-18 DIAGNOSIS — Z79.01 LONG TERM CURRENT USE OF ANTICOAGULANT THERAPY: ICD-10-CM

## 2022-11-18 DIAGNOSIS — D68.61 ANTI-PHOSPHOLIPID ANTIBODY SYNDROME: Primary | Chronic | ICD-10-CM

## 2022-11-18 LAB
INR PPP: 2.8 (ref 0.8–1.2)
PROTHROMBIN TIME: 27.6 SEC (ref 9–12.5)

## 2022-11-18 PROCEDURE — 93793 ANTICOAG MGMT PT WARFARIN: CPT | Mod: S$GLB,,,

## 2022-11-18 PROCEDURE — 93793 PR ANTICOAGULANT MGMT FOR PT TAKING WARFARIN: ICD-10-PCS | Mod: S$GLB,,,

## 2022-11-23 ENCOUNTER — OFFICE VISIT (OUTPATIENT)
Dept: RHEUMATOLOGY | Facility: CLINIC | Age: 72
End: 2022-11-23
Payer: COMMERCIAL

## 2022-11-23 VITALS
DIASTOLIC BLOOD PRESSURE: 63 MMHG | BODY MASS INDEX: 28.66 KG/M2 | WEIGHT: 200.19 LBS | HEART RATE: 73 BPM | SYSTOLIC BLOOD PRESSURE: 106 MMHG | HEIGHT: 70 IN

## 2022-11-23 DIAGNOSIS — M32.19 OTHER SYSTEMIC LUPUS ERYTHEMATOSUS WITH OTHER ORGAN INVOLVEMENT: Primary | ICD-10-CM

## 2022-11-23 DIAGNOSIS — R91.1 LUNG NODULE: ICD-10-CM

## 2022-11-23 DIAGNOSIS — M17.2 POST-TRAUMATIC OSTEOARTHRITIS OF BOTH KNEES: ICD-10-CM

## 2022-11-23 DIAGNOSIS — D68.61 ANTI-PHOSPHOLIPID ANTIBODY SYNDROME: Chronic | ICD-10-CM

## 2022-11-23 DIAGNOSIS — Z79.899 HIGH RISK MEDICATION USE: ICD-10-CM

## 2022-11-23 PROCEDURE — 3078F DIAST BP <80 MM HG: CPT | Mod: CPTII,S$GLB,, | Performed by: INTERNAL MEDICINE

## 2022-11-23 PROCEDURE — 3008F PR BODY MASS INDEX (BMI) DOCUMENTED: ICD-10-PCS | Mod: CPTII,S$GLB,, | Performed by: INTERNAL MEDICINE

## 2022-11-23 PROCEDURE — 3078F PR MOST RECENT DIASTOLIC BLOOD PRESSURE < 80 MM HG: ICD-10-PCS | Mod: CPTII,S$GLB,, | Performed by: INTERNAL MEDICINE

## 2022-11-23 PROCEDURE — 1159F PR MEDICATION LIST DOCUMENTED IN MEDICAL RECORD: ICD-10-PCS | Mod: CPTII,S$GLB,, | Performed by: INTERNAL MEDICINE

## 2022-11-23 PROCEDURE — 99214 OFFICE O/P EST MOD 30 MIN: CPT | Mod: S$GLB,,, | Performed by: INTERNAL MEDICINE

## 2022-11-23 PROCEDURE — 3074F PR MOST RECENT SYSTOLIC BLOOD PRESSURE < 130 MM HG: ICD-10-PCS | Mod: CPTII,S$GLB,, | Performed by: INTERNAL MEDICINE

## 2022-11-23 PROCEDURE — 99999 PR PBB SHADOW E&M-EST. PATIENT-LVL III: ICD-10-PCS | Mod: PBBFAC,,, | Performed by: INTERNAL MEDICINE

## 2022-11-23 PROCEDURE — 3008F BODY MASS INDEX DOCD: CPT | Mod: CPTII,S$GLB,, | Performed by: INTERNAL MEDICINE

## 2022-11-23 PROCEDURE — 1125F PR PAIN SEVERITY QUANTIFIED, PAIN PRESENT: ICD-10-PCS | Mod: CPTII,S$GLB,, | Performed by: INTERNAL MEDICINE

## 2022-11-23 PROCEDURE — 1125F AMNT PAIN NOTED PAIN PRSNT: CPT | Mod: CPTII,S$GLB,, | Performed by: INTERNAL MEDICINE

## 2022-11-23 PROCEDURE — 99214 PR OFFICE/OUTPT VISIT, EST, LEVL IV, 30-39 MIN: ICD-10-PCS | Mod: S$GLB,,, | Performed by: INTERNAL MEDICINE

## 2022-11-23 PROCEDURE — 3074F SYST BP LT 130 MM HG: CPT | Mod: CPTII,S$GLB,, | Performed by: INTERNAL MEDICINE

## 2022-11-23 PROCEDURE — 99999 PR PBB SHADOW E&M-EST. PATIENT-LVL III: CPT | Mod: PBBFAC,,, | Performed by: INTERNAL MEDICINE

## 2022-11-23 PROCEDURE — 1159F MED LIST DOCD IN RCRD: CPT | Mod: CPTII,S$GLB,, | Performed by: INTERNAL MEDICINE

## 2022-11-23 ASSESSMENT — ROUTINE ASSESSMENT OF PATIENT INDEX DATA (RAPID3)
PAIN SCORE: 0.5
FATIGUE SCORE: 0
PSYCHOLOGICAL DISTRESS SCORE: 0
MDHAQ FUNCTION SCORE: 0.2
FATIGUE SCORE: 0
PATIENT GLOBAL ASSESSMENT SCORE: 0
TOTAL RAPID3 SCORE: 0.39
AM STIFFNESS SCORE: 1, YES
MDHAQ FUNCTION SCORE: 0.2
PAIN SCORE: 0.5
PSYCHOLOGICAL DISTRESS SCORE: 0
PATIENT GLOBAL ASSESSMENT SCORE: 0
TOTAL RAPID3 SCORE: 0.39

## 2022-11-23 NOTE — ASSESSMENT & PLAN NOTE
No clinical recurrence of symptoms on hydroxychloroquine and warfarin    Needs f/u lab for SLE; will get with next protime check    If lab ok then reduce hydroxychloroquine to 200 mg/d and RTC me 1 year

## 2022-11-23 NOTE — PROGRESS NOTES
"Subjective:       Patient ID: Viraj Olsen is a 72 y.o. male.    Chief Complaint: Disease Management    HPI    F/u SLE , APLA    Systemic lupus erythematosus since 1995, with neuro-psychiatric lesions in 2006 with recurrence in 2007, treated to remission with hydroxychloroquine, azathioprine, and prednisone.  HCQ since 1995    Hx antiphospholipid with TIA's, on chronic coumadin since 2006     /d   Celebrex 3-4/week (M, W, F/Sat)     Hx colon polyp 2010     Had eye exam Jan 2022     Still working; will retired March 2022    Had bigger bruises and new murmur so saw Dr Wilson who recommended Lipitor and did cardiac CT  Had cardiac calcium    Cardiac ct showed small nodule that necessitates f/u scan 6-12 months    Review of Systems   Constitutional:  Negative for fatigue, fever and unexpected weight change.   HENT:  Negative for mouth sores.    Respiratory:  Negative for cough and shortness of breath.    Cardiovascular:  Negative for chest pain.   Gastrointestinal:  Positive for constipation. Negative for diarrhea.   Genitourinary:  Negative for dysuria.   Skin:  Negative for rash.   Neurological:  Negative for headaches.   Hematological:  Bruises/bleeds easily.   Psychiatric/Behavioral:  Negative for sleep disturbance.        Objective:   /63   Pulse 73   Ht 5' 10" (1.778 m)   Wt 90.8 kg (200 lb 2.8 oz)   BMI 28.72 kg/m²      Physical Exam   Constitutional: normal appearance.   HENT:   Nose: Nose normal.   Mouth/Throat: No ulcers  Eyes: No scleral icterus.   Cardiovascular: Normal rate and regular rhythm.   Murmur heard.  Pulmonary/Chest: He has no wheezes. He has no rales.   Abdominal: There is no abdominal tenderness.   Musculoskeletal:      Comments: OA changes hands  Some decreased RANGE OF MOTION neck   Lymphadenopathy:     He has no cervical adenopathy.   Skin: No rash noted.       Assessment:       1. Other systemic lupus erythematosus with other organ involvement    2. Anti-phospholipid " antibody syndrome    3. Post-traumatic osteoarthritis of both knees    4. High risk medication use    5. Lung nodule            Plan:       Problem List Items Addressed This Visit          Active Problems    Anti-phospholipid antibody syndrome (Chronic)     No symptoms on warfarin, and no complications of warfarin         Other forms of systemic lupus erythematosus - Primary     No clinical recurrence of symptoms on hydroxychloroquine and warfarin    Needs f/u lab for SLE; will get with next protime check    If lab ok then reduce hydroxychloroquine to 200 mg/d and RTC me 1 year         Relevant Orders    C4 Complement    Anti-DNA Ab, Double-Stranded    C3 Complement    Sedimentation rate    CBC Auto Differential    Comprehensive Metabolic Panel    Urinalysis    Direct antiglobulin test    Protein/Creatinine Ratio, Urine    Post-traumatic osteoarthritis of knee     Minimally symptomatic; takes celecoxib 4 days/week         High risk medication use     No treatment related adverse effects; will continue to monitor for drug toxicity           Lung nodule     Per radiology guidelines he should have f/u CT chest at 6-12 months (Feb-Aug 2023)

## 2022-12-08 ENCOUNTER — LAB VISIT (OUTPATIENT)
Dept: LAB | Facility: HOSPITAL | Age: 72
End: 2022-12-08
Attending: INTERNAL MEDICINE
Payer: MEDICARE

## 2022-12-08 DIAGNOSIS — D68.61 ANTI-PHOSPHOLIPID ANTIBODY SYNDROME: Chronic | ICD-10-CM

## 2022-12-08 DIAGNOSIS — Z79.01 LONG TERM CURRENT USE OF ANTICOAGULANT THERAPY: ICD-10-CM

## 2022-12-08 LAB
INR PPP: 2.9 (ref 0.8–1.2)
PROTHROMBIN TIME: 28.7 SEC (ref 9–12.5)

## 2022-12-08 PROCEDURE — 85610 PROTHROMBIN TIME: CPT | Performed by: INTERNAL MEDICINE

## 2022-12-08 PROCEDURE — 36415 COLL VENOUS BLD VENIPUNCTURE: CPT | Mod: PO | Performed by: INTERNAL MEDICINE

## 2022-12-09 ENCOUNTER — PATIENT MESSAGE (OUTPATIENT)
Dept: RHEUMATOLOGY | Facility: CLINIC | Age: 72
End: 2022-12-09
Payer: MEDICARE

## 2022-12-09 ENCOUNTER — ANTI-COAG VISIT (OUTPATIENT)
Dept: CARDIOLOGY | Facility: CLINIC | Age: 72
End: 2022-12-09
Payer: COMMERCIAL

## 2022-12-09 ENCOUNTER — LAB VISIT (OUTPATIENT)
Dept: LAB | Facility: HOSPITAL | Age: 72
End: 2022-12-09
Attending: INTERNAL MEDICINE
Payer: MEDICARE

## 2022-12-09 DIAGNOSIS — M32.19 OTHER SYSTEMIC LUPUS ERYTHEMATOSUS WITH OTHER ORGAN INVOLVEMENT: ICD-10-CM

## 2022-12-09 DIAGNOSIS — D68.61 ANTI-PHOSPHOLIPID ANTIBODY SYNDROME: Primary | Chronic | ICD-10-CM

## 2022-12-09 DIAGNOSIS — Z79.01 LONG TERM CURRENT USE OF ANTICOAGULANT THERAPY: ICD-10-CM

## 2022-12-09 LAB
ALBUMIN SERPL BCP-MCNC: 4 G/DL (ref 3.5–5.2)
ALP SERPL-CCNC: 92 U/L (ref 55–135)
ALT SERPL W/O P-5'-P-CCNC: 25 U/L (ref 10–44)
ANION GAP SERPL CALC-SCNC: 7 MMOL/L (ref 8–16)
AST SERPL-CCNC: 27 U/L (ref 10–40)
BASOPHILS # BLD AUTO: 0.04 K/UL (ref 0–0.2)
BASOPHILS NFR BLD: 0.6 % (ref 0–1.9)
BILIRUB SERPL-MCNC: 0.5 MG/DL (ref 0.1–1)
BILIRUB UR QL STRIP: NEGATIVE
BUN SERPL-MCNC: 15 MG/DL (ref 8–23)
C3 SERPL-MCNC: 88 MG/DL (ref 50–180)
C4 SERPL-MCNC: 14 MG/DL (ref 11–44)
CALCIUM SERPL-MCNC: 10.2 MG/DL (ref 8.7–10.5)
CHLORIDE SERPL-SCNC: 101 MMOL/L (ref 95–110)
CLARITY UR REFRACT.AUTO: CLEAR
CO2 SERPL-SCNC: 27 MMOL/L (ref 23–29)
COLOR UR AUTO: COLORLESS
CREAT SERPL-MCNC: 0.9 MG/DL (ref 0.5–1.4)
CREAT UR-MCNC: 21 MG/DL (ref 23–375)
DAT IGG-SP REAG RBC-IMP: NORMAL
DIFFERENTIAL METHOD: ABNORMAL
EOSINOPHIL # BLD AUTO: 0.2 K/UL (ref 0–0.5)
EOSINOPHIL NFR BLD: 3.2 % (ref 0–8)
ERYTHROCYTE [DISTWIDTH] IN BLOOD BY AUTOMATED COUNT: 12.6 % (ref 11.5–14.5)
ERYTHROCYTE [SEDIMENTATION RATE] IN BLOOD BY PHOTOMETRIC METHOD: 40 MM/HR (ref 0–23)
EST. GFR  (NO RACE VARIABLE): >60 ML/MIN/1.73 M^2
GLUCOSE SERPL-MCNC: 87 MG/DL (ref 70–110)
GLUCOSE UR QL STRIP: NEGATIVE
HCT VFR BLD AUTO: 37.7 % (ref 40–54)
HGB BLD-MCNC: 12.6 G/DL (ref 14–18)
HGB UR QL STRIP: NEGATIVE
IMM GRANULOCYTES # BLD AUTO: 0.04 K/UL (ref 0–0.04)
IMM GRANULOCYTES NFR BLD AUTO: 0.6 % (ref 0–0.5)
KETONES UR QL STRIP: NEGATIVE
LEUKOCYTE ESTERASE UR QL STRIP: NEGATIVE
LYMPHOCYTES # BLD AUTO: 1.6 K/UL (ref 1–4.8)
LYMPHOCYTES NFR BLD: 24 % (ref 18–48)
MCH RBC QN AUTO: 31 PG (ref 27–31)
MCHC RBC AUTO-ENTMCNC: 33.4 G/DL (ref 32–36)
MCV RBC AUTO: 93 FL (ref 82–98)
MONOCYTES # BLD AUTO: 0.7 K/UL (ref 0.3–1)
MONOCYTES NFR BLD: 10.9 % (ref 4–15)
NEUTROPHILS # BLD AUTO: 4 K/UL (ref 1.8–7.7)
NEUTROPHILS NFR BLD: 60.7 % (ref 38–73)
NITRITE UR QL STRIP: NEGATIVE
NRBC BLD-RTO: 0 /100 WBC
PH UR STRIP: 7 [PH] (ref 5–8)
PLATELET # BLD AUTO: 292 K/UL (ref 150–450)
PMV BLD AUTO: 9.8 FL (ref 9.2–12.9)
POTASSIUM SERPL-SCNC: 4.4 MMOL/L (ref 3.5–5.1)
PROT SERPL-MCNC: 6.9 G/DL (ref 6–8.4)
PROT UR QL STRIP: NEGATIVE
PROT UR-MCNC: <7 MG/DL (ref 0–15)
PROT/CREAT UR: ABNORMAL MG/G{CREAT} (ref 0–0.2)
RBC # BLD AUTO: 4.07 M/UL (ref 4.6–6.2)
SODIUM SERPL-SCNC: 135 MMOL/L (ref 136–145)
SP GR UR STRIP: 1 (ref 1–1.03)
URN SPEC COLLECT METH UR: ABNORMAL
WBC # BLD AUTO: 6.54 K/UL (ref 3.9–12.7)

## 2022-12-09 PROCEDURE — 93793 PR ANTICOAGULANT MGMT FOR PT TAKING WARFARIN: ICD-10-PCS | Mod: S$GLB,,,

## 2022-12-09 PROCEDURE — 86225 DNA ANTIBODY NATIVE: CPT | Performed by: INTERNAL MEDICINE

## 2022-12-09 PROCEDURE — 86160 COMPLEMENT ANTIGEN: CPT | Mod: 59 | Performed by: INTERNAL MEDICINE

## 2022-12-09 PROCEDURE — 86880 COOMBS TEST DIRECT: CPT | Performed by: INTERNAL MEDICINE

## 2022-12-09 PROCEDURE — 86160 COMPLEMENT ANTIGEN: CPT | Performed by: INTERNAL MEDICINE

## 2022-12-09 PROCEDURE — 36415 COLL VENOUS BLD VENIPUNCTURE: CPT | Mod: PO | Performed by: INTERNAL MEDICINE

## 2022-12-09 PROCEDURE — 84156 ASSAY OF PROTEIN URINE: CPT | Performed by: INTERNAL MEDICINE

## 2022-12-09 PROCEDURE — 80053 COMPREHEN METABOLIC PANEL: CPT | Performed by: INTERNAL MEDICINE

## 2022-12-09 PROCEDURE — 81003 URINALYSIS AUTO W/O SCOPE: CPT | Performed by: INTERNAL MEDICINE

## 2022-12-09 PROCEDURE — 86225 DNA ANTIBODY NATIVE: CPT | Mod: 59 | Performed by: INTERNAL MEDICINE

## 2022-12-09 PROCEDURE — 85652 RBC SED RATE AUTOMATED: CPT | Performed by: INTERNAL MEDICINE

## 2022-12-09 PROCEDURE — 85025 COMPLETE CBC W/AUTO DIFF WBC: CPT | Performed by: INTERNAL MEDICINE

## 2022-12-09 PROCEDURE — 93793 ANTICOAG MGMT PT WARFARIN: CPT | Mod: S$GLB,,,

## 2022-12-13 LAB
DNA TITER: NORMAL
DSDNA AB SER-ACNC: POSITIVE [IU]/ML

## 2022-12-14 ENCOUNTER — PATIENT MESSAGE (OUTPATIENT)
Dept: RHEUMATOLOGY | Facility: CLINIC | Age: 72
End: 2022-12-14
Payer: MEDICARE

## 2022-12-14 RX ORDER — HYDROXYCHLOROQUINE SULFATE 200 MG/1
200 TABLET, FILM COATED ORAL 2 TIMES DAILY
Qty: 180 TABLET | Refills: 1 | Status: SHIPPED | OUTPATIENT
Start: 2022-12-14 | End: 2023-06-14

## 2023-01-05 ENCOUNTER — LAB VISIT (OUTPATIENT)
Dept: LAB | Facility: HOSPITAL | Age: 73
End: 2023-01-05
Attending: INTERNAL MEDICINE
Payer: COMMERCIAL

## 2023-01-05 DIAGNOSIS — Z79.01 LONG TERM CURRENT USE OF ANTICOAGULANT THERAPY: ICD-10-CM

## 2023-01-05 DIAGNOSIS — D68.61 ANTI-PHOSPHOLIPID ANTIBODY SYNDROME: Chronic | ICD-10-CM

## 2023-01-05 PROCEDURE — 85610 PROTHROMBIN TIME: CPT | Performed by: INTERNAL MEDICINE

## 2023-01-05 PROCEDURE — 36415 COLL VENOUS BLD VENIPUNCTURE: CPT | Mod: PO | Performed by: INTERNAL MEDICINE

## 2023-01-06 ENCOUNTER — ANTI-COAG VISIT (OUTPATIENT)
Dept: CARDIOLOGY | Facility: CLINIC | Age: 73
End: 2023-01-06
Payer: COMMERCIAL

## 2023-01-06 DIAGNOSIS — M32.19 OTHER SYSTEMIC LUPUS ERYTHEMATOSUS WITH OTHER ORGAN INVOLVEMENT: ICD-10-CM

## 2023-01-06 DIAGNOSIS — D68.61 ANTI-PHOSPHOLIPID ANTIBODY SYNDROME: Primary | Chronic | ICD-10-CM

## 2023-01-06 DIAGNOSIS — Z79.01 LONG TERM CURRENT USE OF ANTICOAGULANT THERAPY: ICD-10-CM

## 2023-01-06 LAB
INR PPP: 2.4 (ref 0.8–1.2)
PROTHROMBIN TIME: 23.5 SEC (ref 9–12.5)

## 2023-01-06 PROCEDURE — 93793 ANTICOAG MGMT PT WARFARIN: CPT | Mod: S$GLB,,,

## 2023-01-06 PROCEDURE — 93793 PR ANTICOAGULANT MGMT FOR PT TAKING WARFARIN: ICD-10-PCS | Mod: S$GLB,,,

## 2023-01-19 ENCOUNTER — LAB VISIT (OUTPATIENT)
Dept: LAB | Facility: HOSPITAL | Age: 73
End: 2023-01-19
Attending: INTERNAL MEDICINE
Payer: MEDICARE

## 2023-01-19 DIAGNOSIS — D68.61 ANTI-PHOSPHOLIPID ANTIBODY SYNDROME: Chronic | ICD-10-CM

## 2023-01-19 DIAGNOSIS — Z79.01 LONG TERM CURRENT USE OF ANTICOAGULANT THERAPY: ICD-10-CM

## 2023-01-19 LAB
INR PPP: 2.4 (ref 0.8–1.2)
PROTHROMBIN TIME: 23.8 SEC (ref 9–12.5)

## 2023-01-19 PROCEDURE — 85610 PROTHROMBIN TIME: CPT | Performed by: INTERNAL MEDICINE

## 2023-01-19 PROCEDURE — 36415 COLL VENOUS BLD VENIPUNCTURE: CPT | Mod: PO | Performed by: INTERNAL MEDICINE

## 2023-01-20 ENCOUNTER — ANTI-COAG VISIT (OUTPATIENT)
Dept: CARDIOLOGY | Facility: CLINIC | Age: 73
End: 2023-01-20
Payer: COMMERCIAL

## 2023-01-20 DIAGNOSIS — M32.19 OTHER SYSTEMIC LUPUS ERYTHEMATOSUS WITH OTHER ORGAN INVOLVEMENT: ICD-10-CM

## 2023-01-20 DIAGNOSIS — Z79.01 LONG TERM CURRENT USE OF ANTICOAGULANT THERAPY: ICD-10-CM

## 2023-01-20 DIAGNOSIS — D68.61 ANTI-PHOSPHOLIPID ANTIBODY SYNDROME: Primary | Chronic | ICD-10-CM

## 2023-01-20 PROCEDURE — 93793 PR ANTICOAGULANT MGMT FOR PT TAKING WARFARIN: ICD-10-PCS | Mod: S$GLB,,,

## 2023-01-20 PROCEDURE — 93793 ANTICOAG MGMT PT WARFARIN: CPT | Mod: S$GLB,,,

## 2023-01-20 NOTE — PROGRESS NOTES
INR not at goal. Medications, chart, and patient findings reviewed. See calendar for adjustments to dose and follow up plan.         Statement Selected

## 2023-01-26 ENCOUNTER — LAB VISIT (OUTPATIENT)
Dept: LAB | Facility: HOSPITAL | Age: 73
End: 2023-01-26
Attending: INTERNAL MEDICINE
Payer: MEDICARE

## 2023-01-26 DIAGNOSIS — Z79.01 LONG TERM CURRENT USE OF ANTICOAGULANT THERAPY: ICD-10-CM

## 2023-01-26 DIAGNOSIS — D68.61 ANTI-PHOSPHOLIPID ANTIBODY SYNDROME: Chronic | ICD-10-CM

## 2023-01-26 PROCEDURE — 36415 COLL VENOUS BLD VENIPUNCTURE: CPT | Mod: PO | Performed by: INTERNAL MEDICINE

## 2023-01-26 PROCEDURE — 85610 PROTHROMBIN TIME: CPT | Performed by: INTERNAL MEDICINE

## 2023-01-27 ENCOUNTER — ANTI-COAG VISIT (OUTPATIENT)
Dept: CARDIOLOGY | Facility: CLINIC | Age: 73
End: 2023-01-27
Payer: COMMERCIAL

## 2023-01-27 DIAGNOSIS — D68.61 ANTI-PHOSPHOLIPID ANTIBODY SYNDROME: Primary | Chronic | ICD-10-CM

## 2023-01-27 DIAGNOSIS — Z79.01 LONG TERM CURRENT USE OF ANTICOAGULANT THERAPY: ICD-10-CM

## 2023-01-27 DIAGNOSIS — M32.19 OTHER SYSTEMIC LUPUS ERYTHEMATOSUS WITH OTHER ORGAN INVOLVEMENT: ICD-10-CM

## 2023-01-27 LAB
INR PPP: 2.4 (ref 0.8–1.2)
PROTHROMBIN TIME: 23.6 SEC (ref 9–12.5)

## 2023-01-27 PROCEDURE — 93793 PR ANTICOAGULANT MGMT FOR PT TAKING WARFARIN: ICD-10-PCS | Mod: S$GLB,,,

## 2023-01-27 PROCEDURE — 93793 ANTICOAG MGMT PT WARFARIN: CPT | Mod: S$GLB,,,

## 2023-02-09 ENCOUNTER — LAB VISIT (OUTPATIENT)
Dept: LAB | Facility: HOSPITAL | Age: 73
End: 2023-02-09
Attending: INTERNAL MEDICINE
Payer: MEDICARE

## 2023-02-09 DIAGNOSIS — D68.61 ANTI-PHOSPHOLIPID ANTIBODY SYNDROME: Chronic | ICD-10-CM

## 2023-02-09 DIAGNOSIS — Z79.01 LONG TERM CURRENT USE OF ANTICOAGULANT THERAPY: ICD-10-CM

## 2023-02-09 LAB
INR PPP: 2.6 (ref 0.8–1.2)
PROTHROMBIN TIME: 25.5 SEC (ref 9–12.5)

## 2023-02-09 PROCEDURE — 36415 COLL VENOUS BLD VENIPUNCTURE: CPT | Mod: PO | Performed by: INTERNAL MEDICINE

## 2023-02-09 PROCEDURE — 85610 PROTHROMBIN TIME: CPT | Performed by: INTERNAL MEDICINE

## 2023-02-10 ENCOUNTER — ANTI-COAG VISIT (OUTPATIENT)
Dept: CARDIOLOGY | Facility: CLINIC | Age: 73
End: 2023-02-10
Payer: COMMERCIAL

## 2023-02-10 DIAGNOSIS — Z79.01 LONG TERM CURRENT USE OF ANTICOAGULANT THERAPY: ICD-10-CM

## 2023-02-10 DIAGNOSIS — M32.19 OTHER SYSTEMIC LUPUS ERYTHEMATOSUS WITH OTHER ORGAN INVOLVEMENT: Primary | ICD-10-CM

## 2023-02-10 DIAGNOSIS — D68.61 ANTI-PHOSPHOLIPID ANTIBODY SYNDROME: Chronic | ICD-10-CM

## 2023-02-10 PROCEDURE — 93793 ANTICOAG MGMT PT WARFARIN: CPT | Mod: S$GLB,,,

## 2023-02-10 PROCEDURE — 93793 PR ANTICOAGULANT MGMT FOR PT TAKING WARFARIN: ICD-10-PCS | Mod: S$GLB,,,

## 2023-03-02 ENCOUNTER — LAB VISIT (OUTPATIENT)
Dept: LAB | Facility: HOSPITAL | Age: 73
End: 2023-03-02
Attending: INTERNAL MEDICINE
Payer: COMMERCIAL

## 2023-03-02 DIAGNOSIS — Z79.01 LONG TERM CURRENT USE OF ANTICOAGULANT THERAPY: ICD-10-CM

## 2023-03-02 DIAGNOSIS — D68.61 ANTI-PHOSPHOLIPID ANTIBODY SYNDROME: Chronic | ICD-10-CM

## 2023-03-02 PROCEDURE — 85610 PROTHROMBIN TIME: CPT | Performed by: INTERNAL MEDICINE

## 2023-03-02 PROCEDURE — 36415 COLL VENOUS BLD VENIPUNCTURE: CPT | Mod: PO | Performed by: INTERNAL MEDICINE

## 2023-03-03 ENCOUNTER — ANTI-COAG VISIT (OUTPATIENT)
Dept: CARDIOLOGY | Facility: CLINIC | Age: 73
End: 2023-03-03
Payer: COMMERCIAL

## 2023-03-03 DIAGNOSIS — M32.19 OTHER SYSTEMIC LUPUS ERYTHEMATOSUS WITH OTHER ORGAN INVOLVEMENT: Primary | ICD-10-CM

## 2023-03-03 DIAGNOSIS — Z79.01 LONG TERM CURRENT USE OF ANTICOAGULANT THERAPY: ICD-10-CM

## 2023-03-03 DIAGNOSIS — D68.61 ANTI-PHOSPHOLIPID ANTIBODY SYNDROME: Chronic | ICD-10-CM

## 2023-03-03 LAB
INR PPP: 3 (ref 0.8–1.2)
PROTHROMBIN TIME: 29.7 SEC (ref 9–12.5)

## 2023-03-03 PROCEDURE — 93793 ANTICOAG MGMT PT WARFARIN: CPT | Mod: S$GLB,,,

## 2023-03-03 PROCEDURE — 93793 PR ANTICOAGULANT MGMT FOR PT TAKING WARFARIN: ICD-10-PCS | Mod: S$GLB,,,

## 2023-03-23 ENCOUNTER — LAB VISIT (OUTPATIENT)
Dept: LAB | Facility: HOSPITAL | Age: 73
End: 2023-03-23
Attending: INTERNAL MEDICINE
Payer: COMMERCIAL

## 2023-03-23 DIAGNOSIS — M32.19 OTHER SYSTEMIC LUPUS ERYTHEMATOSUS WITH OTHER ORGAN INVOLVEMENT: ICD-10-CM

## 2023-03-23 DIAGNOSIS — Z79.01 LONG TERM CURRENT USE OF ANTICOAGULANT THERAPY: ICD-10-CM

## 2023-03-23 DIAGNOSIS — D68.61 ANTI-PHOSPHOLIPID ANTIBODY SYNDROME: Chronic | ICD-10-CM

## 2023-03-23 LAB
INR PPP: 2.3 (ref 0.8–1.2)
PROTHROMBIN TIME: 22.6 SEC (ref 9–12.5)

## 2023-03-23 PROCEDURE — 85610 PROTHROMBIN TIME: CPT | Performed by: INTERNAL MEDICINE

## 2023-03-23 PROCEDURE — 36415 COLL VENOUS BLD VENIPUNCTURE: CPT | Mod: PO | Performed by: INTERNAL MEDICINE

## 2023-03-24 ENCOUNTER — ANTI-COAG VISIT (OUTPATIENT)
Dept: CARDIOLOGY | Facility: CLINIC | Age: 73
End: 2023-03-24
Payer: COMMERCIAL

## 2023-03-24 DIAGNOSIS — M32.19 OTHER SYSTEMIC LUPUS ERYTHEMATOSUS WITH OTHER ORGAN INVOLVEMENT: Primary | ICD-10-CM

## 2023-03-24 DIAGNOSIS — Z79.01 LONG TERM CURRENT USE OF ANTICOAGULANT THERAPY: ICD-10-CM

## 2023-03-24 DIAGNOSIS — D68.61 ANTI-PHOSPHOLIPID ANTIBODY SYNDROME: Chronic | ICD-10-CM

## 2023-03-24 PROCEDURE — 93793 PR ANTICOAGULANT MGMT FOR PT TAKING WARFARIN: ICD-10-PCS | Mod: S$GLB,,,

## 2023-03-24 PROCEDURE — 93793 ANTICOAG MGMT PT WARFARIN: CPT | Mod: S$GLB,,,

## 2023-04-10 ENCOUNTER — LAB VISIT (OUTPATIENT)
Dept: LAB | Facility: HOSPITAL | Age: 73
End: 2023-04-10
Attending: INTERNAL MEDICINE
Payer: MEDICARE

## 2023-04-10 DIAGNOSIS — M32.19 OTHER SYSTEMIC LUPUS ERYTHEMATOSUS WITH OTHER ORGAN INVOLVEMENT: ICD-10-CM

## 2023-04-10 DIAGNOSIS — D68.61 ANTI-PHOSPHOLIPID ANTIBODY SYNDROME: Chronic | ICD-10-CM

## 2023-04-10 DIAGNOSIS — Z79.01 LONG TERM CURRENT USE OF ANTICOAGULANT THERAPY: ICD-10-CM

## 2023-04-10 LAB
INR PPP: 2.5 (ref 0.8–1.2)
PROTHROMBIN TIME: 24.4 SEC (ref 9–12.5)

## 2023-04-10 PROCEDURE — 85610 PROTHROMBIN TIME: CPT | Performed by: INTERNAL MEDICINE

## 2023-04-10 PROCEDURE — 36415 COLL VENOUS BLD VENIPUNCTURE: CPT | Mod: PO | Performed by: INTERNAL MEDICINE

## 2023-04-11 ENCOUNTER — ANTI-COAG VISIT (OUTPATIENT)
Dept: CARDIOLOGY | Facility: CLINIC | Age: 73
End: 2023-04-11
Payer: COMMERCIAL

## 2023-04-11 DIAGNOSIS — D68.61 ANTI-PHOSPHOLIPID ANTIBODY SYNDROME: Chronic | ICD-10-CM

## 2023-04-11 DIAGNOSIS — Z79.01 LONG TERM CURRENT USE OF ANTICOAGULANT THERAPY: ICD-10-CM

## 2023-04-11 DIAGNOSIS — M32.19 OTHER SYSTEMIC LUPUS ERYTHEMATOSUS WITH OTHER ORGAN INVOLVEMENT: Primary | ICD-10-CM

## 2023-04-11 PROCEDURE — 93793 PR ANTICOAGULANT MGMT FOR PT TAKING WARFARIN: ICD-10-PCS | Mod: S$GLB,,,

## 2023-04-11 PROCEDURE — 93793 ANTICOAG MGMT PT WARFARIN: CPT | Mod: S$GLB,,,

## 2023-04-24 ENCOUNTER — LAB VISIT (OUTPATIENT)
Dept: LAB | Facility: HOSPITAL | Age: 73
End: 2023-04-24
Attending: INTERNAL MEDICINE
Payer: MEDICARE

## 2023-04-24 DIAGNOSIS — D68.61 ANTI-PHOSPHOLIPID ANTIBODY SYNDROME: Chronic | ICD-10-CM

## 2023-04-24 DIAGNOSIS — Z79.01 LONG TERM CURRENT USE OF ANTICOAGULANT THERAPY: ICD-10-CM

## 2023-04-24 DIAGNOSIS — M32.19 OTHER SYSTEMIC LUPUS ERYTHEMATOSUS WITH OTHER ORGAN INVOLVEMENT: ICD-10-CM

## 2023-04-24 LAB
INR PPP: 4.4 (ref 0.8–1.2)
PROTHROMBIN TIME: 42.5 SEC (ref 9–12.5)

## 2023-04-24 PROCEDURE — 36415 COLL VENOUS BLD VENIPUNCTURE: CPT | Mod: PO | Performed by: INTERNAL MEDICINE

## 2023-04-24 PROCEDURE — 85610 PROTHROMBIN TIME: CPT | Performed by: INTERNAL MEDICINE

## 2023-04-25 ENCOUNTER — ANTI-COAG VISIT (OUTPATIENT)
Dept: CARDIOLOGY | Facility: CLINIC | Age: 73
End: 2023-04-25
Payer: MEDICARE

## 2023-04-25 DIAGNOSIS — Z79.01 LONG TERM CURRENT USE OF ANTICOAGULANT THERAPY: ICD-10-CM

## 2023-04-25 DIAGNOSIS — M32.19 OTHER SYSTEMIC LUPUS ERYTHEMATOSUS WITH OTHER ORGAN INVOLVEMENT: ICD-10-CM

## 2023-04-25 DIAGNOSIS — D68.61 ANTI-PHOSPHOLIPID ANTIBODY SYNDROME: Primary | Chronic | ICD-10-CM

## 2023-04-25 PROCEDURE — 93793 PR ANTICOAGULANT MGMT FOR PT TAKING WARFARIN: ICD-10-PCS | Mod: ,,,

## 2023-04-25 PROCEDURE — 93793 ANTICOAG MGMT PT WARFARIN: CPT | Mod: ,,,

## 2023-04-25 NOTE — PROGRESS NOTES
INR not at goal. Medications, chart, and patient findings reviewed. Patient reports the following changes since their last visit: recent alcohol intake, which is likely contributing to supratherapeutic INR. See calendar for adjustments to dose and follow up plan.

## 2023-05-01 ENCOUNTER — LAB VISIT (OUTPATIENT)
Dept: LAB | Facility: HOSPITAL | Age: 73
End: 2023-05-01
Attending: INTERNAL MEDICINE
Payer: MEDICARE

## 2023-05-01 DIAGNOSIS — M32.19 OTHER SYSTEMIC LUPUS ERYTHEMATOSUS WITH OTHER ORGAN INVOLVEMENT: ICD-10-CM

## 2023-05-01 DIAGNOSIS — D68.61 ANTI-PHOSPHOLIPID ANTIBODY SYNDROME: Chronic | ICD-10-CM

## 2023-05-01 DIAGNOSIS — Z79.01 LONG TERM CURRENT USE OF ANTICOAGULANT THERAPY: ICD-10-CM

## 2023-05-01 LAB
INR PPP: 2.9 (ref 0.8–1.2)
PROTHROMBIN TIME: 28.8 SEC (ref 9–12.5)

## 2023-05-01 PROCEDURE — 85610 PROTHROMBIN TIME: CPT | Performed by: INTERNAL MEDICINE

## 2023-05-01 PROCEDURE — 36415 COLL VENOUS BLD VENIPUNCTURE: CPT | Mod: PO | Performed by: INTERNAL MEDICINE

## 2023-05-02 ENCOUNTER — ANTI-COAG VISIT (OUTPATIENT)
Dept: CARDIOLOGY | Facility: CLINIC | Age: 73
End: 2023-05-02
Payer: MEDICARE

## 2023-05-02 DIAGNOSIS — D68.61 ANTI-PHOSPHOLIPID ANTIBODY SYNDROME: Primary | Chronic | ICD-10-CM

## 2023-05-02 DIAGNOSIS — Z79.01 LONG TERM CURRENT USE OF ANTICOAGULANT THERAPY: ICD-10-CM

## 2023-05-02 DIAGNOSIS — M32.19 OTHER SYSTEMIC LUPUS ERYTHEMATOSUS WITH OTHER ORGAN INVOLVEMENT: ICD-10-CM

## 2023-05-02 PROCEDURE — 93793 ANTICOAG MGMT PT WARFARIN: CPT | Mod: ,,,

## 2023-05-02 PROCEDURE — 93793 PR ANTICOAGULANT MGMT FOR PT TAKING WARFARIN: ICD-10-PCS | Mod: ,,,

## 2023-05-15 ENCOUNTER — LAB VISIT (OUTPATIENT)
Dept: LAB | Facility: HOSPITAL | Age: 73
End: 2023-05-15
Attending: INTERNAL MEDICINE
Payer: MEDICARE

## 2023-05-15 ENCOUNTER — ANTI-COAG VISIT (OUTPATIENT)
Dept: CARDIOLOGY | Facility: CLINIC | Age: 73
End: 2023-05-15
Payer: COMMERCIAL

## 2023-05-15 DIAGNOSIS — Z79.01 LONG TERM CURRENT USE OF ANTICOAGULANT THERAPY: ICD-10-CM

## 2023-05-15 DIAGNOSIS — M32.19 OTHER SYSTEMIC LUPUS ERYTHEMATOSUS WITH OTHER ORGAN INVOLVEMENT: ICD-10-CM

## 2023-05-15 DIAGNOSIS — D68.61 ANTI-PHOSPHOLIPID ANTIBODY SYNDROME: Chronic | ICD-10-CM

## 2023-05-15 DIAGNOSIS — D68.61 ANTI-PHOSPHOLIPID ANTIBODY SYNDROME: Primary | Chronic | ICD-10-CM

## 2023-05-15 LAB
INR PPP: 3.5 (ref 0.8–1.2)
PROTHROMBIN TIME: 35.2 SEC (ref 9–12.5)

## 2023-05-15 PROCEDURE — 93793 ANTICOAG MGMT PT WARFARIN: CPT | Mod: ,,,

## 2023-05-15 PROCEDURE — 85610 PROTHROMBIN TIME: CPT | Performed by: INTERNAL MEDICINE

## 2023-05-15 PROCEDURE — 93793 PR ANTICOAGULANT MGMT FOR PT TAKING WARFARIN: ICD-10-PCS | Mod: ,,,

## 2023-05-15 PROCEDURE — 36415 COLL VENOUS BLD VENIPUNCTURE: CPT | Mod: PO | Performed by: INTERNAL MEDICINE

## 2023-05-29 ENCOUNTER — LAB VISIT (OUTPATIENT)
Dept: LAB | Facility: HOSPITAL | Age: 73
End: 2023-05-29
Attending: INTERNAL MEDICINE
Payer: COMMERCIAL

## 2023-05-29 ENCOUNTER — ANTI-COAG VISIT (OUTPATIENT)
Dept: CARDIOLOGY | Facility: CLINIC | Age: 73
End: 2023-05-29
Payer: MEDICARE

## 2023-05-29 DIAGNOSIS — M32.19 OTHER SYSTEMIC LUPUS ERYTHEMATOSUS WITH OTHER ORGAN INVOLVEMENT: ICD-10-CM

## 2023-05-29 DIAGNOSIS — Z79.01 LONG TERM CURRENT USE OF ANTICOAGULANT THERAPY: ICD-10-CM

## 2023-05-29 DIAGNOSIS — D68.61 ANTI-PHOSPHOLIPID ANTIBODY SYNDROME: Primary | Chronic | ICD-10-CM

## 2023-05-29 DIAGNOSIS — D68.61 ANTI-PHOSPHOLIPID ANTIBODY SYNDROME: Chronic | ICD-10-CM

## 2023-05-29 LAB
INR PPP: 3.5 (ref 0.8–1.2)
PROTHROMBIN TIME: 34.9 SEC (ref 9–12.5)

## 2023-05-29 PROCEDURE — 93793 PR ANTICOAGULANT MGMT FOR PT TAKING WARFARIN: ICD-10-PCS | Mod: ,,,

## 2023-05-29 PROCEDURE — 93793 ANTICOAG MGMT PT WARFARIN: CPT | Mod: ,,,

## 2023-05-29 PROCEDURE — 85610 PROTHROMBIN TIME: CPT | Performed by: INTERNAL MEDICINE

## 2023-05-29 PROCEDURE — 36415 COLL VENOUS BLD VENIPUNCTURE: CPT | Mod: PO | Performed by: INTERNAL MEDICINE

## 2023-05-31 DIAGNOSIS — M17.30 POST-TRAUMATIC OSTEOARTHRITIS OF KNEE, UNSPECIFIED LATERALITY: ICD-10-CM

## 2023-06-03 RX ORDER — CELECOXIB 200 MG/1
CAPSULE ORAL
Qty: 20 CAPSULE | Refills: 0 | Status: SHIPPED | OUTPATIENT
Start: 2023-06-03 | End: 2023-06-23

## 2023-06-13 DIAGNOSIS — M32.19 OTHER SYSTEMIC LUPUS ERYTHEMATOSUS WITH OTHER ORGAN INVOLVEMENT: ICD-10-CM

## 2023-06-14 RX ORDER — HYDROXYCHLOROQUINE SULFATE 200 MG/1
TABLET, FILM COATED ORAL
Qty: 180 TABLET | Refills: 1 | Status: SHIPPED | OUTPATIENT
Start: 2023-06-14 | End: 2023-11-30

## 2023-06-19 ENCOUNTER — LAB VISIT (OUTPATIENT)
Dept: LAB | Facility: HOSPITAL | Age: 73
End: 2023-06-19
Attending: INTERNAL MEDICINE
Payer: MEDICARE

## 2023-06-19 DIAGNOSIS — Z79.01 LONG TERM CURRENT USE OF ANTICOAGULANT THERAPY: ICD-10-CM

## 2023-06-19 DIAGNOSIS — D68.61 ANTI-PHOSPHOLIPID ANTIBODY SYNDROME: Chronic | ICD-10-CM

## 2023-06-19 DIAGNOSIS — M32.19 OTHER SYSTEMIC LUPUS ERYTHEMATOSUS WITH OTHER ORGAN INVOLVEMENT: ICD-10-CM

## 2023-06-19 LAB
INR PPP: 4.9 (ref 0.8–1.2)
PROTHROMBIN TIME: 47.3 SEC (ref 9–12.5)

## 2023-06-19 PROCEDURE — 85610 PROTHROMBIN TIME: CPT | Performed by: INTERNAL MEDICINE

## 2023-06-19 PROCEDURE — 36415 COLL VENOUS BLD VENIPUNCTURE: CPT | Mod: PO | Performed by: INTERNAL MEDICINE

## 2023-06-20 ENCOUNTER — ANTI-COAG VISIT (OUTPATIENT)
Dept: CARDIOLOGY | Facility: CLINIC | Age: 73
End: 2023-06-20
Payer: MEDICARE

## 2023-06-20 DIAGNOSIS — M32.19 OTHER SYSTEMIC LUPUS ERYTHEMATOSUS WITH OTHER ORGAN INVOLVEMENT: Primary | ICD-10-CM

## 2023-06-20 DIAGNOSIS — Z79.01 LONG TERM CURRENT USE OF ANTICOAGULANT THERAPY: ICD-10-CM

## 2023-06-20 DIAGNOSIS — D68.61 ANTI-PHOSPHOLIPID ANTIBODY SYNDROME: Chronic | ICD-10-CM

## 2023-06-20 PROCEDURE — 93793 PR ANTICOAGULANT MGMT FOR PT TAKING WARFARIN: ICD-10-PCS | Mod: S$GLB,,,

## 2023-06-20 PROCEDURE — 93793 ANTICOAG MGMT PT WARFARIN: CPT | Mod: S$GLB,,,

## 2023-06-22 ENCOUNTER — LAB VISIT (OUTPATIENT)
Dept: LAB | Facility: HOSPITAL | Age: 73
End: 2023-06-22
Attending: INTERNAL MEDICINE
Payer: MEDICARE

## 2023-06-22 ENCOUNTER — ANTI-COAG VISIT (OUTPATIENT)
Dept: CARDIOLOGY | Facility: CLINIC | Age: 73
End: 2023-06-22
Payer: MEDICARE

## 2023-06-22 DIAGNOSIS — M17.30 POST-TRAUMATIC OSTEOARTHRITIS OF KNEE, UNSPECIFIED LATERALITY: ICD-10-CM

## 2023-06-22 DIAGNOSIS — M32.19 OTHER SYSTEMIC LUPUS ERYTHEMATOSUS WITH OTHER ORGAN INVOLVEMENT: ICD-10-CM

## 2023-06-22 DIAGNOSIS — Z79.01 LONG TERM CURRENT USE OF ANTICOAGULANT THERAPY: ICD-10-CM

## 2023-06-22 DIAGNOSIS — D68.61 ANTI-PHOSPHOLIPID ANTIBODY SYNDROME: Primary | Chronic | ICD-10-CM

## 2023-06-22 DIAGNOSIS — D68.61 ANTI-PHOSPHOLIPID ANTIBODY SYNDROME: Chronic | ICD-10-CM

## 2023-06-22 LAB
INR PPP: 1.8 (ref 0.8–1.2)
PROTHROMBIN TIME: 18.6 SEC (ref 9–12.5)

## 2023-06-22 PROCEDURE — 93793 PR ANTICOAGULANT MGMT FOR PT TAKING WARFARIN: ICD-10-PCS | Mod: S$GLB,,,

## 2023-06-22 PROCEDURE — 85610 PROTHROMBIN TIME: CPT | Performed by: INTERNAL MEDICINE

## 2023-06-22 PROCEDURE — 36415 COLL VENOUS BLD VENIPUNCTURE: CPT | Mod: PO | Performed by: INTERNAL MEDICINE

## 2023-06-22 PROCEDURE — 93793 ANTICOAG MGMT PT WARFARIN: CPT | Mod: S$GLB,,,

## 2023-06-23 RX ORDER — CELECOXIB 200 MG/1
CAPSULE ORAL
Qty: 20 CAPSULE | Refills: 0 | Status: SHIPPED | OUTPATIENT
Start: 2023-06-23 | End: 2024-02-18 | Stop reason: SDUPTHER

## 2023-06-29 ENCOUNTER — LAB VISIT (OUTPATIENT)
Dept: LAB | Facility: HOSPITAL | Age: 73
End: 2023-06-29
Attending: INTERNAL MEDICINE
Payer: MEDICARE

## 2023-06-29 DIAGNOSIS — Z79.01 LONG TERM CURRENT USE OF ANTICOAGULANT THERAPY: ICD-10-CM

## 2023-06-29 DIAGNOSIS — D68.61 ANTI-PHOSPHOLIPID ANTIBODY SYNDROME: Chronic | ICD-10-CM

## 2023-06-29 DIAGNOSIS — M32.19 OTHER SYSTEMIC LUPUS ERYTHEMATOSUS WITH OTHER ORGAN INVOLVEMENT: ICD-10-CM

## 2023-06-29 LAB
INR PPP: 3.2 (ref 0.8–1.2)
PROTHROMBIN TIME: 31.7 SEC (ref 9–12.5)

## 2023-06-29 PROCEDURE — 36415 COLL VENOUS BLD VENIPUNCTURE: CPT | Mod: PO | Performed by: INTERNAL MEDICINE

## 2023-06-29 PROCEDURE — 85610 PROTHROMBIN TIME: CPT | Performed by: INTERNAL MEDICINE

## 2023-06-30 ENCOUNTER — ANTI-COAG VISIT (OUTPATIENT)
Dept: CARDIOLOGY | Facility: CLINIC | Age: 73
End: 2023-06-30
Payer: MEDICARE

## 2023-06-30 DIAGNOSIS — D68.61 ANTI-PHOSPHOLIPID ANTIBODY SYNDROME: Chronic | ICD-10-CM

## 2023-06-30 DIAGNOSIS — Z79.01 LONG TERM CURRENT USE OF ANTICOAGULANT THERAPY: ICD-10-CM

## 2023-06-30 DIAGNOSIS — M32.19 OTHER SYSTEMIC LUPUS ERYTHEMATOSUS WITH OTHER ORGAN INVOLVEMENT: Primary | ICD-10-CM

## 2023-06-30 PROCEDURE — 93793 PR ANTICOAGULANT MGMT FOR PT TAKING WARFARIN: ICD-10-PCS | Mod: S$GLB,,,

## 2023-06-30 PROCEDURE — 93793 ANTICOAG MGMT PT WARFARIN: CPT | Mod: S$GLB,,,

## 2023-07-13 ENCOUNTER — LAB VISIT (OUTPATIENT)
Dept: LAB | Facility: HOSPITAL | Age: 73
End: 2023-07-13
Attending: INTERNAL MEDICINE
Payer: MEDICARE

## 2023-07-13 DIAGNOSIS — M32.19 OTHER SYSTEMIC LUPUS ERYTHEMATOSUS WITH OTHER ORGAN INVOLVEMENT: ICD-10-CM

## 2023-07-13 DIAGNOSIS — D68.61 ANTI-PHOSPHOLIPID ANTIBODY SYNDROME: Chronic | ICD-10-CM

## 2023-07-13 DIAGNOSIS — Z79.01 LONG TERM CURRENT USE OF ANTICOAGULANT THERAPY: ICD-10-CM

## 2023-07-13 LAB
INR PPP: 4.5 (ref 0.8–1.2)
PROTHROMBIN TIME: 44.2 SEC (ref 9–12.5)

## 2023-07-13 PROCEDURE — 36415 COLL VENOUS BLD VENIPUNCTURE: CPT | Mod: PO | Performed by: INTERNAL MEDICINE

## 2023-07-13 PROCEDURE — 85610 PROTHROMBIN TIME: CPT | Performed by: INTERNAL MEDICINE

## 2023-07-14 ENCOUNTER — ANTI-COAG VISIT (OUTPATIENT)
Dept: CARDIOLOGY | Facility: CLINIC | Age: 73
End: 2023-07-14
Payer: MEDICARE

## 2023-07-14 DIAGNOSIS — M32.19 OTHER SYSTEMIC LUPUS ERYTHEMATOSUS WITH OTHER ORGAN INVOLVEMENT: ICD-10-CM

## 2023-07-14 DIAGNOSIS — Z79.01 LONG TERM CURRENT USE OF ANTICOAGULANT THERAPY: ICD-10-CM

## 2023-07-14 DIAGNOSIS — D68.61 ANTI-PHOSPHOLIPID ANTIBODY SYNDROME: Primary | Chronic | ICD-10-CM

## 2023-07-14 PROCEDURE — 93793 PR ANTICOAGULANT MGMT FOR PT TAKING WARFARIN: ICD-10-PCS | Mod: S$GLB,,,

## 2023-07-14 PROCEDURE — 93793 ANTICOAG MGMT PT WARFARIN: CPT | Mod: S$GLB,,,

## 2023-07-14 NOTE — PROGRESS NOTES
[facility]    2023 9:06 AM    Assessment/Plan:    Patient presents today with supratherapeutic INR.    Assessment of patient findings and chart review: Reports eating less salad than he usually does    Recommendation for patient's warfarin regimen: Hold dose today then decrease maintenance dose    Recommend repeat INR in 1 week  _________________________________________________________________    Viraj Olsen (72 y.o.) is followed by the [facility].    Anticoagulation Summary  As of 2023      INR goal:  2.5-3.5   TTR:  70.3 % (11 y)   INR used for dosin.5 (2023)   Warfarin maintenance plan:  5 mg (5 mg x 1) every Mon, Wed, Fri; 10 mg (5 mg x 2) all other days   Weekly warfarin total:  55 mg   Plan last modified:  Camelia Christopher, PharmD (2023)   Next INR check:  2023   Target end date:      Indications    Other forms of systemic lupus erythematosus [M32.8]  Impending cerebrovascular accident (Resolved) [I63.9]  Anti-phospholipid antibody syndrome [D68.61]  Long term current use of anticoagulant therapy [Z79.01]                 Anticoagulation Episode Summary       INR check location:  Clinic Lab    Preferred lab:  [facility]    Send INR reminders to:  [facility] COUMADIN MONITORING POOL    Comments:  [facility] only Mon - Thu (INRs ) // Antiphospholipid - must go to lab          Anticoagulation Care Providers       Provider Role Specialty Phone number    Preet Quintero MD Responsible Rheumatology [phone]            Patient Findings       Positives:  Change in health, Change in diet/appetite    Negatives:  Signs/symptoms of thrombosis, Signs/symptoms of bleeding, Laboratory test error suspected, Change in alcohol use, Change in activity, Upcoming invasive procedure, Emergency department visit, Upcoming dental procedure, Missed doses, Extra  doses, Change in medications, Hospital admission, Bruising, Other complaints    Comments:  Pt confirmed he has been taking 5mg Mon and Wed and 10mg all other days. Pt reports eating less salad than usual, he will resume his regular diet. I advised him to be consistent with diet so that his level does not fluctuate. Pt also reports some sinus discomfort but took Allegra. Pt denies any other changes.

## 2023-07-20 ENCOUNTER — LAB VISIT (OUTPATIENT)
Dept: LAB | Facility: HOSPITAL | Age: 73
End: 2023-07-20
Attending: INTERNAL MEDICINE
Payer: MEDICARE

## 2023-07-20 DIAGNOSIS — D68.61 ANTI-PHOSPHOLIPID ANTIBODY SYNDROME: Chronic | ICD-10-CM

## 2023-07-20 DIAGNOSIS — Z79.01 LONG TERM CURRENT USE OF ANTICOAGULANT THERAPY: ICD-10-CM

## 2023-07-20 DIAGNOSIS — M32.19 OTHER SYSTEMIC LUPUS ERYTHEMATOSUS WITH OTHER ORGAN INVOLVEMENT: ICD-10-CM

## 2023-07-20 LAB
INR PPP: 2.9 (ref 0.8–1.2)
PROTHROMBIN TIME: 29.5 SEC (ref 9–12.5)

## 2023-07-20 PROCEDURE — 36415 COLL VENOUS BLD VENIPUNCTURE: CPT | Mod: HCNC,PO | Performed by: INTERNAL MEDICINE

## 2023-07-20 PROCEDURE — 85610 PROTHROMBIN TIME: CPT | Mod: HCNC | Performed by: INTERNAL MEDICINE

## 2023-07-21 ENCOUNTER — ANTI-COAG VISIT (OUTPATIENT)
Dept: CARDIOLOGY | Facility: CLINIC | Age: 73
End: 2023-07-21
Payer: MEDICARE

## 2023-07-21 DIAGNOSIS — D68.61 ANTI-PHOSPHOLIPID ANTIBODY SYNDROME: Chronic | ICD-10-CM

## 2023-07-21 DIAGNOSIS — Z79.01 LONG TERM CURRENT USE OF ANTICOAGULANT THERAPY: ICD-10-CM

## 2023-07-21 DIAGNOSIS — M32.19 OTHER SYSTEMIC LUPUS ERYTHEMATOSUS WITH OTHER ORGAN INVOLVEMENT: Primary | ICD-10-CM

## 2023-07-21 PROCEDURE — 93793 PR ANTICOAGULANT MGMT FOR PT TAKING WARFARIN: ICD-10-PCS | Mod: S$GLB,,,

## 2023-07-21 PROCEDURE — 93793 ANTICOAG MGMT PT WARFARIN: CPT | Mod: S$GLB,,,

## 2023-07-21 NOTE — PROGRESS NOTES
[email or web address]    2023 8:22 AM    Assessment/Plan:    Patient presents today with therapeutic INR.    Assessment of patient findings and chart review: no significant findings     Recommendation for patient's warfarin regimen: Continue current maintenance dose    Recommend repeat INR in 2 weeks  _________________________________________________________________    Viraj Olsen (72 y.o.) is followed by the [facility].    Anticoagulation Summary  As of 2023      INR goal:  2.5-3.5   TTR:  70.3 % (11 y)   INR used for dosin.9 (2023)   Warfarin maintenance plan:  5 mg (5 mg x 1) every Mon, Wed, Fri; 10 mg (5 mg x 2) all other days   Weekly warfarin total:  55 mg   Plan last modified:  Camelia Christopher, PharmD (2023)   Next INR check:  8/3/2023   Target end date:      Indications    Other forms of systemic lupus erythematosus [M32.8]  Impending cerebrovascular accident (Resolved) [I63.9]  Anti-phospholipid antibody syndrome [D68.61]  Long term current use of anticoagulant therapy [Z79.01]                 Anticoagulation Episode Summary       INR check location:  Clinic Lab    Preferred lab:  [facility]    Send INR reminders to:  [facility] COUMADIN MONITORING POOL    Comments:  [facility] only Mon - Thu (INRs ) // Antiphospholipid - must go to lab          Anticoagulation Care Providers       Provider Role Specialty Phone number    Preet Quintero MD Responsible Rheumatology [phone]

## 2023-08-01 ENCOUNTER — ANTI-COAG VISIT (OUTPATIENT)
Dept: CARDIOLOGY | Facility: CLINIC | Age: 73
End: 2023-08-01
Payer: MEDICARE

## 2023-08-01 ENCOUNTER — LAB VISIT (OUTPATIENT)
Dept: LAB | Facility: HOSPITAL | Age: 73
End: 2023-08-01
Attending: INTERNAL MEDICINE
Payer: MEDICARE

## 2023-08-01 DIAGNOSIS — M32.19 OTHER SYSTEMIC LUPUS ERYTHEMATOSUS WITH OTHER ORGAN INVOLVEMENT: ICD-10-CM

## 2023-08-01 DIAGNOSIS — D68.61 ANTI-PHOSPHOLIPID ANTIBODY SYNDROME: Chronic | ICD-10-CM

## 2023-08-01 DIAGNOSIS — M32.19 OTHER SYSTEMIC LUPUS ERYTHEMATOSUS WITH OTHER ORGAN INVOLVEMENT: Primary | ICD-10-CM

## 2023-08-01 DIAGNOSIS — Z79.01 LONG TERM CURRENT USE OF ANTICOAGULANT THERAPY: ICD-10-CM

## 2023-08-01 LAB
INR PPP: 3.8 (ref 0.8–1.2)
PROTHROMBIN TIME: 37.9 SEC (ref 9–12.5)

## 2023-08-01 PROCEDURE — 36415 COLL VENOUS BLD VENIPUNCTURE: CPT | Mod: HCNC,PO | Performed by: INTERNAL MEDICINE

## 2023-08-01 PROCEDURE — 85610 PROTHROMBIN TIME: CPT | Mod: HCNC | Performed by: INTERNAL MEDICINE

## 2023-08-01 PROCEDURE — 93793 ANTICOAG MGMT PT WARFARIN: CPT | Mod: S$GLB,,,

## 2023-08-01 PROCEDURE — 93793 PR ANTICOAGULANT MGMT FOR PT TAKING WARFARIN: ICD-10-PCS | Mod: S$GLB,,,

## 2023-08-01 NOTE — PROGRESS NOTES
INR not at goal. Medications, chart, and patient findings reviewed. See calendar for adjustments to dose and follow up plan.  Pt continues to be supratherapeutic and indicates no changes.  Recommend to decrease maintenance dose at this time & continue to monitor closely.

## 2023-08-15 ENCOUNTER — ANTI-COAG VISIT (OUTPATIENT)
Dept: CARDIOLOGY | Facility: CLINIC | Age: 73
End: 2023-08-15
Payer: MEDICARE

## 2023-08-15 ENCOUNTER — LAB VISIT (OUTPATIENT)
Dept: LAB | Facility: HOSPITAL | Age: 73
End: 2023-08-15
Attending: INTERNAL MEDICINE
Payer: MEDICARE

## 2023-08-15 DIAGNOSIS — M32.19 OTHER SYSTEMIC LUPUS ERYTHEMATOSUS WITH OTHER ORGAN INVOLVEMENT: ICD-10-CM

## 2023-08-15 DIAGNOSIS — Z79.01 LONG TERM CURRENT USE OF ANTICOAGULANT THERAPY: ICD-10-CM

## 2023-08-15 DIAGNOSIS — D68.61 ANTI-PHOSPHOLIPID ANTIBODY SYNDROME: Chronic | ICD-10-CM

## 2023-08-15 DIAGNOSIS — D68.61 ANTI-PHOSPHOLIPID ANTIBODY SYNDROME: Primary | Chronic | ICD-10-CM

## 2023-08-15 LAB
INR PPP: 2.3 (ref 0.8–1.2)
PROTHROMBIN TIME: 23.8 SEC (ref 9–12.5)

## 2023-08-15 PROCEDURE — 36415 COLL VENOUS BLD VENIPUNCTURE: CPT | Mod: HCNC,PO | Performed by: INTERNAL MEDICINE

## 2023-08-15 PROCEDURE — 85610 PROTHROMBIN TIME: CPT | Mod: HCNC | Performed by: INTERNAL MEDICINE

## 2023-08-15 PROCEDURE — 93793 ANTICOAG MGMT PT WARFARIN: CPT | Mod: S$GLB,,,

## 2023-08-15 PROCEDURE — 93793 PR ANTICOAGULANT MGMT FOR PT TAKING WARFARIN: ICD-10-PCS | Mod: S$GLB,,,

## 2023-08-15 NOTE — PROGRESS NOTES
[facility]    08/15/2023 4:23 PM    Assessment/Plan:    Patient presents today with subtherapeutic  INR.    Assessment of patient findings and chart review: no significant findings     Recommendation for patient's warfarin regimen: Boost dose today to 15mg then resume current maintenance dose    Recommend repeat INR in 2 weeks  _________________________________________________________________    Viraj [place] (72 y.o.) is followed by the [facility].    Anticoagulation Summary  As of 8/15/2023      INR goal:  2.5-3.5   TTR:  70.3 % (11 y)   INR used for dosin.3 (8/15/2023)   Warfarin maintenance plan:  10 mg (5 mg x 2) every Tue, Thu, Sat; 5 mg (5 mg x 1) all other days   Weekly warfarin total:  50 mg   Plan last modified:  Octavio Beach, PharmD (2023)   Next INR check:  2023   Target end date:      Indications    Other forms of systemic lupus erythematosus [M32.8]  Impending cerebrovascular accident (Resolved) [I63.9]  Anti-phospholipid antibody syndrome [D68.61]  Long term current use of anticoagulant therapy [Z79.01]                 Anticoagulation Episode Summary       INR check location:  Clinic Lab    Preferred lab:  [facility]    Send INR reminders to:  [facility] COUMADIN MONITORING POOL    Comments:  [facility] only Mon - Thu (INRs ) // Antiphospholipid - must go to lab          Anticoagulation Care Providers       Provider Role Specialty Phone number    Preet Quintero MD Responsible Rheumatology [phone]

## 2023-08-30 ENCOUNTER — ANTI-COAG VISIT (OUTPATIENT)
Dept: CARDIOLOGY | Facility: CLINIC | Age: 73
End: 2023-08-30
Payer: MEDICARE

## 2023-08-30 DIAGNOSIS — D68.61 ANTI-PHOSPHOLIPID ANTIBODY SYNDROME: Chronic | ICD-10-CM

## 2023-08-30 DIAGNOSIS — M32.19 OTHER SYSTEMIC LUPUS ERYTHEMATOSUS WITH OTHER ORGAN INVOLVEMENT: Primary | ICD-10-CM

## 2023-08-30 DIAGNOSIS — Z79.01 LONG TERM CURRENT USE OF ANTICOAGULANT THERAPY: ICD-10-CM

## 2023-08-30 PROCEDURE — 93793 PR ANTICOAGULANT MGMT FOR PT TAKING WARFARIN: ICD-10-PCS | Mod: S$GLB,,,

## 2023-08-30 PROCEDURE — 93793 ANTICOAG MGMT PT WARFARIN: CPT | Mod: S$GLB,,,

## 2023-08-30 NOTE — PROGRESS NOTES
[facility]    2023 9:49 AM    Assessment/Plan:    Patient presents today with supratherapeutic INR.    Assessment of patient findings and chart review: Reports taking higher dose than advised    Recommendation for patient's warfarin regimen: Lower dose today to 2.5mg then resume current maintenance dose    Recommend repeat INR in 2 weeks  _________________________________________________________________    Viraj Olsen (72 y.o.) is followed by the [facility].    Anticoagulation Summary  As of 2023      INR goal:  2.5-3.5   TTR:  70.2 % (11.1 y)   INR used for dosin.0 (2023)   Warfarin maintenance plan:  10 mg (5 mg x 2) every Tue, Thu, Sat; 5 mg (5 mg x 1) all other days   Weekly warfarin total:  50 mg   Plan last modified:  Octavio Beach, PharmD (2023)   Next INR check:  2023   Target end date:      Indications    Other forms of systemic lupus erythematosus [M32.8]  Impending cerebrovascular accident (Resolved) [I63.9]  Anti-phospholipid antibody syndrome [D68.61]  Long term current use of anticoagulant therapy [Z79.01]                 Anticoagulation Episode Summary       INR check location:  Clinic Lab    Preferred lab:  [facility]    Send INR reminders to:  [facility] COUMADIN MONITORING POOL    Comments:  [facility] only Mon - Thu (INRs ) // Antiphospholipid - must go to lab          Anticoagulation Care Providers       Provider Role Specialty Phone number    Preet Quintero MD Responsible Rheumatology [phone]            Patient Findings       Positives:  Extra doses    Negatives:  Signs/symptoms of thrombosis, Signs/symptoms of bleeding, Laboratory test error suspected, Change in health, Change in alcohol use, Change in activity, Upcoming invasive procedure, Emergency department visit, Upcoming dental procedure, Missed doses, Change in  medications, Change in diet/appetite, Hospital admission, Bruising, Other complaints    Comments:  Confirmed taking 5mg MWF; 10mg all other changes  No other changes

## 2023-09-11 ENCOUNTER — OFFICE VISIT (OUTPATIENT)
Dept: DERMATOLOGY | Facility: CLINIC | Age: 73
End: 2023-09-11
Payer: MEDICARE

## 2023-09-11 ENCOUNTER — LAB VISIT (OUTPATIENT)
Dept: LAB | Facility: HOSPITAL | Age: 73
End: 2023-09-11
Payer: MEDICARE

## 2023-09-11 DIAGNOSIS — D22.9 BENIGN NEVUS: ICD-10-CM

## 2023-09-11 DIAGNOSIS — D48.5 NEOPLASM OF UNCERTAIN BEHAVIOR OF SKIN: Primary | ICD-10-CM

## 2023-09-11 DIAGNOSIS — L81.4 LENTIGO: ICD-10-CM

## 2023-09-11 DIAGNOSIS — L82.1 SK (SEBORRHEIC KERATOSIS): ICD-10-CM

## 2023-09-11 DIAGNOSIS — M32.19 OTHER SYSTEMIC LUPUS ERYTHEMATOSUS WITH OTHER ORGAN INVOLVEMENT: Chronic | ICD-10-CM

## 2023-09-11 DIAGNOSIS — Z12.83 SCREENING EXAM FOR SKIN CANCER: ICD-10-CM

## 2023-09-11 DIAGNOSIS — L57.0 AK (ACTINIC KERATOSIS): ICD-10-CM

## 2023-09-11 LAB
CREAT UR-MCNC: 103 MG/DL (ref 23–375)
PROT UR-MCNC: <7 MG/DL (ref 0–15)
PROT/CREAT UR: NORMAL MG/G{CREAT} (ref 0–0.2)

## 2023-09-11 PROCEDURE — 17003 DESTRUCTION, PREMALIGNANT LESIONS; SECOND THROUGH 14 LESIONS: ICD-10-PCS | Mod: HCNC,S$GLB,, | Performed by: DERMATOLOGY

## 2023-09-11 PROCEDURE — 1160F PR REVIEW ALL MEDS BY PRESCRIBER/CLIN PHARMACIST DOCUMENTED: ICD-10-PCS | Mod: HCNC,CPTII,S$GLB, | Performed by: DERMATOLOGY

## 2023-09-11 PROCEDURE — 99203 PR OFFICE/OUTPT VISIT, NEW, LEVL III, 30-44 MIN: ICD-10-PCS | Mod: 25,HCNC,S$GLB, | Performed by: DERMATOLOGY

## 2023-09-11 PROCEDURE — 17000 DESTRUCT PREMALG LESION: CPT | Mod: HCNC,XS,S$GLB, | Performed by: DERMATOLOGY

## 2023-09-11 PROCEDURE — 88305 TISSUE EXAM BY PATHOLOGIST: CPT | Mod: 26,HCNC,, | Performed by: PATHOLOGY

## 2023-09-11 PROCEDURE — 88305 TISSUE EXAM BY PATHOLOGIST: CPT | Mod: 59,HCNC | Performed by: PATHOLOGY

## 2023-09-11 PROCEDURE — 88342 IMHCHEM/IMCYTCHM 1ST ANTB: CPT | Mod: 91,HCNC | Performed by: PATHOLOGY

## 2023-09-11 PROCEDURE — 17000 PR DESTRUCTION(LASER SURGERY,CRYOSURGERY,CHEMOSURGERY),PREMALIGNANT LESIONS,FIRST LESION: ICD-10-PCS | Mod: HCNC,XS,S$GLB, | Performed by: DERMATOLOGY

## 2023-09-11 PROCEDURE — 99999 PR PBB SHADOW E&M-EST. PATIENT-LVL IV: CPT | Mod: PBBFAC,HCNC,, | Performed by: DERMATOLOGY

## 2023-09-11 PROCEDURE — 1126F AMNT PAIN NOTED NONE PRSNT: CPT | Mod: HCNC,CPTII,S$GLB, | Performed by: DERMATOLOGY

## 2023-09-11 PROCEDURE — 88341 PR IHC OR ICC EACH ADD'L SINGLE ANTIBODY  STAINPR: ICD-10-PCS | Mod: 26,HCNC,, | Performed by: PATHOLOGY

## 2023-09-11 PROCEDURE — 11102 PR TANGENTIAL BIOPSY, SKIN, SINGLE LESION: ICD-10-PCS | Mod: HCNC,S$GLB,, | Performed by: DERMATOLOGY

## 2023-09-11 PROCEDURE — 88341 IMHCHEM/IMCYTCHM EA ADD ANTB: CPT | Mod: 26,HCNC,, | Performed by: PATHOLOGY

## 2023-09-11 PROCEDURE — 88305 TISSUE EXAM BY PATHOLOGIST: ICD-10-PCS | Mod: 26,HCNC,, | Performed by: PATHOLOGY

## 2023-09-11 PROCEDURE — 99999 PR PBB SHADOW E&M-EST. PATIENT-LVL IV: ICD-10-PCS | Mod: PBBFAC,HCNC,, | Performed by: DERMATOLOGY

## 2023-09-11 PROCEDURE — 88342 IMHCHEM/IMCYTCHM 1ST ANTB: CPT | Mod: 26,HCNC,, | Performed by: PATHOLOGY

## 2023-09-11 PROCEDURE — 3288F FALL RISK ASSESSMENT DOCD: CPT | Mod: HCNC,CPTII,S$GLB, | Performed by: DERMATOLOGY

## 2023-09-11 PROCEDURE — 88341 IMHCHEM/IMCYTCHM EA ADD ANTB: CPT | Mod: 59,HCNC | Performed by: PATHOLOGY

## 2023-09-11 PROCEDURE — 1101F PT FALLS ASSESS-DOCD LE1/YR: CPT | Mod: HCNC,CPTII,S$GLB, | Performed by: DERMATOLOGY

## 2023-09-11 PROCEDURE — 99203 OFFICE O/P NEW LOW 30 MIN: CPT | Mod: 25,HCNC,S$GLB, | Performed by: DERMATOLOGY

## 2023-09-11 PROCEDURE — 11103 TANGNTL BX SKIN EA SEP/ADDL: CPT | Mod: HCNC,S$GLB,, | Performed by: DERMATOLOGY

## 2023-09-11 PROCEDURE — 1126F PR PAIN SEVERITY QUANTIFIED, NO PAIN PRESENT: ICD-10-PCS | Mod: HCNC,CPTII,S$GLB, | Performed by: DERMATOLOGY

## 2023-09-11 PROCEDURE — 11103 PR TANGENTIAL BIOPSY, SKIN, EA ADDTL LESION: ICD-10-PCS | Mod: HCNC,S$GLB,, | Performed by: DERMATOLOGY

## 2023-09-11 PROCEDURE — 1159F MED LIST DOCD IN RCRD: CPT | Mod: HCNC,CPTII,S$GLB, | Performed by: DERMATOLOGY

## 2023-09-11 PROCEDURE — 1160F RVW MEDS BY RX/DR IN RCRD: CPT | Mod: HCNC,CPTII,S$GLB, | Performed by: DERMATOLOGY

## 2023-09-11 PROCEDURE — 88342 CHG IMMUNOCYTOCHEMISTRY: ICD-10-PCS | Mod: 26,HCNC,, | Performed by: PATHOLOGY

## 2023-09-11 PROCEDURE — 1101F PR PT FALLS ASSESS DOC 0-1 FALLS W/OUT INJ PAST YR: ICD-10-PCS | Mod: HCNC,CPTII,S$GLB, | Performed by: DERMATOLOGY

## 2023-09-11 PROCEDURE — 88342 IMHCHEM/IMCYTCHM 1ST ANTB: CPT | Mod: HCNC | Performed by: PATHOLOGY

## 2023-09-11 PROCEDURE — 11102 TANGNTL BX SKIN SINGLE LES: CPT | Mod: HCNC,S$GLB,, | Performed by: DERMATOLOGY

## 2023-09-11 PROCEDURE — 1159F PR MEDICATION LIST DOCUMENTED IN MEDICAL RECORD: ICD-10-PCS | Mod: HCNC,CPTII,S$GLB, | Performed by: DERMATOLOGY

## 2023-09-11 PROCEDURE — 84156 ASSAY OF PROTEIN URINE: CPT | Mod: HCNC | Performed by: INTERNAL MEDICINE

## 2023-09-11 PROCEDURE — 3288F PR FALLS RISK ASSESSMENT DOCUMENTED: ICD-10-PCS | Mod: HCNC,CPTII,S$GLB, | Performed by: DERMATOLOGY

## 2023-09-11 PROCEDURE — 17003 DESTRUCT PREMALG LES 2-14: CPT | Mod: HCNC,S$GLB,, | Performed by: DERMATOLOGY

## 2023-09-11 NOTE — PATIENT INSTRUCTIONS
Shave Biopsy Wound Care    Your doctor has performed a shave biopsy today.  A band aid and vaseline ointment has been placed over the site.  This should remain in place for NO LONGER THAN 48 hours.  It is fine to remove the bandaid after 24 hours, if the area is no longer bleeding. It is recommended that you keep the area dry (do not wet)) for the first 24 hours.  After 24 hours, wash the area with warm soap and water and apply Vaseline jelly.  Many patients prefer to use Neosporin or Bacitracin ointment.  This is acceptable; however, know that you can develop an allergy to this medication even if you have used it safely for years.  It is important to keep the area moist.  Letting it dry out and get air slows healing time, and will worsen the scar.        If you notice increasing redness, tenderness, pain, or yellow drainage at the biopsy site, please notify your doctor.  These are signs of an infection.    If your biopsy site is bleeding, apply firm pressure for 15 minutes straight.  Repeat for another 15 minutes, if it is still bleeding.   If the surgical site continues to bleed, then please contact your doctor.      For [facility] users:   You will receive your biopsy results in [facility] as soon as they are available. Please be assured that your physician/provider will review your results and will then determine what further treatment, evaluation, or planning is required. You should be contacted by your physician's/provider's office within 5 business days of receiving your results; If not, please reach out to directly. This is one more way [facility] is putting you first.     [facility] [place] [address]/ [phone] FAX/ [email or web address]         CRYOSURGERY      Your doctor has used a method called cryosurgery to treat your skin condition. Cryosurgery refers to the use of very cold substances to treat a variety of skin conditions such as warts, pre-skin cancers, molluscum  contagiosum, sun spots, and several benign growths. The substance we use in cryosurgery is liquid nitrogen and is so cold (-195 degrees Celsius) that is burns when administered.     Following treatment in the office, the skin may immediately burn and become red. You may find the area around the lesion is affected as well. It is sometimes necessary to treat not only the lesion, but a small area of the surrounding normal skin to achieve a good response.     A blister, and even a blood filled blister, may form after treatment.   This is a normal response. If the blister is painful, it is acceptable to sterilize a needle and with rubbing alcohol and gently pop the blister. It is important that you gently wash the area with soap and warm water as the blister fluid may contain wart virus if a wart was treated. Do no remove the roof of the blister.     The area treated can take anywhere from 1-3 weeks to heal. Healing time depends on the kind skin lesion treated, the location, and how aggressively the lesion was treated. It is recommended that the areas treated are covered with Vaseline or bacitracin ointment and a band-aid. If a band-aid is not practical, just ointment applied several times per day will do. Keeping these areas moist will speed the healing time.    Treatment with liquid nitrogen can leave a scar. In dark skin, it may be a light or dark scar, in light skin it may be a white or pink scar. These will generally fade with time, but may never go away completely.     If you have any concerns after your treatment, please feel free to call the office.       [facility] [place] [address]/ [phone] FAX/ [email or web address]

## 2023-09-11 NOTE — PROGRESS NOTES
Subjective:      Patient ID:  Viraj Olsen is a 72 y.o. male who presents for   Chief Complaint   Patient presents with    Skin Check     UBSE + legs    Lesion     L post knee, Post neck     Here for Upper Body Skin Exam    Last seen by dermatology never    No h/o nmsc or mm      Pt reports a moderate to severe amount of lifetime sun exposure.    Patient with specific complaint of lesion(s)  Location: L ant knee  Duration: 8 months  Symptoms: none  Relieving factors/Previous treatments: otc wart remover    Patient with new complaint of lesion(s)  Location: post neck  Duration: 5 months  Symptoms: bleeds occ  Relieving factors/Previous treatments: none    Has SLE - on plaquenil x many years      Review of Systems   Skin:  Positive for activity-related sunscreen use and wears hat (always). Negative for daily sunscreen use and recent sunburn.   Hematologic/Lymphatic: Bruises/bleeds easily (on coumadin and baby asa).       Objective:   Physical Exam   Constitutional: He appears well-developed and well-nourished. No distress.   Neurological: He is alert and oriented to person, place, and time. He is not disoriented.   Psychiatric: He has a normal mood and affect.   Skin:   Areas Examined (abnormalities noted in diagram):   Scalp / Hair Palpated and Inspected  Head / Face Inspection Performed  Neck Inspection Performed  Chest / Axilla Inspection Performed  Back Inspection Performed  RUE Inspected  LUE Inspection Performed  LLE Inspection Performed                 Diagram Legend     Erythematous scaling macule/papule c/w actinic keratosis       Vascular papule c/w angioma      Pigmented verrucoid papule/plaque c/w seborrheic keratosis      Yellow umbilicated papule c/w sebaceous hyperplasia      Irregularly shaped tan macule c/w lentigo     1-2 mm smooth white papules consistent with Milia      Movable subcutaneous cyst with punctum c/w epidermal inclusion cyst      Subcutaneous movable cyst c/w pilar cyst      Firm  pink to brown papule c/w dermatofibroma      Pedunculated fleshy papule(s) c/w skin tag(s)      Evenly pigmented macule c/w junctional nevus     Mildly variegated pigmented, slightly irregular-bordered macule c/w mildly atypical nevus      Flesh colored to evenly pigmented papule c/w intradermal nevus       Pink pearly papule/plaque c/w basal cell carcinoma      Erythematous hyperkeratotic cursted plaque c/w SCC      Surgical scar with no sign of skin cancer recurrence      Open and closed comedones      Inflammatory papules and pustules      Verrucoid papule consistent consistent with wart     Erythematous eczematous patches and plaques     Dystrophic onycholytic nail with subungual debris c/w onychomycosis     Umbilicated papule    Erythematous-base heme-crusted tan verrucoid plaque consistent with inflamed seborrheic keratosis     Erythematous Silvery Scaling Plaque c/w Psoriasis     See annotation                        Assessment / Plan:      Pathology Orders:       Normal Orders This Visit    Specimen to Pathology, Dermatology     Questions:    Procedure Type: Dermatology and skin neoplasms    Number of Specimens: 3    ------------------------: -------------------------    Spec 1 Procedure: Biopsy    Spec 1 Clinical Impression: r/o scc    Spec 1 Source: left post auricular neck    ------------------------: -------------------------    Spec 2 Procedure: Biopsy    Spec 2 Clinical Impression: r/o scc vs hak    Spec 2 Source: left lateral knee    ------------------------: -------------------------    Spec 3 Procedure: Biopsy    Spec 3 Clinical Impression: r/o MM vs atypical lentigo vs other    Spec 3 Source: left ear helix    Release to patient: Immediate          Neoplasm of uncertain behavior of skin  Shave biopsy procedure note:    Shave biopsy x 3 performed after verbal consent including risk of infection, scar, recurrence, need for additional treatment of site. Area prepped with alcohol, anesthetized with  approximately 1.0cc of 1% lidocaine with epinephrine. Lesional tissue shaved with razor blade. Hemostasis achieved with application of aluminum chloride followed by hyfrecation. No complications. Dressing applied. Wound care explained.    If biopsy positive for malignancy, refer to Dr. Person for Mohs surgery consultation.    AK (actinic keratosis)  Cryosurgery Procedure Note    Verbal consent from the patient is obtained including, but not limited to, risk of hypopigmentation/hyperpigmentation, scar, recurrence of lesion. The patient is aware of the precancerous quality and need for treatment of these lesions. Liquid nitrogen cryosurgery is applied to the 13 actinic keratoses, as detailed in the physical exam, to produce a freeze injury. The patient is aware that blisters may form and is instructed on wound care with gentle cleansing and use of vaseline ointment to keep moist until healed. The patient is supplied a handout on cryosurgery and is instructed to call if lesions do not completely resolve.    SK (seborrheic keratosis)  These are benign inherited growths without a malignant potential. Reassurance given to patient. No treatment is necessary.     Benign nevus   - minor problem and chronic.   Reassurance given to patient. No treatment necessary.     Lentigo  This is a benign hyperpigmented sun induced lesion. Recommend daily sun protection/avoidance and use of at least SPF 30, broad spectrum sunscreen (OTC drug) will reduce the number of new lesions. Treatment of these benign lesions are considered cosmetic.    Screening exam for skin cancer  Area(s) of previous NMSC evaluated with no signs of recurrence.    Upper body skin examination performed today including at least 6 points as noted in physical examination. Suspicious lesions noted.    Recommend daily sun protection/avoidance and use of at least SPF 30, broad spectrum sunscreen (OTC drug).             Follow up in about 6 months (around 3/11/2024).

## 2023-09-12 ENCOUNTER — ANTI-COAG VISIT (OUTPATIENT)
Dept: CARDIOLOGY | Facility: CLINIC | Age: 73
End: 2023-09-12
Payer: MEDICARE

## 2023-09-12 DIAGNOSIS — Z79.01 LONG TERM CURRENT USE OF ANTICOAGULANT THERAPY: ICD-10-CM

## 2023-09-12 DIAGNOSIS — D68.61 ANTI-PHOSPHOLIPID ANTIBODY SYNDROME: Primary | Chronic | ICD-10-CM

## 2023-09-12 PROCEDURE — 93793 ANTICOAG MGMT PT WARFARIN: CPT | Mod: S$GLB,,,

## 2023-09-12 PROCEDURE — 93793 PR ANTICOAGULANT MGMT FOR PT TAKING WARFARIN: ICD-10-PCS | Mod: S$GLB,,,

## 2023-09-12 NOTE — PROGRESS NOTES
[facility]    2023 8:40 AM    Assessment/Plan:    Patient presents today with subtherapeutic  INR.    Assessment of patient findings and chart review: no significant findings     Recommendation for patient's warfarin regimen: Boost dose today to 12.5mg then resume current maintenance dose    Recommend repeat INR in 2 weeks  _________________________________________________________________    Viraj Olsen (72 y.o.) is followed by the [facility].    Anticoagulation Summary  As of 2023      INR goal:  2.5-3.5   TTR:  70.2 % (11.1 y)   INR used for dosin.4 (2023)   Warfarin maintenance plan:  10 mg (5 mg x 2) every Tue, u, Sat; 5 mg (5 mg x 1) all other days   Weekly warfarin total:  50 mg   Plan last modified:  Octavio Beach, PharmD (2023)   Next INR check:  2023   Target end date:      Indications    Other forms of systemic lupus erythematosus [M32.8]  Impending cerebrovascular accident (Resolved) [I63.9]  Anti-phospholipid antibody syndrome [D68.61]  Long term current use of anticoagulant therapy [Z79.01]                 Anticoagulation Episode Summary       INR check location:  Clinic Lab    Preferred lab:  [facility]    Send INR reminders to:  [facility] COUMADIN MONITORING POOL    Comments:  [facility] only Mon - Thu (INRs ) // Antiphospholipid - must go to lab          Anticoagulation Care Providers       Provider Role Specialty Phone number    Preet Quintero MD Responsible Rheumatology [phone]

## 2023-09-18 ENCOUNTER — PATIENT MESSAGE (OUTPATIENT)
Dept: DERMATOLOGY | Facility: CLINIC | Age: 73
End: 2023-09-18
Payer: MEDICARE

## 2023-09-25 ENCOUNTER — LAB VISIT (OUTPATIENT)
Dept: LAB | Facility: HOSPITAL | Age: 73
End: 2023-09-25
Attending: INTERNAL MEDICINE
Payer: MEDICARE

## 2023-09-25 DIAGNOSIS — Z79.01 LONG TERM CURRENT USE OF ANTICOAGULANT THERAPY: ICD-10-CM

## 2023-09-25 DIAGNOSIS — D68.61 ANTI-PHOSPHOLIPID ANTIBODY SYNDROME: Chronic | ICD-10-CM

## 2023-09-25 DIAGNOSIS — M32.19 OTHER SYSTEMIC LUPUS ERYTHEMATOSUS WITH OTHER ORGAN INVOLVEMENT: ICD-10-CM

## 2023-09-25 LAB
INR PPP: 2.3 (ref 0.8–1.2)
PROTHROMBIN TIME: 23 SEC (ref 9–12.5)

## 2023-09-25 PROCEDURE — 85610 PROTHROMBIN TIME: CPT | Mod: HCNC | Performed by: INTERNAL MEDICINE

## 2023-09-25 PROCEDURE — 36415 COLL VENOUS BLD VENIPUNCTURE: CPT | Mod: HCNC,PO | Performed by: INTERNAL MEDICINE

## 2023-09-26 ENCOUNTER — ANTI-COAG VISIT (OUTPATIENT)
Dept: CARDIOLOGY | Facility: CLINIC | Age: 73
End: 2023-09-26
Payer: MEDICARE

## 2023-09-26 DIAGNOSIS — M32.19 OTHER SYSTEMIC LUPUS ERYTHEMATOSUS WITH OTHER ORGAN INVOLVEMENT: Primary | ICD-10-CM

## 2023-09-26 DIAGNOSIS — Z79.01 LONG TERM CURRENT USE OF ANTICOAGULANT THERAPY: ICD-10-CM

## 2023-09-26 DIAGNOSIS — D68.61 ANTI-PHOSPHOLIPID ANTIBODY SYNDROME: Chronic | ICD-10-CM

## 2023-09-26 PROCEDURE — 93793 PR ANTICOAGULANT MGMT FOR PT TAKING WARFARIN: ICD-10-PCS | Mod: S$GLB,,,

## 2023-09-26 PROCEDURE — 93793 ANTICOAG MGMT PT WARFARIN: CPT | Mod: S$GLB,,,

## 2023-09-26 NOTE — PROGRESS NOTES
[facility]    2023 8:07 AM    Assessment/Plan:    Patient presents today with subtherapeutic  INR.    Assessment of patient findings and chart review: no significant findings     Recommendation for patient's warfarin regimen: Boost dose today to 15mg then increase maintenance dose    Recommend repeat INR in 2 weeks  _________________________________________________________________    Viraj Olsen (72 y.o.) is followed by the [facility].    Anticoagulation Summary  As of 2023      INR goal:  2.5-3.5   TTR:  70.0 % (11.2 y)   INR used for dosin.3 (2023)   Warfarin maintenance plan:  5 mg (5 mg x 1) every Mon, Wed, Fri; 10 mg (5 mg x 2) all other days   Weekly warfarin total:  55 mg   Plan last modified:  Camelia Christopher, PharmD (2023)   Next INR check:  10/9/2023   Target end date:      Indications    Other forms of systemic lupus erythematosus [M32.8]  Impending cerebrovascular accident (Resolved) [I63.9]  Anti-phospholipid antibody syndrome [D68.61]  Long term current use of anticoagulant therapy [Z79.01]                 Anticoagulation Episode Summary       INR check location:  Clinic Lab    Preferred lab:  [facility]    Send INR reminders to:  [facility] COUMADIN MONITORING POOL    Comments:  [facility] only Mon - Thu (INRs ) // Antiphospholipid - must go to lab          Anticoagulation Care Providers       Provider Role Specialty Phone number    Preet Quintero MD Responsible Rheumatology [phone]

## 2023-09-29 ENCOUNTER — TELEPHONE (OUTPATIENT)
Dept: FAMILY MEDICINE | Facility: CLINIC | Age: 73
End: 2023-09-29
Payer: MEDICARE

## 2023-09-29 DIAGNOSIS — D68.61 ANTI-PHOSPHOLIPID ANTIBODY SYNDROME: ICD-10-CM

## 2023-09-29 DIAGNOSIS — Z13.6 ENCOUNTER FOR SCREENING FOR CARDIOVASCULAR DISORDERS: ICD-10-CM

## 2023-09-29 DIAGNOSIS — Z00.00 ROUTINE GENERAL MEDICAL EXAMINATION AT HEALTH CARE FACILITY: Primary | ICD-10-CM

## 2023-09-29 DIAGNOSIS — Z12.5 ENCOUNTER FOR SCREENING FOR MALIGNANT NEOPLASM OF PROSTATE: ICD-10-CM

## 2023-09-29 DIAGNOSIS — M32.19 OTHER SYSTEMIC LUPUS ERYTHEMATOSUS WITH OTHER ORGAN INVOLVEMENT: ICD-10-CM

## 2023-09-29 LAB
COMMENT: ABNORMAL
FINAL PATHOLOGIC DIAGNOSIS: ABNORMAL
GROSS: ABNORMAL
Lab: ABNORMAL
MICROSCOPIC EXAM: ABNORMAL

## 2023-10-02 NOTE — PROGRESS NOTES
1. Skin, left postauricular neck, shave biopsy:   -BASAL CELL CARCINOMA, NODULAR TYPE, EXTENDING TO THE PERIPHERAL AND DEEP BIOPSY EDGES     Refer to [facility] for mohs    2. Skin, left lateral knee, shave biopsy:   -VERRUCA VULGARIS     This lesion is benign. No further treatment needed.     3. Skin, left ear helix, shave biopsy:   -MALIGNANT MELANOMA, NON-ULCERATED, at least 0.6 MM IN DEPTH (at least pT1a),     Will refer to ENT onc for WLE    Recommend patient to Dr. Alcantara for Mohs surgery consultation for neck. Picture in chart.     General derm team: Sent patient for Mohs surgery. F/u with me in 3 months.

## 2023-10-03 ENCOUNTER — PATIENT MESSAGE (OUTPATIENT)
Dept: DERMATOLOGY | Facility: CLINIC | Age: 73
End: 2023-10-03
Payer: MEDICARE

## 2023-10-03 ENCOUNTER — TELEPHONE (OUTPATIENT)
Dept: HEMATOLOGY/ONCOLOGY | Facility: CLINIC | Age: 73
End: 2023-10-03
Payer: MEDICARE

## 2023-10-03 NOTE — TELEPHONE ENCOUNTER
Pt returned call, scheduled with Dr. Beck on Tuesday afternoon. Reviewed appt details & confirmed.

## 2023-10-03 NOTE — TELEPHONE ENCOUNTER
----- Message from Cha Becerril MD sent at 10/2/2023  4:56 PM CDT -----  3. Skin, left ear helix, shave biopsy:   -MALIGNANT MELANOMA, NON-ULCERATED, at least 0.6 MM IN DEPTH (at least pT1a), see synoptic report below     SYNOPTIC REPORT   Procedure: biopsy, shave   Specimen Laterality:  Left   Tumor Site:  Ear helix   Macroscopic Satellite Nodule(s): Not identified   Histologic Type:  Lentigo maligna type   Maximum Tumor (Breslow) Thickness:  at least 0.6 mm   Ulceration: Not identified   Microsatellite(s): Not identified   Margins:   Peripheral:  Involved by malignant melanoma in-situ.   Deep: Involved by invasive melanoma.   Mitotic rate: <1 mm2   Anatomic (Mark) level: at least III   Lympho-vascular invasion: Not identified   Neurotropism: Not identified   Tumor infilt rating lymphocytes: Present, non-brisk   Tumor regression: Not identified     Pathologic Stage Classification (pTNM, AJCC 8th Ed): at least pT1a      Spoke to pt. Discussed dx. Please call to schedule WLE with ENT onc. JAM

## 2023-10-03 NOTE — NURSING
Oncology Navigation   Intake  Date of Diagnosis: 09/11/23  Cancer Type: Melanoma  Internal / External Referral: Internal  Date of Referral: 10/03/23  Initial Nurse Navigator Contact: 10/03/23  Referral to Initial Contact Timeline (days): 0  Date Worked: 10/03/23  First Appointment Available: 10/10/23  Appointment Date: 10/10/23  First Available Date vs. Scheduled Date (days): 0  Multiple appointments: No     Treatment  Current Status: Staging work-up    Surgery: Planned  Surgical Oncologist: Dr. Radha Beck  Consult Date: 10/10/23          Procedures: Biopsy  Biopsy Schedule Date: 09/11/23                 Acuity      Follow Up  No follow-ups on file.

## 2023-10-04 ENCOUNTER — LAB VISIT (OUTPATIENT)
Dept: LAB | Facility: HOSPITAL | Age: 73
End: 2023-10-04
Attending: FAMILY MEDICINE
Payer: MEDICARE

## 2023-10-04 DIAGNOSIS — D68.61 ANTI-PHOSPHOLIPID ANTIBODY SYNDROME: ICD-10-CM

## 2023-10-04 DIAGNOSIS — Z12.5 ENCOUNTER FOR SCREENING FOR MALIGNANT NEOPLASM OF PROSTATE: ICD-10-CM

## 2023-10-04 DIAGNOSIS — M32.19 OTHER SYSTEMIC LUPUS ERYTHEMATOSUS WITH OTHER ORGAN INVOLVEMENT: ICD-10-CM

## 2023-10-04 DIAGNOSIS — Z00.00 ROUTINE GENERAL MEDICAL EXAMINATION AT HEALTH CARE FACILITY: ICD-10-CM

## 2023-10-04 DIAGNOSIS — Z13.6 ENCOUNTER FOR SCREENING FOR CARDIOVASCULAR DISORDERS: ICD-10-CM

## 2023-10-04 LAB
ALBUMIN SERPL BCP-MCNC: 3.9 G/DL (ref 3.5–5.2)
ALP SERPL-CCNC: 62 U/L (ref 55–135)
ALT SERPL W/O P-5'-P-CCNC: 23 U/L (ref 10–44)
ANION GAP SERPL CALC-SCNC: 8 MMOL/L (ref 8–16)
AST SERPL-CCNC: 26 U/L (ref 10–40)
BASOPHILS # BLD AUTO: 0.03 K/UL (ref 0–0.2)
BASOPHILS NFR BLD: 0.7 % (ref 0–1.9)
BILIRUB SERPL-MCNC: 0.3 MG/DL (ref 0.1–1)
BUN SERPL-MCNC: 17 MG/DL (ref 8–23)
CALCIUM SERPL-MCNC: 9.7 MG/DL (ref 8.7–10.5)
CHLORIDE SERPL-SCNC: 105 MMOL/L (ref 95–110)
CHOLEST SERPL-MCNC: 121 MG/DL (ref 120–199)
CHOLEST/HDLC SERPL: 2.4 {RATIO} (ref 2–5)
CO2 SERPL-SCNC: 28 MMOL/L (ref 23–29)
COMPLEXED PSA SERPL-MCNC: 0.72 NG/ML (ref 0–4)
CREAT SERPL-MCNC: 0.9 MG/DL (ref 0.5–1.4)
DIFFERENTIAL METHOD: ABNORMAL
EOSINOPHIL # BLD AUTO: 0.2 K/UL (ref 0–0.5)
EOSINOPHIL NFR BLD: 3.6 % (ref 0–8)
ERYTHROCYTE [DISTWIDTH] IN BLOOD BY AUTOMATED COUNT: 12.6 % (ref 11.5–14.5)
EST. GFR  (NO RACE VARIABLE): >60 ML/MIN/1.73 M^2
GLUCOSE SERPL-MCNC: 77 MG/DL (ref 70–110)
HCT VFR BLD AUTO: 37.5 % (ref 40–54)
HDLC SERPL-MCNC: 50 MG/DL (ref 40–75)
HDLC SERPL: 41.3 % (ref 20–50)
HGB BLD-MCNC: 12.6 G/DL (ref 14–18)
IMM GRANULOCYTES # BLD AUTO: 0.02 K/UL (ref 0–0.04)
IMM GRANULOCYTES NFR BLD AUTO: 0.5 % (ref 0–0.5)
LDLC SERPL CALC-MCNC: 60.6 MG/DL (ref 63–159)
LYMPHOCYTES # BLD AUTO: 1.5 K/UL (ref 1–4.8)
LYMPHOCYTES NFR BLD: 35 % (ref 18–48)
MCH RBC QN AUTO: 31.1 PG (ref 27–31)
MCHC RBC AUTO-ENTMCNC: 33.6 G/DL (ref 32–36)
MCV RBC AUTO: 93 FL (ref 82–98)
MONOCYTES # BLD AUTO: 0.4 K/UL (ref 0.3–1)
MONOCYTES NFR BLD: 9.5 % (ref 4–15)
NEUTROPHILS # BLD AUTO: 2.1 K/UL (ref 1.8–7.7)
NEUTROPHILS NFR BLD: 50.7 % (ref 38–73)
NONHDLC SERPL-MCNC: 71 MG/DL
NRBC BLD-RTO: 0 /100 WBC
PLATELET # BLD AUTO: 209 K/UL (ref 150–450)
PMV BLD AUTO: 9.7 FL (ref 9.2–12.9)
POTASSIUM SERPL-SCNC: 4.6 MMOL/L (ref 3.5–5.1)
PROT SERPL-MCNC: 6.6 G/DL (ref 6–8.4)
RBC # BLD AUTO: 4.05 M/UL (ref 4.6–6.2)
SODIUM SERPL-SCNC: 141 MMOL/L (ref 136–145)
TRIGL SERPL-MCNC: 52 MG/DL (ref 30–150)
WBC # BLD AUTO: 4.2 K/UL (ref 3.9–12.7)

## 2023-10-04 PROCEDURE — 85025 COMPLETE CBC W/AUTO DIFF WBC: CPT | Mod: HCNC | Performed by: FAMILY MEDICINE

## 2023-10-04 PROCEDURE — 80053 COMPREHEN METABOLIC PANEL: CPT | Mod: HCNC | Performed by: FAMILY MEDICINE

## 2023-10-04 PROCEDURE — 84153 ASSAY OF PSA TOTAL: CPT | Mod: HCNC | Performed by: FAMILY MEDICINE

## 2023-10-04 PROCEDURE — 80061 LIPID PANEL: CPT | Mod: HCNC | Performed by: FAMILY MEDICINE

## 2023-10-04 PROCEDURE — 36415 COLL VENOUS BLD VENIPUNCTURE: CPT | Mod: HCNC | Performed by: FAMILY MEDICINE

## 2023-10-09 ENCOUNTER — LAB VISIT (OUTPATIENT)
Dept: LAB | Facility: HOSPITAL | Age: 73
End: 2023-10-09
Attending: INTERNAL MEDICINE
Payer: MEDICARE

## 2023-10-09 DIAGNOSIS — Z79.01 LONG TERM CURRENT USE OF ANTICOAGULANT THERAPY: ICD-10-CM

## 2023-10-09 DIAGNOSIS — M32.19 OTHER SYSTEMIC LUPUS ERYTHEMATOSUS WITH OTHER ORGAN INVOLVEMENT: ICD-10-CM

## 2023-10-09 DIAGNOSIS — D68.61 ANTI-PHOSPHOLIPID ANTIBODY SYNDROME: Chronic | ICD-10-CM

## 2023-10-09 LAB
INR PPP: 2.1 (ref 0.8–1.2)
PROTHROMBIN TIME: 21.4 SEC (ref 9–12.5)

## 2023-10-09 PROCEDURE — 85610 PROTHROMBIN TIME: CPT | Mod: HCNC | Performed by: INTERNAL MEDICINE

## 2023-10-09 PROCEDURE — 36415 COLL VENOUS BLD VENIPUNCTURE: CPT | Mod: HCNC,PO | Performed by: INTERNAL MEDICINE

## 2023-10-10 ENCOUNTER — ANTI-COAG VISIT (OUTPATIENT)
Dept: CARDIOLOGY | Facility: CLINIC | Age: 73
End: 2023-10-10
Payer: MEDICARE

## 2023-10-10 ENCOUNTER — OFFICE VISIT (OUTPATIENT)
Dept: OTOLARYNGOLOGY | Facility: CLINIC | Age: 73
End: 2023-10-10
Payer: MEDICARE

## 2023-10-10 VITALS
BODY MASS INDEX: 26.79 KG/M2 | HEART RATE: 81 BPM | DIASTOLIC BLOOD PRESSURE: 67 MMHG | WEIGHT: 186.75 LBS | SYSTOLIC BLOOD PRESSURE: 117 MMHG

## 2023-10-10 DIAGNOSIS — C43.22 MALIGNANT MELANOMA OF LEFT PINNA: Primary | ICD-10-CM

## 2023-10-10 DIAGNOSIS — D68.61 ANTI-PHOSPHOLIPID ANTIBODY SYNDROME: Chronic | ICD-10-CM

## 2023-10-10 DIAGNOSIS — Z79.01 LONG TERM CURRENT USE OF ANTICOAGULANT THERAPY: ICD-10-CM

## 2023-10-10 DIAGNOSIS — M32.19 OTHER SYSTEMIC LUPUS ERYTHEMATOSUS WITH OTHER ORGAN INVOLVEMENT: Primary | ICD-10-CM

## 2023-10-10 PROCEDURE — 99999 PR PBB SHADOW E&M-EST. PATIENT-LVL III: CPT | Mod: PBBFAC,HCNC,, | Performed by: STUDENT IN AN ORGANIZED HEALTH CARE EDUCATION/TRAINING PROGRAM

## 2023-10-10 PROCEDURE — 1126F PR PAIN SEVERITY QUANTIFIED, NO PAIN PRESENT: ICD-10-PCS | Mod: HCNC,CPTII,S$GLB, | Performed by: STUDENT IN AN ORGANIZED HEALTH CARE EDUCATION/TRAINING PROGRAM

## 2023-10-10 PROCEDURE — 3008F PR BODY MASS INDEX (BMI) DOCUMENTED: ICD-10-PCS | Mod: HCNC,CPTII,S$GLB, | Performed by: STUDENT IN AN ORGANIZED HEALTH CARE EDUCATION/TRAINING PROGRAM

## 2023-10-10 PROCEDURE — 3078F DIAST BP <80 MM HG: CPT | Mod: HCNC,CPTII,S$GLB, | Performed by: STUDENT IN AN ORGANIZED HEALTH CARE EDUCATION/TRAINING PROGRAM

## 2023-10-10 PROCEDURE — 1159F MED LIST DOCD IN RCRD: CPT | Mod: HCNC,CPTII,S$GLB, | Performed by: STUDENT IN AN ORGANIZED HEALTH CARE EDUCATION/TRAINING PROGRAM

## 2023-10-10 PROCEDURE — 99203 PR OFFICE/OUTPT VISIT, NEW, LEVL III, 30-44 MIN: ICD-10-PCS | Mod: HCNC,S$GLB,, | Performed by: STUDENT IN AN ORGANIZED HEALTH CARE EDUCATION/TRAINING PROGRAM

## 2023-10-10 PROCEDURE — 3078F PR MOST RECENT DIASTOLIC BLOOD PRESSURE < 80 MM HG: ICD-10-PCS | Mod: HCNC,CPTII,S$GLB, | Performed by: STUDENT IN AN ORGANIZED HEALTH CARE EDUCATION/TRAINING PROGRAM

## 2023-10-10 PROCEDURE — 93793 PR ANTICOAGULANT MGMT FOR PT TAKING WARFARIN: ICD-10-PCS | Mod: S$GLB,,,

## 2023-10-10 PROCEDURE — 99203 OFFICE O/P NEW LOW 30 MIN: CPT | Mod: HCNC,S$GLB,, | Performed by: STUDENT IN AN ORGANIZED HEALTH CARE EDUCATION/TRAINING PROGRAM

## 2023-10-10 PROCEDURE — 3008F BODY MASS INDEX DOCD: CPT | Mod: HCNC,CPTII,S$GLB, | Performed by: STUDENT IN AN ORGANIZED HEALTH CARE EDUCATION/TRAINING PROGRAM

## 2023-10-10 PROCEDURE — 3074F PR MOST RECENT SYSTOLIC BLOOD PRESSURE < 130 MM HG: ICD-10-PCS | Mod: HCNC,CPTII,S$GLB, | Performed by: STUDENT IN AN ORGANIZED HEALTH CARE EDUCATION/TRAINING PROGRAM

## 2023-10-10 PROCEDURE — 1126F AMNT PAIN NOTED NONE PRSNT: CPT | Mod: HCNC,CPTII,S$GLB, | Performed by: STUDENT IN AN ORGANIZED HEALTH CARE EDUCATION/TRAINING PROGRAM

## 2023-10-10 PROCEDURE — 3074F SYST BP LT 130 MM HG: CPT | Mod: HCNC,CPTII,S$GLB, | Performed by: STUDENT IN AN ORGANIZED HEALTH CARE EDUCATION/TRAINING PROGRAM

## 2023-10-10 PROCEDURE — 99999 PR PBB SHADOW E&M-EST. PATIENT-LVL III: ICD-10-PCS | Mod: PBBFAC,HCNC,, | Performed by: STUDENT IN AN ORGANIZED HEALTH CARE EDUCATION/TRAINING PROGRAM

## 2023-10-10 PROCEDURE — 1159F PR MEDICATION LIST DOCUMENTED IN MEDICAL RECORD: ICD-10-PCS | Mod: HCNC,CPTII,S$GLB, | Performed by: STUDENT IN AN ORGANIZED HEALTH CARE EDUCATION/TRAINING PROGRAM

## 2023-10-10 PROCEDURE — 93793 ANTICOAG MGMT PT WARFARIN: CPT | Mod: S$GLB,,,

## 2023-10-10 NOTE — PROGRESS NOTES
Note to patients: In accordance with the  Century Cures Act, patients are now granted immediate electronic access to their medical records. This note is primarily intended for communication among medical professionals. As a result, it may incorporate medical terminology, abbreviations, or language that could appear blunt or unfamiliar. If you have questions about this document, we encourage you to discuss it with your physician.      [facility]    Patient: Viraj Olsen    : 1950    MRN: [id number]  Primary Care Provider: Mitch Rojas MD  Referring Provider/Derm: Cha Becerril MD   Date of Encounter: 10/10/2023    DIAGNOSIS: pending    CC:   Chief Complaint   Patient presents with    Melanoma       HPI:   Viraj Olsen is a 73 y.o. male with a past medical history significant for SLE/antiphospholipid syndrome who is being seen today for melanoma of the left pinna. The lesion was noted by dermatology, who performed a biopsy of the area.    Patient denies other symptoms.    Of note, patient also has a left neck lesion (BCC) and is scheduled for Mohs in November.    Patient lives in [place]    TREATMENT HISTORY:  N/A    ALLERGIES:  Review of patient's allergies indicates:  No Known Allergies      MEDICATIONS:    Current Outpatient Medications:     hydrOXYchloroQUINE (PLAQUENIL) 200 mg tablet, TAKE 1 TABLET BY MOUTH TWICE A DAY, Disp: 180 tablet, Rfl: 1    albuterol (PROVENTIL/VENTOLIN HFA) 90 mcg/actuation inhaler, Inhale 2 puffs into the lungs every 6 (six) hours as needed for Wheezing or Shortness of Breath. Rescue, Disp: 8 g, Rfl: 0    aspirin (ECOTRIN) 81 MG EC tablet, Take 81 mg by mouth once daily., Disp: , Rfl:     atorvastatin (LIPITOR) 40 MG tablet, TAKE 1 TABLET BY MOUTH EVERY DAY, Disp: 90 tablet, Rfl: 3    b complex vitamins capsule, Take 1 capsule by mouth once daily., Disp: , Rfl:     calcium carbonate 220 mg capsule, Take 500 mg by mouth once  daily., Disp: , Rfl:     CALCIUM CARBONATE/VITAMIN D3 (VITAMIN D-3 ORAL), Take 1 capsule by mouth once daily., Disp: , Rfl:     celecoxib (CELEBREX) 200 MG capsule, TAKE 1 CAPSULE BY MOUTH DAILY AS NEEDED FOR PAIN., Disp: 20 capsule, Rfl: 0    ferrous sulfate 325 (65 FE) MG EC tablet, Take 325 mg by mouth once daily., Disp: , Rfl:     fish oil-omega-3 fatty acids 300-1,000 mg capsule, Take 1 capsule by mouth once daily., Disp: , Rfl:     FLUZONE HIGH-DOSE 2018-19, PF, 180 mcg/0.5 mL vaccine, ADM 0.5ML IM UTD, Disp: , Rfl: 0    melatonin 3 mg Tab, , Disp: , Rfl:     multivitamin (THERAGRAN) per tablet, Take 1 tablet by mouth once daily., Disp: , Rfl:     mupirocin (BACTROBAN) 2 % ointment, Apply to affected area 3 times daily (Patient not taking: Reported on 11/23/2022), Disp: 22 g, Rfl: 1    TRANSDERM-SCOP 1 mg over 3 days, UNW AND PHU UTD, Disp: , Rfl: 1    warfarin (JANTOVEN) 5 MG tablet, TAKE 2 AND 1/2 TABLETS DAILY EXCEPT TAKE 2 TABLETS ON TUESDAY, THURSDAY AND SUNDAY OR AS DIRECTED BY COUMADIN CLINIC., Disp: 195 tablet, Rfl: 0    zinc sulfate (ZINCATE) 50 mg zinc (220 mg) capsule, Take 220 mg by mouth once daily., Disp: , Rfl:     PAST MEDICAL HISTORY:  Past Medical History:   Diagnosis Date    Anti-phospholipid antibody syndrome 6/21/2012    Cataract     Leukopenia 6/7/2013    SLE associated    Livedo reticularis 6/7/2013    Lupus     Post-traumatic osteoarthritis of knee 4/1/2020        PAST SURGICAL HISTORY:  Past Surgical History:   Procedure Laterality Date    abdondectomy      APPENDECTOMY      COLONOSCOPY N/A 12/28/2016    Procedure: COLONOSCOPY;  Surgeon: Hillary García MD;  Location: [facility];  Service: Endoscopy;  Laterality: N/A;    TONSILLECTOMY          FAMILY HISTORY:  Family History   Problem Relation Age of Onset    Hypertension Father     Heart disease Father     Lupus Mother     No Known Problems Sister     No Known Problems Brother     No Known Problems Maternal Aunt     No Known  Problems Maternal Uncle     No Known Problems Paternal Aunt     No Known Problems Paternal Uncle     No Known Problems Maternal Grandmother     No Known Problems Maternal Grandfather     No Known Problems Paternal Grandmother     No Known Problems Paternal Grandfather     Amblyopia Neg Hx     Blindness Neg Hx     Cancer Neg Hx     Cataracts Neg Hx     Diabetes Neg Hx     Glaucoma Neg Hx     Macular degeneration Neg Hx     Retinal detachment Neg Hx     Strabismus Neg Hx     Stroke Neg Hx     Thyroid disease Neg Hx        SOCIAL HISTORY:  Social History     Socioeconomic History    Marital status:    Tobacco Use    Smoking status: Never    Smokeless tobacco: Never   Substance and Sexual Activity    Alcohol use: Yes     Comment: social    Drug use: No    Sexual activity: Yes     Partners: Female     Social Determinants of Health     Stress: No Stress Concern Present (12/2/2019)    [employer] - Occupational Stress Questionnaire     Feeling of Stress : Not at all     See above substance history    REVIEW OF SYSTEMS:   Comprehensive review of systems was discussed with the patient.  It is positive only for the above complaints.    PHYSICAL EXAMINATION:  Blood pressure 117/67, pulse 81, weight 84.7 kg (186 lb 11.7 oz).    Constitutional: Non-toxic appearing.   Psychiatric: Appropriate mood and affect. Cooperative.  Voice: Non-dysphonic, speaking in full sentences.   Neurologic: Cranial nerves grossly intact, no focal deficits.  Head and face: Salivary glands are not enlarged. Face is symmetric. CN VII strength and sensation intact.  Skin: Scar on left helix, not fixed to underlying perichondrium, left posterior neck with lesion  Eyes: Vision grossly intact, bilateral extraocular movements intact  Ears: Bilateral pinna, mastoid, external ear canal normal. Hearing intact.   Nose: External nose appears normal.   Lips: No ulcers or lesions  Neck: Soft and flat,  no masses or  "lymphadenopathy.   Respiratory: Chest expansion symmetric, no audible stridor or stertor. Breathing is unlabored. No active cough.    CLINICAL PHOTOS:      DATA REVIEWED:   PATHOLOGY:  Skin, left ear helix, shave biopsy:   -MALIGNANT MELANOMA, NON-ULCERATED, at least 0.6 MM IN DEPTH (at least pT1a), see synoptic report below     SYNOPTIC REPORT   Procedure: biopsy, shave   Specimen Laterality:  Left   Tumor Site:  Ear helix   Macroscopic Satellite Nodule(s): Not identified   Histologic Type:  Lentigo maligna type   Maximum Tumor (Breslow) Thickness:  at least 0.6 mm   Ulceration: Not identified   Microsatellite(s): Not identified   Margins:   Peripheral:  Involved by malignant melanoma in-situ.   Deep: Involved by invasive melanoma.   Mitotic rate: <1 mm2   Anatomic (Mark) level: at least III   Lympho-vascular invasion: Not identified   Neurotropism: Not identified   Tumor infilt rating lymphocytes: Present, non-brisk   Tumor regression: Not identified     Pathologic Stage Classification (pTNM, AJCC 8th Ed): at least pT1a     ASSESSMENT AND PLAN:  1. Malignant melanoma of left pinna         Viraj Olsen is a 73 y.o. male with melanoma of the left helix.   - Staging is "at least" T1a, which would be Stage 1A. Because this was a shave biopsy, there is the potential for deeper invasion than described. Given that the depth would have to be at least .4mm in addition to the .6mm as described, and there does not seem to be any fixed component, would not perform routine sentinel lymph node biopsy at this time unless strongly preferred by patient.  - Will schedule for wide local excision under local in 2 weeks at end of clinic  - Further treatment pending pathology    Patient encouraged to call with any questions, concerns, or new or worsening symptoms.     Follow up 2 weeks for procedure    "

## 2023-10-10 NOTE — PROGRESS NOTES
[facility]    10/10/2023 10:54 AM    Assessment/Plan:    Patient presents today with subtherapeutic  INR.    Assessment of patient findings and chart review: Reports broccoli intake    Recommendation for patient's warfarin regimen: Boost dose today to 15mg then increase maintenance dose    Recommend repeat INR in 1 week  _________________________________________________________________    Viraj Olsen (73 y.o.) is followed by the [facility].    Anticoagulation Summary  As of 10/10/2023      INR goal:  2.5-3.5   TTR:  69.7 % (11.2 y)   INR used for dosin.1 (10/9/2023)   Warfarin maintenance plan:  5 mg (5 mg x 1) every Mon, Fri; 10 mg (5 mg x 2) all other days   Weekly warfarin total:  60 mg   Plan last modified:  Camelia Christopher, PharmD (10/10/2023)   Next INR check:  10/17/2023   Target end date:      Indications    Other forms of systemic lupus erythematosus [M32.8]  Impending cerebrovascular accident (Resolved) [I63.9]  Anti-phospholipid antibody syndrome [D68.61]  Long term current use of anticoagulant therapy [Z79.01]                 Anticoagulation Episode Summary       INR check location:  Clinic Lab    Preferred lab:  [facility]    Send INR reminders to:  [facility] COUMADIN MONITORING POOL    Comments:  [facility] only Mon - Thu (INRs ) // Antiphospholipid - must go to lab          Anticoagulation Care Providers       Provider Role Specialty Phone number    Preet Quintero MD Responsible Rheumatology [phone]            Patient Findings       Positives:  Change in diet/appetite    Negatives:  Signs/symptoms of thrombosis, Signs/symptoms of bleeding, Laboratory test error suspected, Change in health, Change in alcohol use, Change in activity, Upcoming invasive procedure, Emergency department visit, Upcoming dental procedure, Missed doses, Extra doses, Change in  medications, Hospital admission, Bruising, Other complaints    Comments:  Confirmed taking correct dose  Reports eating broccoli 10/8  No other changes

## 2023-10-17 ENCOUNTER — OFFICE VISIT (OUTPATIENT)
Dept: FAMILY MEDICINE | Facility: CLINIC | Age: 73
End: 2023-10-17
Attending: FAMILY MEDICINE
Payer: MEDICARE

## 2023-10-17 ENCOUNTER — LAB VISIT (OUTPATIENT)
Dept: LAB | Facility: HOSPITAL | Age: 73
End: 2023-10-17
Attending: FAMILY MEDICINE
Payer: MEDICARE

## 2023-10-17 ENCOUNTER — ANTI-COAG VISIT (OUTPATIENT)
Dept: CARDIOLOGY | Facility: CLINIC | Age: 73
End: 2023-10-17
Payer: MEDICARE

## 2023-10-17 VITALS
HEIGHT: 70 IN | TEMPERATURE: 98 F | HEART RATE: 75 BPM | OXYGEN SATURATION: 98 % | BODY MASS INDEX: 26.48 KG/M2 | DIASTOLIC BLOOD PRESSURE: 70 MMHG | WEIGHT: 185 LBS | SYSTOLIC BLOOD PRESSURE: 110 MMHG

## 2023-10-17 DIAGNOSIS — Z79.899 DRUG-INDUCED IMMUNODEFICIENCY: ICD-10-CM

## 2023-10-17 DIAGNOSIS — D84.821 DRUG-INDUCED IMMUNODEFICIENCY: ICD-10-CM

## 2023-10-17 DIAGNOSIS — Z79.01 LONG TERM CURRENT USE OF ANTICOAGULANT THERAPY: ICD-10-CM

## 2023-10-17 DIAGNOSIS — Z12.11 ENCOUNTER FOR FIT (FECAL IMMUNOCHEMICAL TEST) SCREENING: ICD-10-CM

## 2023-10-17 DIAGNOSIS — D68.61 ANTI-PHOSPHOLIPID ANTIBODY SYNDROME: ICD-10-CM

## 2023-10-17 DIAGNOSIS — I70.0 AORTIC ATHEROSCLEROSIS: ICD-10-CM

## 2023-10-17 DIAGNOSIS — M32.19 OTHER SYSTEMIC LUPUS ERYTHEMATOSUS WITH OTHER ORGAN INVOLVEMENT: ICD-10-CM

## 2023-10-17 DIAGNOSIS — D68.61 ANTI-PHOSPHOLIPID ANTIBODY SYNDROME: Chronic | ICD-10-CM

## 2023-10-17 DIAGNOSIS — M32.19 OTHER SYSTEMIC LUPUS ERYTHEMATOSUS WITH OTHER ORGAN INVOLVEMENT: Primary | ICD-10-CM

## 2023-10-17 DIAGNOSIS — Z00.00 ROUTINE GENERAL MEDICAL EXAMINATION AT HEALTH CARE FACILITY: Primary | ICD-10-CM

## 2023-10-17 DIAGNOSIS — Z23 IMMUNIZATION DUE: ICD-10-CM

## 2023-10-17 DIAGNOSIS — I49.1 PAC (PREMATURE ATRIAL CONTRACTION): ICD-10-CM

## 2023-10-17 DIAGNOSIS — I49.3 PVC (PREMATURE VENTRICULAR CONTRACTION): ICD-10-CM

## 2023-10-17 LAB
INR PPP: 3.2 (ref 0.8–1.2)
PROTHROMBIN TIME: 32.8 SEC (ref 9–12.5)

## 2023-10-17 PROCEDURE — 93793 ANTICOAG MGMT PT WARFARIN: CPT | Mod: S$GLB,,,

## 2023-10-17 PROCEDURE — 3008F BODY MASS INDEX DOCD: CPT | Mod: HCNC,CPTII,S$GLB, | Performed by: FAMILY MEDICINE

## 2023-10-17 PROCEDURE — 3074F PR MOST RECENT SYSTOLIC BLOOD PRESSURE < 130 MM HG: ICD-10-PCS | Mod: HCNC,CPTII,S$GLB, | Performed by: FAMILY MEDICINE

## 2023-10-17 PROCEDURE — 90677 PCV20 VACCINE IM: CPT | Mod: HCNC,S$GLB,, | Performed by: FAMILY MEDICINE

## 2023-10-17 PROCEDURE — 85610 PROTHROMBIN TIME: CPT | Mod: HCNC | Performed by: INTERNAL MEDICINE

## 2023-10-17 PROCEDURE — 3008F PR BODY MASS INDEX (BMI) DOCUMENTED: ICD-10-PCS | Mod: HCNC,CPTII,S$GLB, | Performed by: FAMILY MEDICINE

## 2023-10-17 PROCEDURE — G0009 ADMIN PNEUMOCOCCAL VACCINE: HCPCS | Mod: HCNC,S$GLB,, | Performed by: FAMILY MEDICINE

## 2023-10-17 PROCEDURE — 99214 OFFICE O/P EST MOD 30 MIN: CPT | Mod: 25,HCNC,S$GLB, | Performed by: FAMILY MEDICINE

## 2023-10-17 PROCEDURE — 93793 PR ANTICOAGULANT MGMT FOR PT TAKING WARFARIN: ICD-10-PCS | Mod: S$GLB,,,

## 2023-10-17 PROCEDURE — 3078F PR MOST RECENT DIASTOLIC BLOOD PRESSURE < 80 MM HG: ICD-10-PCS | Mod: HCNC,CPTII,S$GLB, | Performed by: FAMILY MEDICINE

## 2023-10-17 PROCEDURE — 99999 PR PBB SHADOW E&M-EST. PATIENT-LVL III: CPT | Mod: PBBFAC,HCNC,, | Performed by: FAMILY MEDICINE

## 2023-10-17 PROCEDURE — 3078F DIAST BP <80 MM HG: CPT | Mod: HCNC,CPTII,S$GLB, | Performed by: FAMILY MEDICINE

## 2023-10-17 PROCEDURE — G0009 PNEUMOCOCCAL CONJUGATE VACCINE 20-VALENT: ICD-10-PCS | Mod: HCNC,S$GLB,, | Performed by: FAMILY MEDICINE

## 2023-10-17 PROCEDURE — 99999 PR PBB SHADOW E&M-EST. PATIENT-LVL III: ICD-10-PCS | Mod: PBBFAC,HCNC,, | Performed by: FAMILY MEDICINE

## 2023-10-17 PROCEDURE — 99214 PR OFFICE/OUTPT VISIT, EST, LEVL IV, 30-39 MIN: ICD-10-PCS | Mod: 25,HCNC,S$GLB, | Performed by: FAMILY MEDICINE

## 2023-10-17 PROCEDURE — 3074F SYST BP LT 130 MM HG: CPT | Mod: HCNC,CPTII,S$GLB, | Performed by: FAMILY MEDICINE

## 2023-10-17 PROCEDURE — 90677 PNEUMOCOCCAL CONJUGATE VACCINE 20-VALENT: ICD-10-PCS | Mod: HCNC,S$GLB,, | Performed by: FAMILY MEDICINE

## 2023-10-17 PROCEDURE — 36415 COLL VENOUS BLD VENIPUNCTURE: CPT | Mod: HCNC | Performed by: INTERNAL MEDICINE

## 2023-10-17 NOTE — PROGRESS NOTES
Subjective:       Patient ID: Viraj Olsen is a 73 y.o. male.    Chief Complaint: Annual Exam    73 yr old pleasant white male with antiphospholipid antibody syndrome, SLE, RLS, and no other significant chronic medical history presents today for his annual wellness exam and lab work.    APAS/SLE - diagnosed about 20-21 years ago - on coumadin and plaquenil since then - had multiple TIA prior to being diagnosed with APAS 21 years ago - no issues - follows Rheumatology      RLS - controlled       History as below - reviewed in detail      HM  -labs due  -PSA due            Review of Systems   Constitutional: Negative.  Negative for activity change, diaphoresis and unexpected weight change.   HENT: Negative.  Negative for nasal congestion, ear pain, mouth sores, rhinorrhea and voice change.    Eyes: Negative.  Negative for pain, discharge and visual disturbance.   Respiratory: Negative.  Negative for apnea, cough and wheezing.    Cardiovascular: Negative.  Negative for chest pain and palpitations.   Gastrointestinal: Negative.  Negative for abdominal distention, anal bleeding, diarrhea and vomiting.   Endocrine: Negative.  Negative for cold intolerance and polyuria.   Genitourinary: Negative.  Negative for decreased urine volume, difficulty urinating, discharge, frequency and scrotal swelling.   Musculoskeletal: Negative.  Negative for back pain, myalgias and neck stiffness.   Integumentary:  Negative for color change and rash. Negative.   Allergic/Immunologic: Negative.  Negative for environmental allergies.   Neurological: Negative.  Negative for dizziness, speech difficulty, weakness and light-headedness.   Hematological: Negative.    Psychiatric/Behavioral: Negative.  Negative for agitation, dysphoric mood and suicidal ideas. The patient is not nervous/anxious.      Active Ambulatory Problems     Diagnosis Date Noted    Other forms of systemic lupus erythematosus 06/21/2012    Anti-phospholipid antibody syndrome  06/21/2012    Long term current use of anticoagulant therapy 08/02/2012    High risk medication use 05/24/2013    Restless legs syndrome (RLS) 06/07/2013    Livedo reticularis 06/07/2013    Lymphopenia 10/13/2016    Screening for colon cancer 12/28/2016    Post-traumatic osteoarthritis of knee 04/01/2020    PVCs (premature ventricular contractions) 08/08/2022    Premature atrial contractions 08/08/2022    Nonrheumatic aortic valve stenosis 08/08/2022    Overweight (BMI 25.0-29.9) 08/10/2022    Agatston CAC score 200-399 08/20/2022    Lung nodule 08/20/2022    Malignant melanoma of left pinna 10/10/2023    Drug-induced immunodeficiency 10/17/2023    Aortic atherosclerosis 10/17/2023     Resolved Ambulatory Problems     Diagnosis Date Noted    Impending cerebrovascular accident 06/21/2012    Leukopenia 06/07/2013     Past Medical History:   Diagnosis Date    Cataract     Lupus      Past Surgical History:   Procedure Laterality Date    abdondectomy      APPENDECTOMY      COLONOSCOPY N/A 12/28/2016    Procedure: COLONOSCOPY;  Surgeon: Hillary García MD;  Location: [facility];  Service: Endoscopy;  Laterality: N/A;    TONSILLECTOMY       Family History   Problem Relation Age of Onset    Hypertension Father     Heart disease Father     Lupus Mother     No Known Problems Sister     No Known Problems Brother     No Known Problems Maternal Aunt     No Known Problems Maternal Uncle     No Known Problems Paternal Aunt     No Known Problems Paternal Uncle     No Known Problems Maternal Grandmother     No Known Problems Maternal Grandfather     No Known Problems Paternal Grandmother     No Known Problems Paternal Grandfather     Amblyopia Neg Hx     Blindness Neg Hx     Cancer Neg Hx     Cataracts Neg Hx     Diabetes Neg Hx     Glaucoma Neg Hx     Macular degeneration Neg Hx     Retinal detachment Neg Hx     Strabismus Neg Hx     Stroke Neg Hx     Thyroid disease Neg Hx      Social History     Socioeconomic History     Marital status:    Tobacco Use    Smoking status: Never     Passive exposure: Never    Smokeless tobacco: Never   Substance and Sexual Activity    Alcohol use: Yes     Comment: social    Drug use: No    Sexual activity: Yes     Partners: Female     Social Determinants of Health     Stress: No Stress Concern Present (12/2/2019)    [employer] - Occupational Stress Questionnaire     Feeling of Stress : Not at all     Review of patient's allergies indicates:  No Known Allergies  Current Outpatient Medications on File Prior to Visit   Medication Sig Dispense Refill    aspirin (ECOTRIN) 81 MG EC tablet Take 81 mg by mouth once daily.      atorvastatin (LIPITOR) 40 MG tablet TAKE 1 TABLET BY MOUTH EVERY DAY 90 tablet 3    celecoxib (CELEBREX) 200 MG capsule TAKE 1 CAPSULE BY MOUTH DAILY AS NEEDED FOR PAIN. 20 capsule 0    hydrOXYchloroQUINE (PLAQUENIL) 200 mg tablet TAKE 1 TABLET BY MOUTH TWICE A  tablet 1    warfarin (JANTOVEN) 5 MG tablet TAKE 2 AND 1/2 TABLETS DAILY EXCEPT TAKE 2 TABLETS ON TUESDAY, THURSDAY AND SUNDAY OR AS DIRECTED BY COUMADIN CLINIC. 195 tablet 0    albuterol (PROVENTIL/VENTOLIN HFA) 90 mcg/actuation inhaler Inhale 2 puffs into the lungs every 6 (six) hours as needed for Wheezing or Shortness of Breath. Rescue 8 g 0    b complex vitamins capsule Take 1 capsule by mouth once daily.      calcium carbonate 220 mg capsule Take 500 mg by mouth once daily.      CALCIUM CARBONATE/VITAMIN D3 (VITAMIN D-3 ORAL) Take 1 capsule by mouth once daily.      ferrous sulfate 325 (65 FE) MG EC tablet Take 325 mg by mouth once daily.      fish oil-omega-3 fatty acids 300-1,000 mg capsule Take 1 capsule by mouth once daily.      FLUZONE HIGH-DOSE 2018-19, PF, 180 mcg/0.5 mL vaccine ADM 0.5ML IM UTD  0    melatonin 3 mg Tab       multivitamin (THERAGRAN) per tablet Take 1 tablet by mouth once daily.      mupirocin (BACTROBAN) 2 % ointment Apply to affected area 3 times daily  (Patient not taking: Reported on 11/23/2022) 22 g 1    TRANSDERM-SCOP 1 mg over 3 days UNW AND PHU UTD  1    zinc sulfate (ZINCATE) 50 mg zinc (220 mg) capsule Take 220 mg by mouth once daily.       No current facility-administered medications on file prior to visit.           Objective:       Vitals:    10/17/23 1036   BP: 110/70   Pulse: 75   Temp: 98 °F (36.7 °C)       Physical Exam  Constitutional:       Appearance: He is well-developed.   HENT:      Head: Normocephalic and atraumatic.      Right Ear: External ear normal.      Left Ear: External ear normal.      Nose: Nose normal.      Mouth/Throat:      Pharynx: No oropharyngeal exudate.   Eyes:      General: No scleral icterus.        Right eye: No discharge.         Left eye: No discharge.      Conjunctiva/sclera: Conjunctivae normal.      Pupils: Pupils are equal, round, and reactive to light.   Neck:      Thyroid: No thyromegaly.      Vascular: No JVD.      Trachea: No tracheal deviation.   Cardiovascular:      Rate and Rhythm: Normal rate and regular rhythm.      Heart sounds: Murmur (2/6 esm best heard in aortic area) heard.      No friction rub. No gallop.   Pulmonary:      Effort: Pulmonary effort is normal. No respiratory distress.      Breath sounds: Normal breath sounds. No stridor. No wheezing or rales.   Chest:      Chest wall: No tenderness.   Abdominal:      General: Bowel sounds are normal. There is no distension.      Palpations: Abdomen is soft. There is no mass.      Tenderness: There is no abdominal tenderness. There is no guarding or rebound.      Hernia: No hernia is present.   Musculoskeletal:         General: No tenderness. Normal range of motion.      Cervical back: Normal range of motion and neck supple.   Lymphadenopathy:      Cervical: No cervical adenopathy.   Skin:     General: Skin is warm and dry.      Coloration: Skin is not pale.      Findings: No erythema or rash.   Neurological:      Mental Status: He is alert and oriented to  person, place, and time.      Cranial Nerves: No cranial nerve deficit.      Motor: No abnormal muscle tone.      Coordination: Coordination normal.      Deep Tendon Reflexes: Reflexes are normal and symmetric. Reflexes normal.   Psychiatric:         Behavior: Behavior normal.         Thought Content: Thought content normal.         Judgment: Judgment normal.           Lab Visit on 10/17/2023   Component Date Value Ref Range Status    Prothrombin Time 10/17/2023 32.8 (H)  9.0 - 12.5 sec Final    INR 10/17/2023 3.2 (H)  0.8 - 1.2 Final    Comment: Coumadin Therapy:  2.0 - 3.0 for INR for all indicators except mechanical heart valves  and antiphospholipid syndromes which should use 2.5 - 3.5.  LOT^040^PT Inn^[id number]     Lab Visit on 10/09/2023   Component Date Value Ref Range Status    Prothrombin Time 10/09/2023 21.4 (H)  9.0 - 12.5 sec Final    INR 10/09/2023 2.1 (H)  0.8 - 1.2 Final    Comment: Coumadin Therapy:  2.0 - 3.0 for INR for all indicators except mechanical heart valves  and antiphospholipid syndromes which should use 2.5 - 3.5.     Lab Visit on 10/04/2023   Component Date Value Ref Range Status    WBC 10/04/2023 4.20  3.90 - 12.70 K/uL Final    RBC 10/04/2023 4.05 (L)  4.60 - 6.20 M/uL Final    Hemoglobin 10/04/2023 12.6 (L)  14.0 - 18.0 g/dL Final    Hematocrit 10/04/2023 37.5 (L)  40.0 - 54.0 % Final    MCV 10/04/2023 93  82 - 98 fL Final    MCH 10/04/2023 31.1 (H)  27.0 - 31.0 pg Final    MCHC 10/04/2023 33.6  32.0 - 36.0 g/dL Final    RDW 10/04/2023 12.6  11.5 - 14.5 % Final    Platelets 10/04/2023 209  150 - 450 K/uL Final    MPV 10/04/2023 9.7  9.2 - 12.9 fL Final    Immature Granulocytes 10/04/2023 0.5  0.0 - 0.5 % Final    Gran # (ANC) 10/04/2023 2.1  1.8 - 7.7 K/uL Final    Immature Grans (Abs) 10/04/2023 0.02  0.00 - 0.04 K/uL Final    Comment: Mild elevation in immature granulocytes is non specific and   can be seen in a variety of conditions including stress response,   acute inflammation,  trauma and pregnancy. Correlation with other   laboratory and clinical findings is essential.      Lymph # 10/04/2023 1.5  1.0 - 4.8 K/uL Final    Mono # 10/04/2023 0.4  0.3 - 1.0 K/uL Final    Eos # 10/04/2023 0.2  0.0 - 0.5 K/uL Final    Baso # 10/04/2023 0.03  0.00 - 0.20 K/uL Final    nRBC 10/04/2023 0  0 /100 WBC Final    Gran % 10/04/2023 50.7  38.0 - 73.0 % Final    Lymph % 10/04/2023 35.0  18.0 - 48.0 % Final    Mono % 10/04/2023 9.5  4.0 - 15.0 % Final    Eosinophil % 10/04/2023 3.6  0.0 - 8.0 % Final    Basophil % 10/04/2023 0.7  0.0 - 1.9 % Final    Differential Method 10/04/2023 Automated   Final    Sodium 10/04/2023 141  136 - 145 mmol/L Final    Potassium 10/04/2023 4.6  3.5 - 5.1 mmol/L Final    Chloride 10/04/2023 105  95 - 110 mmol/L Final    CO2 10/04/2023 28  23 - 29 mmol/L Final    Glucose 10/04/2023 77  70 - 110 mg/dL Final    BUN 10/04/2023 17  8 - 23 mg/dL Final    Creatinine 10/04/2023 0.9  0.5 - 1.4 mg/dL Final    Calcium 10/04/2023 9.7  8.7 - 10.5 mg/dL Final    Total Protein 10/04/2023 6.6  6.0 - 8.4 g/dL Final    Albumin 10/04/2023 3.9  3.5 - 5.2 g/dL Final    Total Bilirubin 10/04/2023 0.3  0.1 - 1.0 mg/dL Final    Comment: For infants and newborns, interpretation of results should be based  on gestational age, weight and in agreement with clinical  observations.    Premature Infant recommended reference ranges:  Up to 24 hours.............<8.0 mg/dL  Up to 48 hours............<12.0 mg/dL  3-5 days..................<15.0 mg/dL  6-29 days.................<15.0 mg/dL      Alkaline Phosphatase 10/04/2023 62  55 - 135 U/L Final    AST 10/04/2023 26  10 - 40 U/L Final    ALT 10/04/2023 23  10 - 44 U/L Final    eGFR 10/04/2023 >60  >60 mL/min/1.73 m^2 Final    Anion Gap 10/04/2023 8  8 - 16 mmol/L Final    Cholesterol 10/04/2023 121  120 - 199 mg/dL Final    Comment: The National Cholesterol Education Program (NCEP) has set the  following guidelines (reference ranges) for  Cholesterol:  Optimal.....................<200 mg/dL  Borderline High.............200-239 mg/dL  High........................> or = 240 mg/dL      Triglycerides 10/04/2023 52  30 - 150 mg/dL Final    Comment: The National Cholesterol Education Program (NCEP) has set the  following guidelines (reference values) for triglycerides:  Normal......................<150 mg/dL  Borderline High.............150-199 mg/dL  High........................200-499 mg/dL      HDL 10/04/2023 50  40 - 75 mg/dL Final    Comment: The National Cholesterol Education Program (NCEP) has set the  following guidelines (reference values) for HDL Cholesterol:  Low...............<40 mg/dL  Optimal...........>60 mg/dL      LDL Cholesterol 10/04/2023 60.6 (L)  63.0 - 159.0 mg/dL Final    Comment: The National Cholesterol Education Program (NCEP) has set the  following guidelines (reference values) for LDL Cholesterol:  Optimal.......................<130 mg/dL  Borderline High...............130-159 mg/dL  High..........................160-189 mg/dL  Very High.....................>190 mg/dL      HDL/Cholesterol Ratio 10/04/2023 41.3  20.0 - 50.0 % Final    Total Cholesterol/HDL Ratio 10/04/2023 2.4  2.0 - 5.0 Final    Non-HDL Cholesterol 10/04/2023 71  mg/dL Final    Comment: Risk category and Non-HDL cholesterol goals:  Coronary heart disease (CHD)or equivalent (10-year risk of CHD >20%):  Non-HDL cholesterol goal     <130 mg/dL  Two or more CHD risk factors and 10-year risk of CHD <= 20%:  Non-HDL cholesterol goal     <160 mg/dL  0 to 1 CHD risk factor:  Non-HDL cholesterol goal     <190 mg/dL      PSA, Screen 10/04/2023 0.72  0.00 - 4.00 ng/mL Final    Comment: The testing method is a chemiluminescent microparticle immunoassay   manufactured by [employer] and performed on the    or   [employer] system. Values obtained with different assay manufacturers   for   methods may be different and cannot be used interchangeably.  PSA  Expected levels:  Hormonal Therapy: <0.05 ng/ml  Prostatectomy: <0.01 ng/ml  Radiation Therapy: <1.00 ng/ml     Lab Visit on 09/25/2023   Component Date Value Ref Range Status    Prothrombin Time 09/25/2023 23.0 (H)  9.0 - 12.5 sec Final    INR 09/25/2023 2.3 (H)  0.8 - 1.2 Final    Comment: Coumadin Therapy:  2.0 - 3.0 for INR for all indicators except mechanical heart valves  and antiphospholipid syndromes which should use 2.5 - 3.5.         Assessment:       Problem List Items Addressed This Visit       Anti-phospholipid antibody syndrome (Chronic)    Other forms of systemic lupus erythematosus    Drug-induced immunodeficiency    Aortic atherosclerosis     Other Visit Diagnoses       Routine general medical examination at health care facility    -  Primary    PAC (premature atrial contraction)        PVC (premature ventricular contraction)        Immunization due        Relevant Orders    (In Office Administered) Pneumococcal Conjugate Vaccine (20 Valent) (IM) (Preferred) (Completed)    Encounter for FIT (fecal immunochemical test) screening        Relevant Orders    Fecal Immunochemical Test (iFOBT)            Plan:           Viraj was seen today for annual exam.    Diagnoses and all orders for this visit:    Routine general medical examination at health care facility    Anti-phospholipid antibody syndrome    PAC (premature atrial contraction)    PVC (premature ventricular contraction)    Other systemic lupus erythematosus with other organ involvement    Immunization due  -     (In Office Administered) Pneumococcal Conjugate Vaccine (20 Valent) (IM) (Preferred)    Encounter for FIT (fecal immunochemical test) screening  -     Fecal Immunochemical Test (iFOBT); Future    Drug-induced immunodeficiency    Aortic atherosclerosis      Wellness check  -normal exam  -labs      APAS/LUPUS  -on plaquenil and coumadin  -follows Rheumatology    Heart murmur  -echo for surveillance    Spent adequate time in obtaining  history and detailed exam    Follow up in about 1 year (around 10/17/2024), or if symptoms worsen or fail to improve.

## 2023-10-24 ENCOUNTER — OFFICE VISIT (OUTPATIENT)
Dept: OTOLARYNGOLOGY | Facility: CLINIC | Age: 73
End: 2023-10-24
Payer: MEDICARE

## 2023-10-24 VITALS
SYSTOLIC BLOOD PRESSURE: 95 MMHG | HEIGHT: 70 IN | HEART RATE: 78 BPM | BODY MASS INDEX: 25.44 KG/M2 | WEIGHT: 177.69 LBS | DIASTOLIC BLOOD PRESSURE: 60 MMHG

## 2023-10-24 DIAGNOSIS — C43.22 MALIGNANT MELANOMA OF LEFT PINNA: Primary | ICD-10-CM

## 2023-10-24 PROCEDURE — 11642 PR EXC SKIN MALIG 1.1-2 CM FACE,FACIAL: ICD-10-PCS | Mod: HCNC,S$GLB,, | Performed by: STUDENT IN AN ORGANIZED HEALTH CARE EDUCATION/TRAINING PROGRAM

## 2023-10-24 PROCEDURE — 99999 PR PBB SHADOW E&M-EST. PATIENT-LVL III: ICD-10-PCS | Mod: PBBFAC,HCNC,, | Performed by: STUDENT IN AN ORGANIZED HEALTH CARE EDUCATION/TRAINING PROGRAM

## 2023-10-24 PROCEDURE — 12051 INTMD RPR FACE/MM 2.5 CM/<: CPT | Mod: HCNC,51,S$GLB, | Performed by: STUDENT IN AN ORGANIZED HEALTH CARE EDUCATION/TRAINING PROGRAM

## 2023-10-24 PROCEDURE — 88342 CHG IMMUNOCYTOCHEMISTRY: ICD-10-PCS | Mod: 26,HCNC,, | Performed by: PATHOLOGY

## 2023-10-24 PROCEDURE — 1159F PR MEDICATION LIST DOCUMENTED IN MEDICAL RECORD: ICD-10-PCS | Mod: HCNC,CPTII,S$GLB, | Performed by: STUDENT IN AN ORGANIZED HEALTH CARE EDUCATION/TRAINING PROGRAM

## 2023-10-24 PROCEDURE — 1126F PR PAIN SEVERITY QUANTIFIED, NO PAIN PRESENT: ICD-10-PCS | Mod: HCNC,CPTII,S$GLB, | Performed by: STUDENT IN AN ORGANIZED HEALTH CARE EDUCATION/TRAINING PROGRAM

## 2023-10-24 PROCEDURE — 3008F PR BODY MASS INDEX (BMI) DOCUMENTED: ICD-10-PCS | Mod: HCNC,CPTII,S$GLB, | Performed by: STUDENT IN AN ORGANIZED HEALTH CARE EDUCATION/TRAINING PROGRAM

## 2023-10-24 PROCEDURE — 88305 TISSUE EXAM BY PATHOLOGIST: ICD-10-PCS | Mod: 26,HCNC,, | Performed by: PATHOLOGY

## 2023-10-24 PROCEDURE — 3078F DIAST BP <80 MM HG: CPT | Mod: HCNC,CPTII,S$GLB, | Performed by: STUDENT IN AN ORGANIZED HEALTH CARE EDUCATION/TRAINING PROGRAM

## 2023-10-24 PROCEDURE — 1126F AMNT PAIN NOTED NONE PRSNT: CPT | Mod: HCNC,CPTII,S$GLB, | Performed by: STUDENT IN AN ORGANIZED HEALTH CARE EDUCATION/TRAINING PROGRAM

## 2023-10-24 PROCEDURE — 3078F PR MOST RECENT DIASTOLIC BLOOD PRESSURE < 80 MM HG: ICD-10-PCS | Mod: HCNC,CPTII,S$GLB, | Performed by: STUDENT IN AN ORGANIZED HEALTH CARE EDUCATION/TRAINING PROGRAM

## 2023-10-24 PROCEDURE — 99215 OFFICE O/P EST HI 40 MIN: CPT | Mod: 25,HCNC,S$GLB, | Performed by: STUDENT IN AN ORGANIZED HEALTH CARE EDUCATION/TRAINING PROGRAM

## 2023-10-24 PROCEDURE — 3008F BODY MASS INDEX DOCD: CPT | Mod: HCNC,CPTII,S$GLB, | Performed by: STUDENT IN AN ORGANIZED HEALTH CARE EDUCATION/TRAINING PROGRAM

## 2023-10-24 PROCEDURE — 12051 PR INTERMED WOUND REPAIR FACE/EAR/EYELID/NOSE/LIP/MUC MEBR, 2.5CM OR LESS: ICD-10-PCS | Mod: HCNC,51,S$GLB, | Performed by: STUDENT IN AN ORGANIZED HEALTH CARE EDUCATION/TRAINING PROGRAM

## 2023-10-24 PROCEDURE — 3074F PR MOST RECENT SYSTOLIC BLOOD PRESSURE < 130 MM HG: ICD-10-PCS | Mod: HCNC,CPTII,S$GLB, | Performed by: STUDENT IN AN ORGANIZED HEALTH CARE EDUCATION/TRAINING PROGRAM

## 2023-10-24 PROCEDURE — 88342 IMHCHEM/IMCYTCHM 1ST ANTB: CPT | Mod: HCNC | Performed by: PATHOLOGY

## 2023-10-24 PROCEDURE — 88305 TISSUE EXAM BY PATHOLOGIST: CPT | Mod: 26,HCNC,, | Performed by: PATHOLOGY

## 2023-10-24 PROCEDURE — 99215 PR OFFICE/OUTPT VISIT, EST, LEVL V, 40-54 MIN: ICD-10-PCS | Mod: 25,HCNC,S$GLB, | Performed by: STUDENT IN AN ORGANIZED HEALTH CARE EDUCATION/TRAINING PROGRAM

## 2023-10-24 PROCEDURE — 88305 TISSUE EXAM BY PATHOLOGIST: CPT | Mod: HCNC | Performed by: PATHOLOGY

## 2023-10-24 PROCEDURE — 88342 IMHCHEM/IMCYTCHM 1ST ANTB: CPT | Mod: 26,HCNC,, | Performed by: PATHOLOGY

## 2023-10-24 PROCEDURE — 3074F SYST BP LT 130 MM HG: CPT | Mod: HCNC,CPTII,S$GLB, | Performed by: STUDENT IN AN ORGANIZED HEALTH CARE EDUCATION/TRAINING PROGRAM

## 2023-10-24 PROCEDURE — 11642 EXC F/E/E/N/L MAL+MRG 1.1-2: CPT | Mod: HCNC,S$GLB,, | Performed by: STUDENT IN AN ORGANIZED HEALTH CARE EDUCATION/TRAINING PROGRAM

## 2023-10-24 PROCEDURE — 1159F MED LIST DOCD IN RCRD: CPT | Mod: HCNC,CPTII,S$GLB, | Performed by: STUDENT IN AN ORGANIZED HEALTH CARE EDUCATION/TRAINING PROGRAM

## 2023-10-24 PROCEDURE — 99999 PR PBB SHADOW E&M-EST. PATIENT-LVL III: CPT | Mod: PBBFAC,HCNC,, | Performed by: STUDENT IN AN ORGANIZED HEALTH CARE EDUCATION/TRAINING PROGRAM

## 2023-10-24 RX ORDER — CEFADROXIL 500 MG/1
500 CAPSULE ORAL EVERY 12 HOURS
Qty: 10 CAPSULE | Refills: 0 | Status: SHIPPED | OUTPATIENT
Start: 2023-10-24 | End: 2023-10-29

## 2023-10-24 NOTE — PROGRESS NOTES
"PROCEDURE: wide local excision of melanoma with complex closure    INDICATION: Melanoma of the left helix    REFERRING PROVIDER: Cha Becerril M.D.    ANESTHETIC: 5 cc 1% Lidocaine with Epinephrine 1:100,000    SURGICAL PREP: Betadine    SURGEON: Radha Beck MD    ASSISTANTS: None    LOCATION: Left helix    DEFECT SIZE: 1.3 cm    WOUND REPAIR/DISPOSITION:  The patient was prepped, draped, and anesthetized in the usual sterile fashion. The area previously biopsy by Dr. Becerril was identified and a 0.5cm margin was drawn.A #15 blade was used to excise the area and it was sent for pathologic analysis. Hemostasis was achieved with electrocautery.    After the patient's carcinoma had been completely removed, a repair of the surgical defect was undertaken. A small amount of restructuring and undermining was performed in order to allow for adequate closure. The flap was advanced in to close the primary defect by a complex closure utilizing 4-0 Monocryl buried interrupted sutures placed in the cartilage, followed by the subcutaneous and dermal plane. 4-0 Monocryl buried interrupted sutures were placed in the subcutaneous and dermal plane defect of the posterior ear. The cutaneous wound edges of the flap were closed using a combination of interrupted and mattressed 5-0 Prolene and 5-0 fast absorbing gut sutures.    A bolster of vaseline gauze was secured into place with a 3-0 suture.    The patient tolerated the procedure well.    The area was cleaned and dressed appropriately and the patient was given wound care instructions, as well as an appointment for follow-up evaluation and suture removal in 7 days.    Vitals:    10/24/23 1525   BP: 95/60   BP Location: Left arm   Patient Position: Sitting   BP Method: Large (Automatic)   Pulse: 78   Weight: 80.6 kg (177 lb 11.1 oz)   Height: 5' 10" (1.778 m)         " 18

## 2023-10-26 ENCOUNTER — PATIENT MESSAGE (OUTPATIENT)
Dept: ADMINISTRATIVE | Facility: HOSPITAL | Age: 73
End: 2023-10-26
Payer: MEDICARE

## 2023-10-30 ENCOUNTER — ANTI-COAG VISIT (OUTPATIENT)
Dept: CARDIOLOGY | Facility: CLINIC | Age: 73
End: 2023-10-30
Payer: MEDICARE

## 2023-10-30 ENCOUNTER — LAB VISIT (OUTPATIENT)
Dept: LAB | Facility: HOSPITAL | Age: 73
End: 2023-10-30
Attending: INTERNAL MEDICINE
Payer: MEDICARE

## 2023-10-30 DIAGNOSIS — M32.19 OTHER SYSTEMIC LUPUS ERYTHEMATOSUS WITH OTHER ORGAN INVOLVEMENT: ICD-10-CM

## 2023-10-30 DIAGNOSIS — Z79.01 LONG TERM CURRENT USE OF ANTICOAGULANT THERAPY: ICD-10-CM

## 2023-10-30 DIAGNOSIS — D68.61 ANTI-PHOSPHOLIPID ANTIBODY SYNDROME: Chronic | ICD-10-CM

## 2023-10-30 DIAGNOSIS — D68.61 ANTI-PHOSPHOLIPID ANTIBODY SYNDROME: Primary | Chronic | ICD-10-CM

## 2023-10-30 LAB
INR PPP: 2.2 (ref 0.8–1.2)
PROTHROMBIN TIME: 22.1 SEC (ref 9–12.5)

## 2023-10-30 PROCEDURE — 85610 PROTHROMBIN TIME: CPT | Mod: HCNC | Performed by: INTERNAL MEDICINE

## 2023-10-30 PROCEDURE — 93793 PR ANTICOAGULANT MGMT FOR PT TAKING WARFARIN: ICD-10-PCS | Mod: S$GLB,,,

## 2023-10-30 PROCEDURE — 36415 COLL VENOUS BLD VENIPUNCTURE: CPT | Mod: HCNC,PO | Performed by: INTERNAL MEDICINE

## 2023-10-30 PROCEDURE — 93793 ANTICOAG MGMT PT WARFARIN: CPT | Mod: S$GLB,,,

## 2023-10-31 ENCOUNTER — OFFICE VISIT (OUTPATIENT)
Dept: OTOLARYNGOLOGY | Facility: CLINIC | Age: 73
End: 2023-10-31
Payer: MEDICARE

## 2023-10-31 VITALS
WEIGHT: 182.75 LBS | BODY MASS INDEX: 26.16 KG/M2 | HEIGHT: 70 IN | HEART RATE: 68 BPM | DIASTOLIC BLOOD PRESSURE: 72 MMHG | SYSTOLIC BLOOD PRESSURE: 123 MMHG

## 2023-10-31 DIAGNOSIS — C43.22 MALIGNANT MELANOMA OF LEFT PINNA: Primary | ICD-10-CM

## 2023-10-31 PROCEDURE — 1159F MED LIST DOCD IN RCRD: CPT | Mod: HCNC,CPTII,S$GLB, | Performed by: STUDENT IN AN ORGANIZED HEALTH CARE EDUCATION/TRAINING PROGRAM

## 2023-10-31 PROCEDURE — 3078F PR MOST RECENT DIASTOLIC BLOOD PRESSURE < 80 MM HG: ICD-10-PCS | Mod: HCNC,CPTII,S$GLB, | Performed by: STUDENT IN AN ORGANIZED HEALTH CARE EDUCATION/TRAINING PROGRAM

## 2023-10-31 PROCEDURE — 99999 PR PBB SHADOW E&M-EST. PATIENT-LVL III: ICD-10-PCS | Mod: PBBFAC,HCNC,, | Performed by: STUDENT IN AN ORGANIZED HEALTH CARE EDUCATION/TRAINING PROGRAM

## 2023-10-31 PROCEDURE — 99999 PR PBB SHADOW E&M-EST. PATIENT-LVL III: CPT | Mod: PBBFAC,HCNC,, | Performed by: STUDENT IN AN ORGANIZED HEALTH CARE EDUCATION/TRAINING PROGRAM

## 2023-10-31 PROCEDURE — 3074F SYST BP LT 130 MM HG: CPT | Mod: HCNC,CPTII,S$GLB, | Performed by: STUDENT IN AN ORGANIZED HEALTH CARE EDUCATION/TRAINING PROGRAM

## 2023-10-31 PROCEDURE — 3008F BODY MASS INDEX DOCD: CPT | Mod: HCNC,CPTII,S$GLB, | Performed by: STUDENT IN AN ORGANIZED HEALTH CARE EDUCATION/TRAINING PROGRAM

## 2023-10-31 PROCEDURE — 1126F AMNT PAIN NOTED NONE PRSNT: CPT | Mod: HCNC,CPTII,S$GLB, | Performed by: STUDENT IN AN ORGANIZED HEALTH CARE EDUCATION/TRAINING PROGRAM

## 2023-10-31 PROCEDURE — 1126F PR PAIN SEVERITY QUANTIFIED, NO PAIN PRESENT: ICD-10-PCS | Mod: HCNC,CPTII,S$GLB, | Performed by: STUDENT IN AN ORGANIZED HEALTH CARE EDUCATION/TRAINING PROGRAM

## 2023-10-31 PROCEDURE — 99024 POSTOP FOLLOW-UP VISIT: CPT | Mod: HCNC,S$GLB,, | Performed by: STUDENT IN AN ORGANIZED HEALTH CARE EDUCATION/TRAINING PROGRAM

## 2023-10-31 PROCEDURE — 3078F DIAST BP <80 MM HG: CPT | Mod: HCNC,CPTII,S$GLB, | Performed by: STUDENT IN AN ORGANIZED HEALTH CARE EDUCATION/TRAINING PROGRAM

## 2023-10-31 PROCEDURE — 3008F PR BODY MASS INDEX (BMI) DOCUMENTED: ICD-10-PCS | Mod: HCNC,CPTII,S$GLB, | Performed by: STUDENT IN AN ORGANIZED HEALTH CARE EDUCATION/TRAINING PROGRAM

## 2023-10-31 PROCEDURE — 99024 PR POST-OP FOLLOW-UP VISIT: ICD-10-PCS | Mod: HCNC,S$GLB,, | Performed by: STUDENT IN AN ORGANIZED HEALTH CARE EDUCATION/TRAINING PROGRAM

## 2023-10-31 PROCEDURE — 3074F PR MOST RECENT SYSTOLIC BLOOD PRESSURE < 130 MM HG: ICD-10-PCS | Mod: HCNC,CPTII,S$GLB, | Performed by: STUDENT IN AN ORGANIZED HEALTH CARE EDUCATION/TRAINING PROGRAM

## 2023-10-31 PROCEDURE — 1159F PR MEDICATION LIST DOCUMENTED IN MEDICAL RECORD: ICD-10-PCS | Mod: HCNC,CPTII,S$GLB, | Performed by: STUDENT IN AN ORGANIZED HEALTH CARE EDUCATION/TRAINING PROGRAM

## 2023-10-31 NOTE — PROGRESS NOTES
Note to patients: In accordance with the  Century Cures Act, patients are now granted immediate electronic access to their medical records. This note is primarily intended for communication among medical professionals. As a result, it may incorporate medical terminology, abbreviations, or language that could appear blunt or unfamiliar. If you have questions about this document, we encourage you to discuss it with your physician.      [facility]  [facility]    Patient: Viraj Olsen    : 1950    MRN: [id number]  Primary Care Provider: Mitch Rojas MD  Referring Provider/Derm: No ref. provider found   Date of Encounter: 10/31/2023    DIAGNOSIS: melanoma    CC:   Chief Complaint   Patient presents with    Follow-up       HPI:   Viraj Olsen is a 73 y.o. male with a past medical history significant for SLE/antiphospholipid syndrome who is being seen today for melanoma of the left pinna. The lesion was noted by dermatology, who performed a biopsy of the area.    Patient denies other symptoms. Of note, patient also has a left neck lesion (BCC) and is scheduled for Mohs in November.    Patient lives in [place].    Interval:  Patient underwent WLE of the previously biopsied melanoma. His pain has been minimal. He presents today for bolster removal.    TREATMENT HISTORY:  N/A    ALLERGIES:  Review of patient's allergies indicates:  No Known Allergies      MEDICATIONS:    Current Outpatient Medications:     aspirin (ECOTRIN) 81 MG EC tablet, Take 81 mg by mouth once daily., Disp: , Rfl:     atorvastatin (LIPITOR) 40 MG tablet, TAKE 1 TABLET BY MOUTH EVERY DAY, Disp: 90 tablet, Rfl: 3    b complex vitamins capsule, Take 1 capsule by mouth once daily., Disp: , Rfl:     calcium carbonate 220 mg capsule, Take 500 mg by mouth once daily., Disp: , Rfl:     CALCIUM CARBONATE/VITAMIN D3 (VITAMIN D-3 ORAL), Take 1 capsule by mouth once daily., Disp: , Rfl:     celecoxib (CELEBREX) 200 MG  capsule, TAKE 1 CAPSULE BY MOUTH DAILY AS NEEDED FOR PAIN., Disp: 20 capsule, Rfl: 0    ferrous sulfate 325 (65 FE) MG EC tablet, Take 325 mg by mouth once daily., Disp: , Rfl:     fish oil-omega-3 fatty acids 300-1,000 mg capsule, Take 1 capsule by mouth once daily., Disp: , Rfl:     FLUZONE HIGH-DOSE 2018-19, PF, 180 mcg/0.5 mL vaccine, ADM 0.5ML IM UTD, Disp: , Rfl: 0    hydrOXYchloroQUINE (PLAQUENIL) 200 mg tablet, TAKE 1 TABLET BY MOUTH TWICE A DAY, Disp: 180 tablet, Rfl: 1    melatonin 3 mg Tab, , Disp: , Rfl:     multivitamin (THERAGRAN) per tablet, Take 1 tablet by mouth once daily., Disp: , Rfl:     mupirocin (BACTROBAN) 2 % ointment, Apply to affected area 3 times daily, Disp: 22 g, Rfl: 1    TRANSDERM-SCOP 1 mg over 3 days, UNW AND PHU UTD, Disp: , Rfl: 1    warfarin (JANTOVEN) 5 MG tablet, TAKE 2 AND 1/2 TABLETS DAILY EXCEPT TAKE 2 TABLETS ON TUESDAY, THURSDAY AND SUNDAY OR AS DIRECTED BY COUMADIN CLINIC., Disp: 195 tablet, Rfl: 0    zinc sulfate (ZINCATE) 50 mg zinc (220 mg) capsule, Take 220 mg by mouth once daily., Disp: , Rfl:     albuterol (PROVENTIL/VENTOLIN HFA) 90 mcg/actuation inhaler, Inhale 2 puffs into the lungs every 6 (six) hours as needed for Wheezing or Shortness of Breath. Rescue, Disp: 8 g, Rfl: 0    PAST MEDICAL HISTORY:  Past Medical History:   Diagnosis Date    Anti-phospholipid antibody syndrome 6/21/2012    Cataract     Leukopenia 6/7/2013    SLE associated    Livedo reticularis 6/7/2013    Lupus     Post-traumatic osteoarthritis of knee 4/1/2020        PAST SURGICAL HISTORY:  Past Surgical History:   Procedure Laterality Date    abdondectomy      APPENDECTOMY      COLONOSCOPY N/A 12/28/2016    Procedure: COLONOSCOPY;  Surgeon: Hillary García MD;  Location: [facility];  Service: Endoscopy;  Laterality: N/A;    TONSILLECTOMY          FAMILY HISTORY:  Family History   Problem Relation Age of Onset    Hypertension Father     Heart disease Father     Lupus Mother     No Known  "Problems Sister     No Known Problems Brother     No Known Problems Maternal Aunt     No Known Problems Maternal Uncle     No Known Problems Paternal Aunt     No Known Problems Paternal Uncle     No Known Problems Maternal Grandmother     No Known Problems Maternal Grandfather     No Known Problems Paternal Grandmother     No Known Problems Paternal Grandfather     Amblyopia Neg Hx     Blindness Neg Hx     Cancer Neg Hx     Cataracts Neg Hx     Diabetes Neg Hx     Glaucoma Neg Hx     Macular degeneration Neg Hx     Retinal detachment Neg Hx     Strabismus Neg Hx     Stroke Neg Hx     Thyroid disease Neg Hx        SOCIAL HISTORY:  Social History     Socioeconomic History    Marital status:    Tobacco Use    Smoking status: Never     Passive exposure: Never    Smokeless tobacco: Never   Substance and Sexual Activity    Alcohol use: Yes     Comment: social    Drug use: No    Sexual activity: Yes     Partners: Female     Social Determinants of Health     Stress: No Stress Concern Present (12/2/2019)    [employer] [place] [employer] - Occupational Stress Questionnaire     Feeling of Stress : Not at all     See above substance history    REVIEW OF SYSTEMS:   Comprehensive review of systems was discussed with the patient.  It is positive only for the above complaints.    PHYSICAL EXAMINATION:  Blood pressure 123/72, pulse 68, height 5' 10" (1.778 m), weight 82.9 kg (182 lb 12.2 oz).    Constitutional: Non-toxic appearing.   Psychiatric: Appropriate mood and affect. Cooperative.  Voice: Non-dysphonic, speaking in full sentences.   Neurologic: Cranial nerves grossly intact, no focal deficits.  Head and face: Salivary glands are not enlarged. Face is symmetric. CN VII strength and sensation intact.  Skin/Ear: Bolster removed from left ear, crusting and scabbing removed, suture line intact, no concerns for auricular hematoma or fluid collection, cartilage nontender  Eyes: Vision grossly intact, bilateral " extraocular movements intact  Ears: Bilateral pinna, mastoid, external ear canal normal. Hearing intact.   Nose: External nose appears normal.   Lips: No ulcers or lesions  Neck: Soft and flat,  no masses or lymphadenopathy.   Respiratory: Chest expansion symmetric, no audible stridor or stertor. Breathing is unlabored. No active cough.    PREOP CLINICAL PHOTOS:      DATA REVIEWED:   PATHOLOGY:  Skin, left ear helix, shave biopsy:   -MALIGNANT MELANOMA, NON-ULCERATED, at least 0.6 MM IN DEPTH (at least pT1a), see synoptic report below     SYNOPTIC REPORT   Procedure: biopsy, shave   Specimen Laterality:  Left   Tumor Site:  Ear helix   Macroscopic Satellite Nodule(s): Not identified   Histologic Type:  Lentigo maligna type   Maximum Tumor (Breslow) Thickness:  at least 0.6 mm   Ulceration: Not identified   Microsatellite(s): Not identified   Margins:   Peripheral:  Involved by malignant melanoma in-situ.   Deep: Involved by invasive melanoma.   Mitotic rate: <1 mm2   Anatomic (Mark) level: at least III   Lympho-vascular invasion: Not identified   Neurotropism: Not identified   Tumor infilt rating lymphocytes: Present, non-brisk   Tumor regression: Not identified     Pathologic Stage Classification (pTNM, AJCC 8th Ed): at least pT1a     PATH PENDING FROM RE-EXCISION    ASSESSMENT AND PLAN:  1. Malignant melanoma of left pinna      Viraj Olsen is a 73 y.o. male with melanoma of the left helix, s/p shave biopsy and surgical excision 10/24. Healing appropriately, path still pending.   - Will call patient with path results. Discussion possibility of no cancer in specimen, full excision, or need for revision excision  - Return to clinic next week for suture removal  - Use peroxide to clean suture line and apply ointment twice daily    Patient encouraged to call with any questions, concerns, or new or worsening symptoms.     Follow up next week for suture removal

## 2023-11-06 ENCOUNTER — OFFICE VISIT (OUTPATIENT)
Dept: OTOLARYNGOLOGY | Facility: CLINIC | Age: 73
End: 2023-11-06
Payer: MEDICARE

## 2023-11-06 VITALS
DIASTOLIC BLOOD PRESSURE: 76 MMHG | WEIGHT: 179.69 LBS | HEART RATE: 69 BPM | SYSTOLIC BLOOD PRESSURE: 127 MMHG | BODY MASS INDEX: 25.78 KG/M2

## 2023-11-06 DIAGNOSIS — C43.22 MALIGNANT MELANOMA OF LEFT PINNA: Primary | ICD-10-CM

## 2023-11-06 PROCEDURE — 1159F MED LIST DOCD IN RCRD: CPT | Mod: HCNC,CPTII,S$GLB, | Performed by: NURSE PRACTITIONER

## 2023-11-06 PROCEDURE — 99999 PR PBB SHADOW E&M-EST. PATIENT-LVL III: CPT | Mod: PBBFAC,HCNC,, | Performed by: NURSE PRACTITIONER

## 2023-11-06 PROCEDURE — 3074F SYST BP LT 130 MM HG: CPT | Mod: HCNC,CPTII,S$GLB, | Performed by: NURSE PRACTITIONER

## 2023-11-06 PROCEDURE — 99024 POSTOP FOLLOW-UP VISIT: CPT | Mod: HCNC,S$GLB,, | Performed by: NURSE PRACTITIONER

## 2023-11-06 PROCEDURE — 3074F PR MOST RECENT SYSTOLIC BLOOD PRESSURE < 130 MM HG: ICD-10-PCS | Mod: HCNC,CPTII,S$GLB, | Performed by: NURSE PRACTITIONER

## 2023-11-06 PROCEDURE — 1159F PR MEDICATION LIST DOCUMENTED IN MEDICAL RECORD: ICD-10-PCS | Mod: HCNC,CPTII,S$GLB, | Performed by: NURSE PRACTITIONER

## 2023-11-06 PROCEDURE — 1126F PR PAIN SEVERITY QUANTIFIED, NO PAIN PRESENT: ICD-10-PCS | Mod: HCNC,CPTII,S$GLB, | Performed by: NURSE PRACTITIONER

## 2023-11-06 PROCEDURE — 1126F AMNT PAIN NOTED NONE PRSNT: CPT | Mod: HCNC,CPTII,S$GLB, | Performed by: NURSE PRACTITIONER

## 2023-11-06 PROCEDURE — 99024 PR POST-OP FOLLOW-UP VISIT: ICD-10-PCS | Mod: HCNC,S$GLB,, | Performed by: NURSE PRACTITIONER

## 2023-11-06 PROCEDURE — 99999 PR PBB SHADOW E&M-EST. PATIENT-LVL III: ICD-10-PCS | Mod: PBBFAC,HCNC,, | Performed by: NURSE PRACTITIONER

## 2023-11-06 PROCEDURE — 3078F DIAST BP <80 MM HG: CPT | Mod: HCNC,CPTII,S$GLB, | Performed by: NURSE PRACTITIONER

## 2023-11-06 PROCEDURE — 3078F PR MOST RECENT DIASTOLIC BLOOD PRESSURE < 80 MM HG: ICD-10-PCS | Mod: HCNC,CPTII,S$GLB, | Performed by: NURSE PRACTITIONER

## 2023-11-06 NOTE — ASSESSMENT & PLAN NOTE
Doing well. Sutures removed. Questions answered. Dr. Beck to call with path report and next steps.

## 2023-11-06 NOTE — PROGRESS NOTES
Subjective     Patient ID: Viraj Olsen is a 73 y.o. male.    Chief Complaint: Post Op Suture Removal    HPI    Viraj Olsen returns for suture removal. Doing well. No complaints.    Past Medical History:   Diagnosis Date    Anti-phospholipid antibody syndrome 6/21/2012    Cataract     Leukopenia 6/7/2013    SLE associated    Livedo reticularis 6/7/2013    Lupus     Post-traumatic osteoarthritis of knee 4/1/2020       Past Surgical History:   Procedure Laterality Date    abdondectomy      APPENDECTOMY      COLONOSCOPY N/A 12/28/2016    Procedure: COLONOSCOPY;  Surgeon: Hillary García MD;  Location[facility];  Service: Endoscopy;  Laterality: N/A;    TONSILLECTOMY           Current Outpatient Medications:     albuterol (PROVENTIL/VENTOLIN HFA) 90 mcg/actuation inhaler, Inhale 2 puffs into the lungs every 6 (six) hours as needed for Wheezing or Shortness of Breath. Rescue, Disp: 8 g, Rfl: 0    aspirin (ECOTRIN) 81 MG EC tablet, Take 81 mg by mouth once daily., Disp: , Rfl:     atorvastatin (LIPITOR) 40 MG tablet, TAKE 1 TABLET BY MOUTH EVERY DAY, Disp: 90 tablet, Rfl: 3    b complex vitamins capsule, Take 1 capsule by mouth once daily., Disp: , Rfl:     calcium carbonate 220 mg capsule, Take 500 mg by mouth once daily., Disp: , Rfl:     CALCIUM CARBONATE/VITAMIN D3 (VITAMIN D-3 ORAL), Take 1 capsule by mouth once daily., Disp: , Rfl:     celecoxib (CELEBREX) 200 MG capsule, TAKE 1 CAPSULE BY MOUTH DAILY AS NEEDED FOR PAIN., Disp: 20 capsule, Rfl: 0    ferrous sulfate 325 (65 FE) MG EC tablet, Take 325 mg by mouth once daily., Disp: , Rfl:     fish oil-omega-3 fatty acids 300-1,000 mg capsule, Take 1 capsule by mouth once daily., Disp: , Rfl:     FLUZONE HIGH-DOSE 2018-19, PF, 180 mcg/0.5 mL vaccine, ADM 0.5ML IM UTD, Disp: , Rfl: 0    hydrOXYchloroQUINE (PLAQUENIL) 200 mg tablet, TAKE 1 TABLET BY MOUTH TWICE A DAY, Disp: 180 tablet, Rfl: 1    melatonin 3 mg Tab, , Disp: , Rfl:     multivitamin (THERAGRAN)  per tablet, Take 1 tablet by mouth once daily., Disp: , Rfl:     mupirocin (BACTROBAN) 2 % ointment, Apply to affected area 3 times daily, Disp: 22 g, Rfl: 1    TRANSDERM-SCOP 1 mg over 3 days, UNW AND PHU UTD, Disp: , Rfl: 1    warfarin (JANTOVEN) 5 MG tablet, TAKE 2 AND 1/2 TABLETS DAILY EXCEPT TAKE 2 TABLETS ON TUESDAY, THURSDAY AND SUNDAY OR AS DIRECTED BY COUMADIN CLINIC., Disp: 195 tablet, Rfl: 0    zinc sulfate (ZINCATE) 50 mg zinc (220 mg) capsule, Take 220 mg by mouth once daily., Disp: , Rfl:     Review of patient's allergies indicates:  No Known Allergies    Social History     Socioeconomic History    Marital status:    Tobacco Use    Smoking status: Never     Passive exposure: Never    Smokeless tobacco: Never   Substance and Sexual Activity    Alcohol use: Yes     Comment: social    Drug use: No    Sexual activity: Yes     Partners: Female     Social Determinants of Health     Stress: No Stress Concern Present (12/2/2019)    [employer] [place] [employer] - Occupational Stress Questionnaire     Feeling of Stress : Not at all       Family History   Problem Relation Age of Onset    Hypertension Father     Heart disease Father     Lupus Mother     No Known Problems Sister     No Known Problems Brother     No Known Problems Maternal Aunt     No Known Problems Maternal Uncle     No Known Problems Paternal Aunt     No Known Problems Paternal Uncle     No Known Problems Maternal Grandmother     No Known Problems Maternal Grandfather     No Known Problems Paternal Grandmother     No Known Problems Paternal Grandfather     Amblyopia Neg Hx     Blindness Neg Hx     Cancer Neg Hx     Cataracts Neg Hx     Diabetes Neg Hx     Glaucoma Neg Hx     Macular degeneration Neg Hx     Retinal detachment Neg Hx     Strabismus Neg Hx     Stroke Neg Hx     Thyroid disease Neg Hx          Review of Systems   Constitutional:  Negative for appetite change, chills, diaphoresis, fatigue, fever and unexpected weight  change.   HENT:  Negative for nasal congestion, dental problem, drooling, ear discharge, ear pain, facial swelling, hearing loss, mouth sores, nosebleeds, postnasal drip, rhinorrhea, sinus pressure/congestion, sneezing, sore throat, tinnitus, trouble swallowing and voice change.    Eyes:  Negative for pain, discharge, redness and itching.   Respiratory:  Negative for cough and shortness of breath.    Cardiovascular:  Negative for chest pain.   Gastrointestinal:  Negative for abdominal distention, abdominal pain, diarrhea, nausea and vomiting.   Endocrine: Negative for cold intolerance and heat intolerance.   Genitourinary:  Negative for difficulty urinating.   Musculoskeletal:  Negative for neck pain and neck stiffness.   Integumentary:  Negative for rash.   Neurological:  Negative for dizziness, weakness and headaches.   Hematological:  Negative for adenopathy.          Objective     Physical Exam  Constitutional:       Appearance: Normal appearance.   HENT:      Head: Normocephalic and atraumatic.      Right Ear: External ear normal.      Left Ear: External ear normal.      Ears:      Comments: Incision CDI.  No redness, drainage, or fluid collection noted.  Edges well approximated.  Sutures removed.  Pulmonary:      Effort: Pulmonary effort is normal. No respiratory distress.   Neurological:      General: No focal deficit present.      Mental Status: He is alert.   Psychiatric:         Mood and Affect: Mood normal.         Behavior: Behavior normal.         Thought Content: Thought content normal.          Assessment and Plan     1. Malignant melanoma of left pinna  Assessment & Plan:  Doing well. Sutures removed. Questions answered. Dr. Beck to call with path report and next steps.          No follow-ups on file.

## 2023-11-08 ENCOUNTER — PATIENT MESSAGE (OUTPATIENT)
Dept: ADMINISTRATIVE | Facility: HOSPITAL | Age: 73
End: 2023-11-08
Payer: MEDICARE

## 2023-11-08 ENCOUNTER — PATIENT OUTREACH (OUTPATIENT)
Dept: ADMINISTRATIVE | Facility: HOSPITAL | Age: 73
End: 2023-11-08
Payer: MEDICARE

## 2023-11-08 NOTE — PROGRESS NOTES
Care Everywhere updates requested and reviewed.  Immunizations reconciled. Media reports reviewed.  Duplicate HM overrides and  orders removed.  Overdue HM topic chart audit and/or requested.       Health Maintenance Due   Topic Date Due    Shingles Vaccine (1 of 2) Never done    RSV Vaccine (Age 60+) (1 - 1-dose 60+ series) Never done    DEXA Scan  2021    COVID-19 Vaccine (2023-24 season) 2023    Colorectal Cancer Screening  2023

## 2023-11-09 LAB
FINAL PATHOLOGIC DIAGNOSIS: NORMAL
GROSS: NORMAL
Lab: NORMAL
MICROSCOPIC EXAM: NORMAL

## 2023-11-10 NOTE — PROGRESS NOTES
Discussed excisional biopsy results with patient via telephone. Follow-up with dermatology. See me PRN if wound healing issues with ear.

## 2023-11-13 ENCOUNTER — LAB VISIT (OUTPATIENT)
Dept: LAB | Facility: HOSPITAL | Age: 73
End: 2023-11-13
Payer: MEDICARE

## 2023-11-13 ENCOUNTER — TELEPHONE (OUTPATIENT)
Dept: TRANSPLANT | Facility: CLINIC | Age: 73
End: 2023-11-13
Payer: MEDICARE

## 2023-11-13 ENCOUNTER — ANTI-COAG VISIT (OUTPATIENT)
Dept: CARDIOLOGY | Facility: CLINIC | Age: 73
End: 2023-11-13
Payer: MEDICARE

## 2023-11-13 ENCOUNTER — PROCEDURE VISIT (OUTPATIENT)
Dept: DERMATOLOGY | Facility: CLINIC | Age: 73
End: 2023-11-13
Payer: MEDICARE

## 2023-11-13 VITALS
SYSTOLIC BLOOD PRESSURE: 132 MMHG | DIASTOLIC BLOOD PRESSURE: 81 MMHG | HEART RATE: 76 BPM | HEIGHT: 70 IN | BODY MASS INDEX: 25.62 KG/M2 | WEIGHT: 179 LBS

## 2023-11-13 DIAGNOSIS — Z79.01 LONG TERM CURRENT USE OF ANTICOAGULANT THERAPY: ICD-10-CM

## 2023-11-13 DIAGNOSIS — D68.61 ANTI-PHOSPHOLIPID ANTIBODY SYNDROME: Chronic | ICD-10-CM

## 2023-11-13 DIAGNOSIS — C44.41 BASAL CELL CARCINOMA OF LEFT SIDE OF NECK: Primary | ICD-10-CM

## 2023-11-13 DIAGNOSIS — D68.61 ANTI-PHOSPHOLIPID ANTIBODY SYNDROME: Primary | Chronic | ICD-10-CM

## 2023-11-13 DIAGNOSIS — M32.19 OTHER SYSTEMIC LUPUS ERYTHEMATOSUS WITH OTHER ORGAN INVOLVEMENT: ICD-10-CM

## 2023-11-13 LAB
INR PPP: 5.7 (ref 0.8–1.2)
PROTHROMBIN TIME: 54.8 SEC (ref 9–12.5)

## 2023-11-13 PROCEDURE — 13132 CMPLX RPR F/C/C/M/N/AX/G/H/F: CPT | Mod: HCNC,51,S$GLB, | Performed by: DERMATOLOGY

## 2023-11-13 PROCEDURE — 36415 COLL VENOUS BLD VENIPUNCTURE: CPT | Mod: HCNC | Performed by: INTERNAL MEDICINE

## 2023-11-13 PROCEDURE — 17312 MOHS ADDL STAGE: CPT | Mod: HCNC,S$GLB,, | Performed by: DERMATOLOGY

## 2023-11-13 PROCEDURE — 17311: ICD-10-PCS | Mod: HCNC,S$GLB,, | Performed by: DERMATOLOGY

## 2023-11-13 PROCEDURE — 17312: ICD-10-PCS | Mod: HCNC,S$GLB,, | Performed by: DERMATOLOGY

## 2023-11-13 PROCEDURE — 93793 PR ANTICOAGULANT MGMT FOR PT TAKING WARFARIN: ICD-10-PCS | Mod: S$GLB,,,

## 2023-11-13 PROCEDURE — 93793 ANTICOAG MGMT PT WARFARIN: CPT | Mod: S$GLB,,,

## 2023-11-13 PROCEDURE — 99499 UNLISTED E&M SERVICE: CPT | Mod: HCNC,S$GLB,, | Performed by: DERMATOLOGY

## 2023-11-13 PROCEDURE — 85610 PROTHROMBIN TIME: CPT | Mod: HCNC | Performed by: INTERNAL MEDICINE

## 2023-11-13 PROCEDURE — 13132 PR RECMPL WND HEAD,FAC,HAND 2.6-7.5 CM: ICD-10-PCS | Mod: HCNC,51,S$GLB, | Performed by: DERMATOLOGY

## 2023-11-13 PROCEDURE — 99499 NO LOS: ICD-10-PCS | Mod: HCNC,S$GLB,, | Performed by: DERMATOLOGY

## 2023-11-13 PROCEDURE — 17311 MOHS 1 STAGE H/N/HF/G: CPT | Mod: HCNC,S$GLB,, | Performed by: DERMATOLOGY

## 2023-11-13 NOTE — PROGRESS NOTES
PROCEDURE: Mohs' Micrographic Surgery    INDICATION: Biopsy-proven skin cancer of cosmetically and functionally important areas, including head, neck, genital, hand, foot, or areas known for having difficulty in healing, such as the lower anterior legs. Tumors with aggressive clinical behavior (rapidly growing, greater than 1 cm in diameter). Tumor with ill-defined borders.    REFERRING PROVIDER: Cha Becerril M.D.    CASE NUMBER:     ANESTHETIC: 6 cc 0.5% Lidocaine with Epi 1:200,000 mixed 1:1 with 0.5% Bupivacaine    SURGICAL PREP: Hibiclens    SURGEON: Tanya Alcantara MD    ASSISTANTS: Sil Avelar MA and Eloisa Roberts, Surg Tech    PREOPERATIVE DIAGNOSIS: basal cell carcinoma- nodular    POSTOPERATIVE DIAGNOSIS: basal cell carcinoma- superficial,  nodular    PATHOLOGIC DIAGNOSIS: basal cell carcinoma- superficial, nodular    HISTOLOGY OF SPECIMENS IN FIRST STAGE:   Debulking tumor confirms superficial and nodular basal cell carcinoma.  Tumor Type: Tumor seen. Superficial basal cell carcinoma: Foci of basaloid cells with peripheral palisading and focal retraction artifact arising along the dermoepidermal junction and extending into the papillary dermis.  Nodular basal cell carcinoma: Nodular tumor in dermis composed of basaloid cells exhibiting peripheral palisading and retraction artifact.   Depth of Invasion: epidermis and dermis  Perineural Invasion: No    HISTOLOGY OF SPECIMENS IN SUBSEQUENT STAGES:  Tumor Type:  No tumor seen.    STAGES OF MOHS' SURGERY PERFORMED: 2    TUMOR-FREE PLANE ACHIEVED: Yes    HEMOSTASIS: electrocoagulation     SPECIMENS: 4 (3 in stage A and 1 in stage B)    LOCATION: left postauricular neck. Location verified with Dr. Becerril's clinical photograph. Patient also verified location by looking at photo taken prior to procedure.     INITIAL LESION SIZE: 1.5 x 1.5 cm    FINAL DEFECT SIZE: 2.3 x 2.5 cm    WOUND REPAIR/DISPOSITION: The patient tolerated Mohs' Micrographic  Surgery for a basal cell carcinoma very well. When the tumor was completely removed, a repair of the surgical defect was undertaken.    PROCEDURE: Complex Linear Repair    INDICATION: Status post Mohs' Micrographic Surgery for basal cell carcinoma.    CASE NUMBER:     SURGEON: Tanya Alcantara MD    ASSISTANTS: Livia Hunt PA-C and Halle León    ANESTHETIC: 4.5 cc 1% Lidocaine with Epinephrine 1:200,000    SURGICAL PREP: Maryellen, prepped by  Halle Gee    LOCATION: left postauricular neck    DEFECT SIZE: 2.3 x 2.5 cm    WOUND REPAIR/DISPOSITION:  After the patient's carcinoma had been completely removed with Mohs' Micrographic Surgery, a repair of the surgical defect was undertaken. The patient was returned to the operating suite where the area of left postauricular neck was prepped, draped, and anesthetized in the usual sterile fashion. The wound was widely undermined in all directions. The wound was undermined to a distance at least the maximum width of the defect as measured perpendicular to the closure line along at least one entire edge of the defect, in this case 2.5 cm. Then, electrocoagulation was used to obtain meticulous hemostasis. 4-0 Vicryl buried vertical mattress sutures were placed into the subcutaneous and dermal plane to close the wound and reagan the cutaneous wound edge. Bilateral dog ears were identified and were removed by a standard Burow's triangle technique. The cutaneous wound edges were closed using running 4-0 Prolene suture.    The patient tolerated the procedure well.    The area was cleaned and dressed appropriately and the patient was given wound care instructions, as well as appointment for follow-up evaluation and suture removal in 7 days.    LENGTH OF REPAIR: 6.8 cm    Vitals:    11/13/23 1234 11/13/23 1238   BP: 129/74 132/81   BP Location: Right arm Left arm   Patient Position: Sitting Sitting   BP Method: Medium (Automatic) Medium (Automatic)  "  Pulse: 66 76   Weight: 81.2 kg (179 lb)    Height: 5' 10" (1.778 m)          "

## 2023-11-13 NOTE — PROGRESS NOTES
[facility]    2023 2:16 PM    Assessment/Plan:    Patient presents today with supratherapeutic INR.    Assessment of patient findings and chart review: INR very elevated. Patient reports taking dose higher than last advised but would not expect this level of elevated; could be some lab interference w/APS as well.    Recommendation for patient's warfarin regimen: Hold dose for 2 days and recommend some vitamin K intake today if possible    Recommend repeat INR in 2 days  _________________________________________________________________    Viraj Olsen (73 y.o.) is followed by the [facility].    Anticoagulation Summary  As of 2023      INR goal:  2.5-3.5   TTR:  69.6 % (11.3 y)   INR used for dosin.7 (2023)   Warfarin maintenance plan:  5 mg (5 mg x 1) every Mon, Fri; 10 mg (5 mg x 2) all other days   Weekly warfarin total:  60 mg   Plan last modified:  Camelia Christopher, PharmD (10/10/2023)   Next INR check:  11/15/2023   Target end date:      Indications    Other forms of systemic lupus erythematosus [M32.8]  Impending cerebrovascular accident (Resolved) [I63.9]  Anti-phospholipid antibody syndrome [D68.61]  Long term current use of anticoagulant therapy [Z79.01]                 Anticoagulation Episode Summary       INR check location:  Clinic Lab    Preferred lab:  [facility]    Send INR reminders to:  [facility] COUMADIN MONITORING POOL    Comments:  [facility] only Mon - Thu (INRs ) // Antiphospholipid - must go to lab          Anticoagulation Care Providers       Provider Role Specialty Phone number    Preet Quintero MD Responsible Rheumatology [phone]            Patient Findings       Positives:  Extra doses    Negatives:  Signs/symptoms of thrombosis, Signs/symptoms of bleeding, Laboratory test error suspected, Change in health, Change in alcohol  use, Change in activity, Upcoming invasive procedure, Emergency department visit, Upcoming dental procedure, Missed doses, Change in medications, Change in diet/appetite, Hospital admission, Bruising, Other complaints    Comments:  Patient questioned regarding elevated INR. Patient gave incorrect high dose of warfarin as 5 mg every Wed and 10 mg all other days. Patient should be taking 5 mg every Mon/Fri and 10 mg all other days. Patient denied other changes. Patient advised to visit ED in the event of s/sx of bleeding.

## 2023-11-15 ENCOUNTER — ANTI-COAG VISIT (OUTPATIENT)
Dept: CARDIOLOGY | Facility: CLINIC | Age: 73
End: 2023-11-15
Payer: MEDICARE

## 2023-11-15 ENCOUNTER — LAB VISIT (OUTPATIENT)
Dept: LAB | Facility: HOSPITAL | Age: 73
End: 2023-11-15
Attending: INTERNAL MEDICINE
Payer: MEDICARE

## 2023-11-15 DIAGNOSIS — D68.61 ANTI-PHOSPHOLIPID ANTIBODY SYNDROME: Chronic | ICD-10-CM

## 2023-11-15 DIAGNOSIS — M32.19 OTHER SYSTEMIC LUPUS ERYTHEMATOSUS WITH OTHER ORGAN INVOLVEMENT: ICD-10-CM

## 2023-11-15 DIAGNOSIS — Z79.01 LONG TERM CURRENT USE OF ANTICOAGULANT THERAPY: ICD-10-CM

## 2023-11-15 DIAGNOSIS — M32.19 OTHER SYSTEMIC LUPUS ERYTHEMATOSUS WITH OTHER ORGAN INVOLVEMENT: Primary | ICD-10-CM

## 2023-11-15 LAB
INR PPP: 2.4 (ref 0.8–1.2)
PROTHROMBIN TIME: 24.3 SEC (ref 9–12.5)

## 2023-11-15 PROCEDURE — 36415 COLL VENOUS BLD VENIPUNCTURE: CPT | Mod: HCNC,PO | Performed by: INTERNAL MEDICINE

## 2023-11-15 PROCEDURE — 93793 ANTICOAG MGMT PT WARFARIN: CPT | Mod: S$GLB,,,

## 2023-11-15 PROCEDURE — 85610 PROTHROMBIN TIME: CPT | Mod: HCNC | Performed by: INTERNAL MEDICINE

## 2023-11-15 PROCEDURE — 93793 PR ANTICOAGULANT MGMT FOR PT TAKING WARFARIN: ICD-10-PCS | Mod: S$GLB,,,

## 2023-11-15 NOTE — PROGRESS NOTES
[facility]    11/15/2023 4:25 PM    Assessment/Plan:    Patient presents today with subtherapeutic  INR.    Assessment of patient findings and chart review: INR much improved from critical high level 2 days ago and only just barely below goal. Though patient reported dose different than advised when critical INR resulted, weekly dose taken was not significantly different than dose advised. Will rechallenge advised maintenance plan.    Recommendation for patient's warfarin regimen: Continue current maintenance dose    Recommend repeat INR in 1 week  _________________________________________________________________    Viraj Olsen (73 y.o.) is followed by the [facility].    Anticoagulation Summary  As of 11/15/2023      INR goal:  2.5-3.5   TTR:  69.5 % (11.3 y)   INR used for dosin.4 (11/15/2023)   Warfarin maintenance plan:  5 mg (5 mg x 1) every Mon, Fri; 10 mg (5 mg x 2) all other days   Weekly warfarin total:  60 mg   Plan last modified:  Camelia Christopher, PharmD (10/10/2023)   Next INR check:  2023   Target end date:      Indications    Other forms of systemic lupus erythematosus [M32.8]  Impending cerebrovascular accident (Resolved) [I63.9]  Anti-phospholipid antibody syndrome [D68.61]  Long term current use of anticoagulant therapy [Z79.01]                 Anticoagulation Episode Summary       INR check location:  Clinic Lab    Preferred lab:  [facility]    Send INR reminders to:  [facility] COUMADIN MONITORING POOL    Comments:  [facility] only Mon - Thu (INRs ) // Antiphospholipid - must go to lab          Anticoagulation Care Providers       Provider Role Specialty Phone number    Preet Quintero MD Responsible Rheumatology [phone]

## 2023-11-20 ENCOUNTER — LAB VISIT (OUTPATIENT)
Dept: LAB | Facility: HOSPITAL | Age: 73
End: 2023-11-20
Attending: INTERNAL MEDICINE
Payer: MEDICARE

## 2023-11-20 ENCOUNTER — OFFICE VISIT (OUTPATIENT)
Dept: DERMATOLOGY | Facility: CLINIC | Age: 73
End: 2023-11-20
Payer: MEDICARE

## 2023-11-20 DIAGNOSIS — Z09 POSTOP CHECK: Primary | ICD-10-CM

## 2023-11-20 DIAGNOSIS — Z79.01 LONG TERM CURRENT USE OF ANTICOAGULANT THERAPY: ICD-10-CM

## 2023-11-20 DIAGNOSIS — D68.61 ANTI-PHOSPHOLIPID ANTIBODY SYNDROME: Chronic | ICD-10-CM

## 2023-11-20 DIAGNOSIS — M32.19 OTHER SYSTEMIC LUPUS ERYTHEMATOSUS WITH OTHER ORGAN INVOLVEMENT: ICD-10-CM

## 2023-11-20 LAB
INR PPP: 2.6 (ref 0.8–1.2)
PROTHROMBIN TIME: 26.7 SEC (ref 9–12.5)

## 2023-11-20 PROCEDURE — 99024 PR POST-OP FOLLOW-UP VISIT: ICD-10-PCS | Mod: HCNC,S$GLB,, | Performed by: DERMATOLOGY

## 2023-11-20 PROCEDURE — 1159F PR MEDICATION LIST DOCUMENTED IN MEDICAL RECORD: ICD-10-PCS | Mod: HCNC,CPTII,S$GLB, | Performed by: DERMATOLOGY

## 2023-11-20 PROCEDURE — 1101F PR PT FALLS ASSESS DOC 0-1 FALLS W/OUT INJ PAST YR: ICD-10-PCS | Mod: HCNC,CPTII,S$GLB, | Performed by: DERMATOLOGY

## 2023-11-20 PROCEDURE — 1101F PT FALLS ASSESS-DOCD LE1/YR: CPT | Mod: HCNC,CPTII,S$GLB, | Performed by: DERMATOLOGY

## 2023-11-20 PROCEDURE — 3288F FALL RISK ASSESSMENT DOCD: CPT | Mod: HCNC,CPTII,S$GLB, | Performed by: DERMATOLOGY

## 2023-11-20 PROCEDURE — 85610 PROTHROMBIN TIME: CPT | Mod: HCNC | Performed by: INTERNAL MEDICINE

## 2023-11-20 PROCEDURE — 1126F PR PAIN SEVERITY QUANTIFIED, NO PAIN PRESENT: ICD-10-PCS | Mod: HCNC,CPTII,S$GLB, | Performed by: DERMATOLOGY

## 2023-11-20 PROCEDURE — 1159F MED LIST DOCD IN RCRD: CPT | Mod: HCNC,CPTII,S$GLB, | Performed by: DERMATOLOGY

## 2023-11-20 PROCEDURE — 99999 PR PBB SHADOW E&M-EST. PATIENT-LVL III: CPT | Mod: PBBFAC,HCNC,, | Performed by: DERMATOLOGY

## 2023-11-20 PROCEDURE — 3288F PR FALLS RISK ASSESSMENT DOCUMENTED: ICD-10-PCS | Mod: HCNC,CPTII,S$GLB, | Performed by: DERMATOLOGY

## 2023-11-20 PROCEDURE — 99024 POSTOP FOLLOW-UP VISIT: CPT | Mod: HCNC,S$GLB,, | Performed by: DERMATOLOGY

## 2023-11-20 PROCEDURE — 99999 PR PBB SHADOW E&M-EST. PATIENT-LVL III: ICD-10-PCS | Mod: PBBFAC,HCNC,, | Performed by: DERMATOLOGY

## 2023-11-20 PROCEDURE — 36415 COLL VENOUS BLD VENIPUNCTURE: CPT | Mod: HCNC,PO | Performed by: INTERNAL MEDICINE

## 2023-11-20 PROCEDURE — 1126F AMNT PAIN NOTED NONE PRSNT: CPT | Mod: HCNC,CPTII,S$GLB, | Performed by: DERMATOLOGY

## 2023-11-20 NOTE — PROGRESS NOTES
73 y.o. male patient is here for suture removal following Mohs' surgery.    Patient reports no problems.    WOUND PE:  The left postauricular neck sutures intact. Wound healing well. Good skin edges. No signs or symptoms of infection.      IMPRESSION:  Healing operative site from Mohs' surgery BCC left postauricular neck s/p Mohs with CLC, postop day #7.    PLAN:  Sutures removed today by  Oriana Oneal RN . Steri-strips applied.  Continue wound care.  Keep moist with Aquaphor.  Call if any issues arise    RTC:  In 3-6 months with Cha Becerril M.D. for skin check or sooner if new concern arises.

## 2023-11-21 ENCOUNTER — ANTI-COAG VISIT (OUTPATIENT)
Dept: CARDIOLOGY | Facility: CLINIC | Age: 73
End: 2023-11-21
Payer: MEDICARE

## 2023-11-21 DIAGNOSIS — Z79.01 LONG TERM CURRENT USE OF ANTICOAGULANT THERAPY: ICD-10-CM

## 2023-11-21 DIAGNOSIS — M32.19 OTHER SYSTEMIC LUPUS ERYTHEMATOSUS WITH OTHER ORGAN INVOLVEMENT: Primary | ICD-10-CM

## 2023-11-21 DIAGNOSIS — D68.61 ANTI-PHOSPHOLIPID ANTIBODY SYNDROME: Chronic | ICD-10-CM

## 2023-11-21 PROCEDURE — 93793 PR ANTICOAGULANT MGMT FOR PT TAKING WARFARIN: ICD-10-PCS | Mod: S$GLB,,,

## 2023-11-21 PROCEDURE — 93793 ANTICOAG MGMT PT WARFARIN: CPT | Mod: S$GLB,,,

## 2023-11-21 NOTE — PROGRESS NOTES
11/21/23 patient called and was given lab result, verified warfarin dose: 10mg daily except 5mg Friday only, reports no other changes

## 2023-11-21 NOTE — PROGRESS NOTES
[facility]    2023 8:13 AM    Assessment/Plan:    Patient presents today with therapeutic INR.    Assessment of patient findings and chart review: no significant change noted since last INR    Recommendation for patient's warfarin regimen: Continue current maintenance dose    Recommend repeat INR in 2 weeks  _________________________________________________________________    Viraj Pretty (73 y.o.) is followed by the [facility] [employer] [facility].    Anticoagulation Summary  As of 2023      INR goal:  2.5-3.5   TTR:  69.5 % (11.3 y)   INR used for dosin.6 (2023)   Warfarin maintenance plan:  5 mg (5 mg x 1) every Mon, Fri; 10 mg (5 mg x 2) all other days   Weekly warfarin total:  60 mg   Plan last modified:  Camelia Christopher, PharmD (10/10/2023)   Next INR check:  2023   Target end date:      Indications    Other forms of systemic lupus erythematosus [M32.8]  Impending cerebrovascular accident (Resolved) [I63.9]  Anti-phospholipid antibody syndrome [D68.61]  Long term current use of anticoagulant therapy [Z79.01]                 Anticoagulation Episode Summary       INR check location:  Clinic Lab    Preferred lab:  [facility]    Send INR reminders to:  [facility] COUMADIN MONITORING POOL    Comments:  [facility] only Mon - Thu (INRs ) // Antiphospholipid - must go to lab          Anticoagulation Care Providers       Provider Role Specialty Phone number    Preet Quintero MD Responsible Rheumatology [phone]

## 2023-11-28 ENCOUNTER — LAB VISIT (OUTPATIENT)
Dept: LAB | Facility: HOSPITAL | Age: 73
End: 2023-11-28
Attending: INTERNAL MEDICINE
Payer: MEDICARE

## 2023-11-28 ENCOUNTER — ANTI-COAG VISIT (OUTPATIENT)
Dept: CARDIOLOGY | Facility: CLINIC | Age: 73
End: 2023-11-28
Payer: MEDICARE

## 2023-11-28 ENCOUNTER — OFFICE VISIT (OUTPATIENT)
Dept: URGENT CARE | Facility: CLINIC | Age: 73
End: 2023-11-28
Payer: MEDICARE

## 2023-11-28 VITALS
TEMPERATURE: 98 F | HEART RATE: 81 BPM | WEIGHT: 179 LBS | SYSTOLIC BLOOD PRESSURE: 110 MMHG | RESPIRATION RATE: 19 BRPM | HEIGHT: 70 IN | DIASTOLIC BLOOD PRESSURE: 69 MMHG | OXYGEN SATURATION: 96 % | BODY MASS INDEX: 25.62 KG/M2

## 2023-11-28 DIAGNOSIS — H61.21 IMPACTED CERUMEN OF RIGHT EAR: ICD-10-CM

## 2023-11-28 DIAGNOSIS — J30.2 SEASONAL ALLERGIC RHINITIS, UNSPECIFIED TRIGGER: Primary | ICD-10-CM

## 2023-11-28 DIAGNOSIS — D68.61 ANTI-PHOSPHOLIPID ANTIBODY SYNDROME: Chronic | ICD-10-CM

## 2023-11-28 DIAGNOSIS — M32.19 OTHER SYSTEMIC LUPUS ERYTHEMATOSUS WITH OTHER ORGAN INVOLVEMENT: Primary | ICD-10-CM

## 2023-11-28 DIAGNOSIS — Z79.01 LONG TERM CURRENT USE OF ANTICOAGULANT THERAPY: ICD-10-CM

## 2023-11-28 DIAGNOSIS — M32.19 OTHER SYSTEMIC LUPUS ERYTHEMATOSUS WITH OTHER ORGAN INVOLVEMENT: ICD-10-CM

## 2023-11-28 DIAGNOSIS — R05.9 COUGH, UNSPECIFIED TYPE: ICD-10-CM

## 2023-11-28 LAB
CTP QC/QA: YES
INR PPP: 4.9 (ref 0.8–1.2)
PROTHROMBIN TIME: 48 SEC (ref 9–12.5)
SARS-COV-2 AG RESP QL IA.RAPID: NEGATIVE

## 2023-11-28 PROCEDURE — 93793 ANTICOAG MGMT PT WARFARIN: CPT | Mod: S$GLB,,,

## 2023-11-28 PROCEDURE — 87811 SARS-COV-2 COVID19 W/OPTIC: CPT | Mod: QW,S$GLB,,

## 2023-11-28 PROCEDURE — 87811 SARS CORONAVIRUS 2 ANTIGEN POCT, MANUAL READ: ICD-10-PCS | Mod: QW,S$GLB,,

## 2023-11-28 PROCEDURE — 85610 PROTHROMBIN TIME: CPT | Mod: HCNC | Performed by: INTERNAL MEDICINE

## 2023-11-28 PROCEDURE — 99213 OFFICE O/P EST LOW 20 MIN: CPT | Mod: S$GLB,,,

## 2023-11-28 PROCEDURE — 36415 COLL VENOUS BLD VENIPUNCTURE: CPT | Mod: HCNC,PO | Performed by: INTERNAL MEDICINE

## 2023-11-28 PROCEDURE — 99213 PR OFFICE/OUTPT VISIT, EST, LEVL III, 20-29 MIN: ICD-10-PCS | Mod: S$GLB,,,

## 2023-11-28 PROCEDURE — 93793 PR ANTICOAGULANT MGMT FOR PT TAKING WARFARIN: ICD-10-PCS | Mod: S$GLB,,,

## 2023-11-28 RX ORDER — CETIRIZINE HYDROCHLORIDE 10 MG/1
10 TABLET ORAL DAILY
Qty: 30 TABLET | Refills: 11 | Status: SHIPPED | OUTPATIENT
Start: 2023-11-28 | End: 2024-11-27

## 2023-11-28 NOTE — PATIENT INSTRUCTIONS
Please drink plenty of fluids.  Please get plenty of rest.  Please return here or go to the Emergency Department for any concerns or worsening of condition.  If you do not have Hypertension or any history of palpitations, it is ok to take over the counter Sudafed or Mucinex D or Allegra-D or Claritin-D or Zyrtec-D.  If you do take one of the above, it is ok to combine that with plain over the counter Mucinex or Allegra or Claritin or Zyrtec.  If for example you are taking Zyrtec -D, you can combine that with Mucinex, but not Mucinex-D.  If you are taking Mucinex-D, you can combine that with plain Allegra or Claritin or Zyrtec.   If you do have Hypertension or palpitations, it is safe to take Coricidin HBP for relief of sinus symptoms.  We recommend you take over the counter Flonase (Fluticasone) or another nasally inhaled steroid or Afrin unless you are already taking one.  Continue daily nasal spray and take Zyrtec daily for the next 10 days.  Patient can any Mucinex as needed for cough relief.  Nasal irrigation with a saline spray or Netti Pot like device per their directions is also recommended.  If not allergic, please take over the counter Tylenol (Acetaminophen) and/or Motrin (Ibuprofen) as directed for control of pain and/or fever.  Please follow up with your primary care doctor or specialist as needed.    If you  smoke, please stop smoking.

## 2023-11-28 NOTE — PROGRESS NOTES
"Subjective:      Patient ID: Viraj Olsen is a 73 y.o. male.    Vitals:  height is 5' 10" (1.778 m) and weight is 81.2 kg (179 lb). His oral temperature is 97.7 °F (36.5 °C). His blood pressure is 110/69 and his pulse is 81. His respiration is 19 and oxygen saturation is 96%.     Chief Complaint: Cough    74 y/o male presents to the clinic with a chief complaint of post nasal drip, hoarse voice, and cough. Symptoms started one day ago. He notes that cough is just from the [facility]. He notes symptoms worsen throughout the day. Sx are not keeping patient up at night. Patient has been taken Mucinex with some relief. Denies any recent ill exposures.  Denies any recent travel.  Patient does have a  history of seasonal allergies and usually takes mucinex as needed. Denies numbness or tingling. Denies radiation of pain. Denies fever, chills, body aches, chest pain, shortness of breath, abdominal pain, nausea, vomiting, diarrhea, or rashes.             Cough  This is a new problem. The current episode started yesterday. The problem has been gradually worsening. The problem occurs every few minutes. The cough is Non-productive. Associated symptoms include nasal congestion and postnasal drip. Pertinent negatives include no chest pain, chills, ear pain, eye redness, fever, headaches, myalgias, rash, sore throat, shortness of breath or wheezing. Nothing aggravates the symptoms. Treatments tried: Musinex. The treatment provided mild relief. There is no history of asthma, bronchitis, COPD or environmental allergies.       Constitution: Negative for activity change, appetite change, chills, sweating, fatigue, fever and generalized weakness.   HENT:  Positive for postnasal drip and voice change. Negative for ear pain, ear discharge, tinnitus, congestion, sinus pain, sinus pressure, sore throat and trouble swallowing.    Neck: Negative for neck pain, neck stiffness and neck swelling.   Cardiovascular:  Negative for chest pain and " palpitations.   Eyes:  Negative for eye itching, eye pain and eye redness.   Respiratory:  Positive for cough. Negative for sputum production, shortness of breath and wheezing.    Gastrointestinal:  Negative for abdominal pain, nausea, vomiting, constipation and diarrhea.   Genitourinary:  Negative for dysuria, frequency and urgency.   Musculoskeletal:  Negative for pain and muscle ache.   Skin:  Negative for rash.   Allergic/Immunologic: Positive for seasonal allergies. Negative for environmental allergies and sneezing.   Neurological:  Negative for dizziness, light-headedness and headaches.   Psychiatric/Behavioral:  Negative for confusion.       Objective:     Physical Exam   Constitutional: He is oriented to person, place, and time. He appears well-developed. He is cooperative.  Non-toxic appearance. He does not appear ill. No distress.   HENT:   Head: Normocephalic and atraumatic.   Ears:   Right Ear: Hearing, tympanic membrane, external ear and ear canal normal. There is cerumen present. No no drainage, swelling or tenderness. No middle ear effusion.   Left Ear: Hearing, tympanic membrane, external ear and ear canal normal. No no drainage, swelling or tenderness. Tympanic membrane is not injected, not scarred, not perforated, not erythematous, not retracted and not bulging.  No middle ear effusion.   Nose: Mucosal edema and rhinorrhea present. No sinus tenderness or nasal deformity. No epistaxis. Right sinus exhibits no maxillary sinus tenderness and no frontal sinus tenderness. Left sinus exhibits no maxillary sinus tenderness and no frontal sinus tenderness.   Mouth/Throat: Uvula is midline, oropharynx is clear and moist and mucous membranes are normal. No trismus in the jaw. Normal dentition. No uvula swelling. Cobblestoning present. No oropharyngeal exudate, posterior oropharyngeal edema or posterior oropharyngeal erythema.   Eyes: Conjunctivae and lids are normal. No scleral icterus. Extraocular movement  intact   Neck: Trachea normal and phonation normal. Neck supple. No edema present. No erythema present. No neck rigidity present.   Cardiovascular: Normal rate, regular rhythm, normal heart sounds and normal pulses.   Pulmonary/Chest: Effort normal and breath sounds normal. No stridor. No respiratory distress. He has no decreased breath sounds. He has no wheezes. He has no rhonchi. He has no rales.   Abdominal: Normal appearance.   Musculoskeletal: Normal range of motion.         General: No deformity. Normal range of motion.   Lymphadenopathy:     He has no cervical adenopathy.   Neurological: He is alert and oriented to person, place, and time. He exhibits normal muscle tone. Coordination normal.   Skin: Skin is warm, dry, intact, not diaphoretic and not pale.   Psychiatric: His speech is normal and behavior is normal. Judgment and thought content normal.   Nursing note and vitals reviewed.      Assessment:     1. Seasonal allergic rhinitis, unspecified trigger    2. Cough, unspecified type    3. Impacted cerumen of right ear        Plan:     Results for orders placed or performed in visit on 11/28/23   SARS Coronavirus 2 Antigen, POCT Manual Read   Result Value Ref Range    SARS Coronavirus 2 Antigen Negative Negative     Acceptable Yes        Seasonal allergic rhinitis, unspecified trigger  -     cetirizine (ZYRTEC) 10 MG tablet; Take 1 tablet (10 mg total) by mouth once daily.  Dispense: 30 tablet; Refill: 11    Cough, unspecified type  -     SARS Coronavirus 2 Antigen, POCT Manual Read    Impacted cerumen of right ear  -     Ear wax removal        I still was unable to visualize TM post ear wax cleaning, patient stated his ears currently not bothering him.  He notes that if his ear does start to bother him or he notices any hearing loss you will return to get ears cleaned again.  Patient can take OTC Acetaminophen (Tylenol) and/or Ibuprofen (Motrin) as needed for pain relief and/or fever relief.  Continue to drink plenty of fluids. Follow up with PCP.          Patient Instructions   Please drink plenty of fluids.  Please get plenty of rest.  Please return here or go to the Emergency Department for any concerns or worsening of condition.  If you do not have Hypertension or any history of palpitations, it is ok to take over the counter Sudafed or Mucinex D or Allegra-D or Claritin-D or Zyrtec-D.  If you do take one of the above, it is ok to combine that with plain over the counter Mucinex or Allegra or Claritin or Zyrtec.  If for example you are taking Zyrtec -D, you can combine that with Mucinex, but not Mucinex-D.  If you are taking Mucinex-D, you can combine that with plain Allegra or Claritin or Zyrtec.   If you do have Hypertension or palpitations, it is safe to take Coricidin HBP for relief of sinus symptoms.  We recommend you take over the counter Flonase (Fluticasone) or another nasally inhaled steroid or Afrin unless you are already taking one.  Continue daily nasal spray and take Zyrtec daily for the next 10 days.  Patient can any Mucinex as needed for cough relief.  Nasal irrigation with a saline spray or Netti Pot like device per their directions is also recommended.  If not allergic, please take over the counter Tylenol (Acetaminophen) and/or Motrin (Ibuprofen) as directed for control of pain and/or fever.  Please follow up with your primary care doctor or specialist as needed.    If you  smoke, please stop smoking.

## 2023-11-29 NOTE — PROGRESS NOTES
[facility]    2023 8:11 AM    Assessment/Plan:    Patient presents today with supratherapeutic INR.    Assessment of patient findings and chart review: INR again above goal and without any significant changes noted to account (patient does report suffering w/seasonal allergies and taking OTC remedies).Elevations could be exaggerated 2/2 APS, or patient could simply need lower weekly dose.    Recommendation for patient's warfarin regimen:  hold dose  then lower maintenance dose    Recommend repeat INR in 3 days  _________________________________________________________________    Viraj Olsen (73 y.o.) is followed by the [facility] [employer] [facility].    Anticoagulation Summary  As of 2023      INR goal:  2.5-3.5   TTR:  69.5 % (11.3 y)   INR used for dosin.9 (2023)   Warfarin maintenance plan:  5 mg (5 mg x 1) every Mon, Wed, Fri; 10 mg (5 mg x 2) all other days   Weekly warfarin total:  55 mg   Plan last modified:  Erna Cha, PharmD (2023)   Next INR check:  2023   Target end date:      Indications    Other forms of systemic lupus erythematosus [M32.8]  Impending cerebrovascular accident (Resolved) [I63.9]  Anti-phospholipid antibody syndrome [D68.61]  Long term current use of anticoagulant therapy [Z79.01]                 Anticoagulation Episode Summary       INR check location:  Clinic Lab    Preferred lab:  [facility]    Send INR reminders to:  [facility] COUMADIN MONITORING POOL    Comments:  [facility] only Mon - Thu (INRs ) // Antiphospholipid - must go to lab          Anticoagulation Care Providers       Provider Role Specialty Phone number    Preet Quintero MD Responsible Rheumatology [phone]            Patient Findings       Positives:  Change in health, Change in medications    Negatives:  Signs/symptoms of thrombosis, Signs/symptoms of  bleeding, Laboratory test error suspected, Change in alcohol use, Change in activity, Upcoming invasive procedure, Emergency department visit, Upcoming dental procedure, Missed doses, Extra doses, Change in diet/appetite, Hospital admission, Bruising, Other complaints    Comments:  Patient questioned regarding elevated INR. Proper warfarin dosing confirmed. Patient reports suffering with allergies. He has taking Mucinex-DM x 2 doses, and reports he is still having symptoms. Patient denied other. Patient also denied s/sx of bleeding. Of, note patient advised to hold 11/28 warfarin dose. Patient advised to visit ED in the event of s/sx of bleeding.

## 2023-11-30 ENCOUNTER — LAB VISIT (OUTPATIENT)
Dept: LAB | Facility: HOSPITAL | Age: 73
End: 2023-11-30
Attending: INTERNAL MEDICINE
Payer: MEDICARE

## 2023-11-30 ENCOUNTER — ANTI-COAG VISIT (OUTPATIENT)
Dept: CARDIOLOGY | Facility: CLINIC | Age: 73
End: 2023-11-30
Payer: MEDICARE

## 2023-11-30 DIAGNOSIS — D68.61 ANTI-PHOSPHOLIPID ANTIBODY SYNDROME: Chronic | ICD-10-CM

## 2023-11-30 DIAGNOSIS — M32.19 OTHER SYSTEMIC LUPUS ERYTHEMATOSUS WITH OTHER ORGAN INVOLVEMENT: ICD-10-CM

## 2023-11-30 DIAGNOSIS — D68.61 ANTI-PHOSPHOLIPID ANTIBODY SYNDROME: Primary | Chronic | ICD-10-CM

## 2023-11-30 DIAGNOSIS — Z79.01 LONG TERM CURRENT USE OF ANTICOAGULANT THERAPY: ICD-10-CM

## 2023-11-30 LAB
INR PPP: 2.1 (ref 0.8–1.2)
PROTHROMBIN TIME: 21.4 SEC (ref 9–12.5)

## 2023-11-30 PROCEDURE — 85610 PROTHROMBIN TIME: CPT | Mod: HCNC | Performed by: INTERNAL MEDICINE

## 2023-11-30 PROCEDURE — 93793 ANTICOAG MGMT PT WARFARIN: CPT | Mod: S$GLB,,,

## 2023-11-30 PROCEDURE — 36415 COLL VENOUS BLD VENIPUNCTURE: CPT | Mod: HCNC,PO | Performed by: INTERNAL MEDICINE

## 2023-11-30 PROCEDURE — 93793 PR ANTICOAGULANT MGMT FOR PT TAKING WARFARIN: ICD-10-PCS | Mod: S$GLB,,,

## 2023-11-30 RX ORDER — HYDROXYCHLOROQUINE SULFATE 200 MG/1
TABLET, FILM COATED ORAL
Qty: 180 TABLET | Refills: 1 | Status: SHIPPED | OUTPATIENT
Start: 2023-11-30

## 2023-12-01 NOTE — PROGRESS NOTES
[facility]    2023 7:40 AM    Assessment/Plan:    Patient presents today with subtherapeutic  INR.    Assessment of patient findings and chart review: INR now below goal after hold warfarin for very high INR - INR fluctuating frequency without clear cause    Recommendation for patient's warfarin regimen: Decrease maintenance dose    Recommend repeat INR in 1 week  _________________________________________________________________    Viraj Olsen (73 y.o.) is followed by the [facility] [employer] [facility].    Anticoagulation Summary  As of 2023      INR goal:  2.5-3.5   TTR:  69.4 % (11.3 y)   INR used for dosin.1 (2023)   Warfarin maintenance plan:  10 mg (5 mg x 2) every Mon, Wed, Fri; 5 mg (5 mg x 1) all other days   Weekly warfarin total:  50 mg   Plan last modified:  Erna Cha, PharmD (2023)   Next INR check:  2023   Target end date:      Indications    Other forms of systemic lupus erythematosus [M32.8]  Impending cerebrovascular accident (Resolved) [I63.9]  Anti-phospholipid antibody syndrome [D68.61]  Long term current use of anticoagulant therapy [Z79.01]                 Anticoagulation Episode Summary       INR check location:  Clinic Lab    Preferred lab:  [facility]    Send INR reminders to:  [facility] COUMADIN MONITORING POOL    Comments:  [facility] only Mon - Thu (INRs ) // Antiphospholipid - must go to lab          Anticoagulation Care Providers       Provider Role Specialty Phone number    Preet Quintero MD Responsible Rheumatology [phone]            Patient Findings       Positives:  Missed doses, Extra doses    Negatives:  Signs/symptoms of bleeding, Change in health, Change in alcohol use, Change in activity, Upcoming invasive procedure, Emergency department visit, Upcoming dental procedure, Change in medications, Change in diet/appetite,  Hospital admission, Bruising, Other complaints    Comments:  Pt verified dose: 0mg Tuesday and took 10mg yesterday Wednesday,

## 2023-12-07 ENCOUNTER — LAB VISIT (OUTPATIENT)
Dept: LAB | Facility: HOSPITAL | Age: 73
End: 2023-12-07
Attending: INTERNAL MEDICINE
Payer: MEDICARE

## 2023-12-07 ENCOUNTER — ANTI-COAG VISIT (OUTPATIENT)
Dept: CARDIOLOGY | Facility: CLINIC | Age: 73
End: 2023-12-07
Payer: MEDICARE

## 2023-12-07 DIAGNOSIS — D68.61 ANTI-PHOSPHOLIPID ANTIBODY SYNDROME: Primary | Chronic | ICD-10-CM

## 2023-12-07 DIAGNOSIS — Z79.01 LONG TERM CURRENT USE OF ANTICOAGULANT THERAPY: ICD-10-CM

## 2023-12-07 DIAGNOSIS — D68.61 ANTI-PHOSPHOLIPID ANTIBODY SYNDROME: Chronic | ICD-10-CM

## 2023-12-07 DIAGNOSIS — M32.19 OTHER SYSTEMIC LUPUS ERYTHEMATOSUS WITH OTHER ORGAN INVOLVEMENT: ICD-10-CM

## 2023-12-07 LAB
INR PPP: 2.3 (ref 0.8–1.2)
PROTHROMBIN TIME: 23.6 SEC (ref 9–12.5)

## 2023-12-07 PROCEDURE — 85610 PROTHROMBIN TIME: CPT | Mod: HCNC | Performed by: INTERNAL MEDICINE

## 2023-12-07 PROCEDURE — 36415 COLL VENOUS BLD VENIPUNCTURE: CPT | Mod: HCNC,PO | Performed by: INTERNAL MEDICINE

## 2023-12-07 PROCEDURE — 93793 ANTICOAG MGMT PT WARFARIN: CPT | Mod: S$GLB,,,

## 2023-12-07 PROCEDURE — 93793 PR ANTICOAGULANT MGMT FOR PT TAKING WARFARIN: ICD-10-PCS | Mod: S$GLB,,,

## 2023-12-07 NOTE — PROGRESS NOTES
[facility]    2023 3:26 PM    Assessment/Plan:    Patient presents today with subtherapeutic  INR.    Recommendation for patient's warfarin regimen: Boost dosing & re-assess     Recommend repeat INR in 1 week  _________________________________________________________________    Viraj Hughesley (73 y.o.) is followed by the [facility] [facility].    Anticoagulation Summary  As of 2023      INR goal:  2.5-3.5   TTR:  69.3 % (11.4 y)   INR used for dosin.3 (2023)   Warfarin maintenance plan:  10 mg (5 mg x 2) every Mon, Wed, Fri; 5 mg (5 mg x 1) all other days   Weekly warfarin total:  50 mg   Plan last modified:  Erna Cha, PharmD (2023)   Next INR check:  2023   Target end date:      Indications    Other forms of systemic lupus erythematosus [M32.8]  Impending cerebrovascular accident (Resolved) [I63.9]  Anti-phospholipid antibody syndrome [D68.61]  Long term current use of anticoagulant therapy [Z79.01]                 Anticoagulation Episode Summary       INR check location:  Clinic Lab    Preferred lab:  [facility]    Send INR reminders to:  [facility] COUMADIN MONITORING POOL    Comments:  [facility] only Mon - Thu (INRs ) // Antiphospholipid - must go to lab          Anticoagulation Care Providers       Provider Role Specialty Phone number    Preet Quintero MD Responsible Rheumatology [phone]                            
no

## 2023-12-08 PROCEDURE — 82274 ASSAY TEST FOR BLOOD FECAL: CPT | Mod: HCNC | Performed by: FAMILY MEDICINE

## 2023-12-14 ENCOUNTER — LAB VISIT (OUTPATIENT)
Dept: LAB | Facility: HOSPITAL | Age: 73
End: 2023-12-14
Attending: INTERNAL MEDICINE
Payer: MEDICARE

## 2023-12-14 ENCOUNTER — ANTI-COAG VISIT (OUTPATIENT)
Dept: CARDIOLOGY | Facility: CLINIC | Age: 73
End: 2023-12-14
Payer: MEDICARE

## 2023-12-14 DIAGNOSIS — D68.61 ANTI-PHOSPHOLIPID ANTIBODY SYNDROME: Chronic | ICD-10-CM

## 2023-12-14 DIAGNOSIS — Z79.01 LONG TERM CURRENT USE OF ANTICOAGULANT THERAPY: ICD-10-CM

## 2023-12-14 DIAGNOSIS — D68.61 ANTI-PHOSPHOLIPID ANTIBODY SYNDROME: Primary | Chronic | ICD-10-CM

## 2023-12-14 DIAGNOSIS — M32.19 OTHER SYSTEMIC LUPUS ERYTHEMATOSUS WITH OTHER ORGAN INVOLVEMENT: ICD-10-CM

## 2023-12-14 LAB
INR PPP: 2.4 (ref 0.8–1.2)
PROTHROMBIN TIME: 24.3 SEC (ref 9–12.5)

## 2023-12-14 PROCEDURE — 93793 ANTICOAG MGMT PT WARFARIN: CPT | Mod: S$GLB,,,

## 2023-12-14 PROCEDURE — 85610 PROTHROMBIN TIME: CPT | Mod: HCNC | Performed by: INTERNAL MEDICINE

## 2023-12-14 PROCEDURE — 36415 COLL VENOUS BLD VENIPUNCTURE: CPT | Mod: HCNC,PO | Performed by: INTERNAL MEDICINE

## 2023-12-14 PROCEDURE — 93793 PR ANTICOAGULANT MGMT FOR PT TAKING WARFARIN: ICD-10-PCS | Mod: S$GLB,,,

## 2023-12-15 NOTE — PROGRESS NOTES
[facility]    12/15/2023 2:17 PM    Assessment/Plan:    Patient presents today with subtherapeutic  INR.    Assessment of patient findings and chart review: INR just below goal and no changes noted since last INR - reluctant to increase dose 2/2 recent high INRs at similar weekly warfarin doses.    Recommendation for patient's warfarin regimen: Continue current maintenance dose    Recommend repeat INR in 2 weeks  _________________________________________________________________    Viraj Olsen (73 y.o.) is followed by the [facility].    Anticoagulation Summary  As of 2023      INR goal:  2.5-3.5   TTR:  69.2 % (11.4 y)   INR used for dosin.4 (2023)   Warfarin maintenance plan:  10 mg (5 mg x 2) every Mon, Wed, Fri; 5 mg (5 mg x 1) all other days   Weekly warfarin total:  50 mg   Plan last modified:  Erna Cha, PharmD (12/15/2023)   Next INR check:  2023   Target end date:      Indications    Other forms of systemic lupus erythematosus [M32.8]  Impending cerebrovascular accident (Resolved) [I63.9]  Anti-phospholipid antibody syndrome [D68.61]  Long term current use of anticoagulant therapy [Z79.01]                 Anticoagulation Episode Summary       INR check location:  Clinic Lab    Preferred lab:  [facility]    Send INR reminders to:  [facility] COUMADIN MONITORING POOL    Comments:  [facility] [place] only Mon - Thu (INRs ) // Antiphospholipid - must go to lab          Anticoagulation Care Providers       Provider Role Specialty Phone number    Preet Quintero MD Responsible Rheumatology [phone]

## 2023-12-27 ENCOUNTER — ANTI-COAG VISIT (OUTPATIENT)
Dept: CARDIOLOGY | Facility: CLINIC | Age: 73
End: 2023-12-27
Payer: MEDICARE

## 2023-12-27 ENCOUNTER — LAB VISIT (OUTPATIENT)
Dept: LAB | Facility: HOSPITAL | Age: 73
End: 2023-12-27
Attending: INTERNAL MEDICINE
Payer: MEDICARE

## 2023-12-27 DIAGNOSIS — D68.61 ANTI-PHOSPHOLIPID ANTIBODY SYNDROME: Chronic | ICD-10-CM

## 2023-12-27 DIAGNOSIS — Z79.01 LONG TERM CURRENT USE OF ANTICOAGULANT THERAPY: ICD-10-CM

## 2023-12-27 DIAGNOSIS — M32.19 OTHER SYSTEMIC LUPUS ERYTHEMATOSUS WITH OTHER ORGAN INVOLVEMENT: Primary | ICD-10-CM

## 2023-12-27 DIAGNOSIS — M32.19 OTHER SYSTEMIC LUPUS ERYTHEMATOSUS WITH OTHER ORGAN INVOLVEMENT: ICD-10-CM

## 2023-12-27 LAB
INR PPP: 2.5 (ref 0.8–1.2)
PROTHROMBIN TIME: 25.7 SEC (ref 9–12.5)

## 2023-12-27 PROCEDURE — 36415 COLL VENOUS BLD VENIPUNCTURE: CPT | Mod: HCNC,PO | Performed by: INTERNAL MEDICINE

## 2023-12-27 PROCEDURE — 93793 ANTICOAG MGMT PT WARFARIN: CPT | Mod: S$GLB,,,

## 2023-12-27 PROCEDURE — 93793 PR ANTICOAGULANT MGMT FOR PT TAKING WARFARIN: ICD-10-PCS | Mod: S$GLB,,,

## 2023-12-27 PROCEDURE — 85610 PROTHROMBIN TIME: CPT | Mod: HCNC | Performed by: INTERNAL MEDICINE

## 2023-12-27 NOTE — PROGRESS NOTES
[facility]    2023 4:15 PM    Assessment/Plan:    Patient presents today with therapeutic INR.    Recommendation for patient's warfarin regimen: Continue current maintenance dose    Recommend repeat INR in 3 weeks  _________________________________________________________________    Viraj Pretty (73 y.o.) is followed by the [facility].    Anticoagulation Summary  As of 2023      INR goal:  2.5-3.5   TTR:  69.0 % (11.4 y)   INR used for dosin.5 (2023)   Warfarin maintenance plan:  10 mg (5 mg x 2) every Mon, Wed, Fri; 5 mg (5 mg x 1) all other days   Weekly warfarin total:  50 mg   No change documented:  Kayli Haley, PharmD   Plan last modified:  Erna Cha, PharmD (12/15/2023)   Next INR check:  2024   Target end date:      Indications    Other forms of systemic lupus erythematosus [M32.8]  Impending cerebrovascular accident (Resolved) [I63.9]  Anti-phospholipid antibody syndrome [D68.61]  Long term current use of anticoagulant therapy [Z79.01]                 Anticoagulation Episode Summary       INR check location:  Clinic Lab    Preferred lab:  [facility]    Send INR reminders to:  [facility] COUMADIN MONITORING POOL    Comments:  [facility] only Mon - Thu (INRs ) // Antiphospholipid - must go to lab          Anticoagulation Care Providers       Provider Role Specialty Phone number    Preet Quintero MD Responsible Rheumatology [phone]

## 2024-01-05 ENCOUNTER — OFFICE VISIT (OUTPATIENT)
Dept: DERMATOLOGY | Facility: CLINIC | Age: 74
End: 2024-01-05
Payer: MEDICARE

## 2024-01-05 DIAGNOSIS — L81.4 LENTIGO: ICD-10-CM

## 2024-01-05 DIAGNOSIS — D22.9 MULTIPLE BENIGN NEVI: ICD-10-CM

## 2024-01-05 DIAGNOSIS — D23.9 DERMATOFIBROMA: ICD-10-CM

## 2024-01-05 DIAGNOSIS — L82.1 SK (SEBORRHEIC KERATOSIS): ICD-10-CM

## 2024-01-05 DIAGNOSIS — Z85.820 HISTORY OF MALIGNANT MELANOMA OF SKIN: ICD-10-CM

## 2024-01-05 DIAGNOSIS — L57.0 AK (ACTINIC KERATOSIS): Primary | ICD-10-CM

## 2024-01-05 DIAGNOSIS — Z85.828 HISTORY OF NONMELANOMA SKIN CANCER: ICD-10-CM

## 2024-01-05 PROCEDURE — 1160F RVW MEDS BY RX/DR IN RCRD: CPT | Mod: HCNC,CPTII,S$GLB, | Performed by: DERMATOLOGY

## 2024-01-05 PROCEDURE — 99213 OFFICE O/P EST LOW 20 MIN: CPT | Mod: HCNC,S$GLB,, | Performed by: DERMATOLOGY

## 2024-01-05 PROCEDURE — 1159F MED LIST DOCD IN RCRD: CPT | Mod: HCNC,CPTII,S$GLB, | Performed by: DERMATOLOGY

## 2024-01-05 PROCEDURE — 3288F FALL RISK ASSESSMENT DOCD: CPT | Mod: HCNC,CPTII,S$GLB, | Performed by: DERMATOLOGY

## 2024-01-05 PROCEDURE — 1125F AMNT PAIN NOTED PAIN PRSNT: CPT | Mod: HCNC,CPTII,S$GLB, | Performed by: DERMATOLOGY

## 2024-01-05 PROCEDURE — 99999 PR PBB SHADOW E&M-EST. PATIENT-LVL III: CPT | Mod: PBBFAC,HCNC,, | Performed by: DERMATOLOGY

## 2024-01-05 PROCEDURE — 1101F PT FALLS ASSESS-DOCD LE1/YR: CPT | Mod: HCNC,CPTII,S$GLB, | Performed by: DERMATOLOGY

## 2024-01-05 NOTE — PROGRESS NOTES
Subjective:      Patient ID:  Viraj Olsen is a 73 y.o. male who presents for   Chief Complaint   Patient presents with    Skin Check     TBSE     History of Present Illness: The patient presents for follow up of skin check.    The patient was last seen on: 9/11/2023 for cryosurgery to actinic keratoses which have resolved and bx x3- left lateral knee c/w verruca vulgaris, left post auricular neck- BCC excised by SSW and left ear helix- MM 0.6mm excised by surg onc on 10/24/2023.    This is a high risk patient here to check for the development of new lesions.      Other skin complaints: none        Review of Systems   Skin:  Positive for activity-related sunscreen use and wears hat (always). Negative for daily sunscreen use and recent sunburn.   Hematologic/Lymphatic: Bruises/bleeds easily (on coumadin and baby asa).       Objective:   Physical Exam   Constitutional: He appears well-developed and well-nourished. No distress.   Neurological: He is alert and oriented to person, place, and time. He is not disoriented.   Psychiatric: He has a normal mood and affect.   Skin:   Areas Examined (abnormalities noted in diagram):   Scalp / Hair Palpated and Inspected  Head / Face Inspection Performed  Neck Inspection Performed  Chest / Axilla Inspection Performed  Abdomen Inspection Performed  Genitals / Buttocks / Groin Inspection Performed  Back Inspection Performed  RUE Inspected  LUE Inspection Performed  RLE Inspected  LLE Inspection Performed  Nails and Digits Inspection Performed                 Diagram Legend     Erythematous scaling macule/papule c/w actinic keratosis       Vascular papule c/w angioma      Pigmented verrucoid papule/plaque c/w seborrheic keratosis      Yellow umbilicated papule c/w sebaceous hyperplasia      Irregularly shaped tan macule c/w lentigo     1-2 mm smooth white papules consistent with Milia      Movable subcutaneous cyst with punctum c/w epidermal inclusion cyst      Subcutaneous  movable cyst c/w pilar cyst      Firm pink to brown papule c/w dermatofibroma      Pedunculated fleshy papule(s) c/w skin tag(s)      Evenly pigmented macule c/w junctional nevus     Mildly variegated pigmented, slightly irregular-bordered macule c/w mildly atypical nevus      Flesh colored to evenly pigmented papule c/w intradermal nevus       Pink pearly papule/plaque c/w basal cell carcinoma      Erythematous hyperkeratotic cursted plaque c/w SCC      Surgical scar with no sign of skin cancer recurrence      Open and closed comedones      Inflammatory papules and pustules      Verrucoid papule consistent consistent with wart     Erythematous eczematous patches and plaques     Dystrophic onycholytic nail with subungual debris c/w onychomycosis     Umbilicated papule    Erythematous-base heme-crusted tan verrucoid plaque consistent with inflamed seborrheic keratosis     Erythematous Silvery Scaling Plaque c/w Psoriasis     See annotation      Assessment / Plan:        AK (actinic keratosis)  Cryosurgery Procedure Note    Verbal consent from the patient is obtained including, but not limited to, risk of hypopigmentation/hyperpigmentation, scar, recurrence of lesion. The patient is aware of the precancerous quality and need for treatment of these lesions. Liquid nitrogen cryosurgery is applied to the 2 actinic keratoses, as detailed in the physical exam, to produce a freeze injury. The patient is aware that blisters may form and is instructed on wound care with gentle cleansing and use of vaseline ointment to keep moist until healed. The patient is supplied a handout on cryosurgery and is instructed to call if lesions do not completely resolve.      SK (seborrheic keratosis)  These are benign inherited growths without a malignant potential. Reassurance given to patient. No treatment is necessary.     Dermatofibroma  This is a benign scar-like lesion secondary to minor trauma. No treatment required.        Lentigo  This is a benign hyperpigmented sun induced lesion. Recommend daily sun protection/avoidance and use of at least SPF 30, broad spectrum sunscreen (OTC drug) will reduce the number of new lesions. Treatment of these benign lesions are considered cosmetic.      Multiple benign nevi   - minor problem and chronic.   Reassurance given to patient. No treatment necessary.       History of malignant melanoma of skin and History of nonmelanoma skin cancer  Area of previous melanoma and nmsc examined. Site well healed with no signs of recurrence.    Total body skin examination performed today including at least 12 points as noted in physical examination. No lesions suspicious for malignancy noted.    Recommend daily sun protection/avoidance, use of at least SPF 30, broad spectrum sunscreen (OTC drug), skin self examinations, and routine physician surveillance to optimize early detection               No follow-ups on file.

## 2024-01-10 ENCOUNTER — TELEPHONE (OUTPATIENT)
Dept: DERMATOLOGY | Facility: CLINIC | Age: 74
End: 2024-01-10
Payer: MEDICARE

## 2024-01-10 NOTE — TELEPHONE ENCOUNTER
Spoke to pt. Pt verbally agreed and confirmed date and time given. Pt thanked me.     ----- Message from Cha Becerril MD sent at 1/5/2024  9:52 AM CST -----  MM clinic April 2024

## 2024-01-11 DIAGNOSIS — Z00.00 ENCOUNTER FOR MEDICARE ANNUAL WELLNESS EXAM: ICD-10-CM

## 2024-01-18 ENCOUNTER — LAB VISIT (OUTPATIENT)
Dept: LAB | Facility: HOSPITAL | Age: 74
End: 2024-01-18
Attending: INTERNAL MEDICINE
Payer: MEDICARE

## 2024-01-18 ENCOUNTER — ANTI-COAG VISIT (OUTPATIENT)
Dept: CARDIOLOGY | Facility: CLINIC | Age: 74
End: 2024-01-18
Payer: MEDICARE

## 2024-01-18 DIAGNOSIS — D68.61 ANTI-PHOSPHOLIPID ANTIBODY SYNDROME: Primary | Chronic | ICD-10-CM

## 2024-01-18 DIAGNOSIS — Z79.01 LONG TERM CURRENT USE OF ANTICOAGULANT THERAPY: ICD-10-CM

## 2024-01-18 DIAGNOSIS — D68.61 ANTI-PHOSPHOLIPID ANTIBODY SYNDROME: Chronic | ICD-10-CM

## 2024-01-18 DIAGNOSIS — M32.19 OTHER SYSTEMIC LUPUS ERYTHEMATOSUS WITH OTHER ORGAN INVOLVEMENT: ICD-10-CM

## 2024-01-18 LAB
INR PPP: 2.7 (ref 0.8–1.2)
PROTHROMBIN TIME: 27.2 SEC (ref 9–12.5)

## 2024-01-18 PROCEDURE — 93793 ANTICOAG MGMT PT WARFARIN: CPT | Mod: S$GLB,,,

## 2024-01-18 PROCEDURE — 36415 COLL VENOUS BLD VENIPUNCTURE: CPT | Mod: HCNC,PO | Performed by: INTERNAL MEDICINE

## 2024-01-18 PROCEDURE — 85610 PROTHROMBIN TIME: CPT | Mod: HCNC | Performed by: INTERNAL MEDICINE

## 2024-01-18 NOTE — PROGRESS NOTES
[facility]    2024 4:20 PM    Assessment/Plan:    Patient presents today with therapeutic INR.    Assessment of patient findings and chart review: no significant findings     Recommendation for patient's warfarin regimen: Continue current maintenance dose    Recommend repeat INR in 3 weeks  _________________________________________________________________    Viraj Olsen (73 y.o.) is followed by the [facility].    Anticoagulation Summary  As of 2024      INR goal:  2.5-3.5   TTR:  69.2 % (11.5 y)   INR used for dosin.7 (2024)   Warfarin maintenance plan:  10 mg (5 mg x 2) every Mon, Wed, Fri; 5 mg (5 mg x 1) all other days   Weekly warfarin total:  50 mg   Plan last modified:  Erna Cha, PharmD (12/15/2023)   Next INR check:  2024   Target end date:      Indications    Other forms of systemic lupus erythematosus [M32.8]  Impending cerebrovascular accident (Resolved) [I63.9]  Anti-phospholipid antibody syndrome [D68.61]  Long term current use of anticoagulant therapy [Z79.01]                 Anticoagulation Episode Summary       INR check location:  Clinic Lab    Preferred lab:  [facility]    Send INR reminders to:  [facility] COUMADIN MONITORING POOL    Comments:  [facility] only Mon - Thu (INRs ) // Antiphospholipid - must go to lab          Anticoagulation Care Providers       Provider Role Specialty Phone number    Preet Quintero MD Responsible Rheumatology [phone]

## 2024-02-08 ENCOUNTER — LAB VISIT (OUTPATIENT)
Dept: LAB | Facility: HOSPITAL | Age: 74
End: 2024-02-08
Attending: INTERNAL MEDICINE
Payer: MEDICARE

## 2024-02-08 ENCOUNTER — ANTI-COAG VISIT (OUTPATIENT)
Dept: CARDIOLOGY | Facility: CLINIC | Age: 74
End: 2024-02-08
Payer: MEDICARE

## 2024-02-08 DIAGNOSIS — Z79.01 LONG TERM CURRENT USE OF ANTICOAGULANT THERAPY: ICD-10-CM

## 2024-02-08 DIAGNOSIS — M32.19 OTHER SYSTEMIC LUPUS ERYTHEMATOSUS WITH OTHER ORGAN INVOLVEMENT: ICD-10-CM

## 2024-02-08 DIAGNOSIS — D68.61 ANTI-PHOSPHOLIPID ANTIBODY SYNDROME: Chronic | ICD-10-CM

## 2024-02-08 DIAGNOSIS — M32.19 OTHER SYSTEMIC LUPUS ERYTHEMATOSUS WITH OTHER ORGAN INVOLVEMENT: Primary | ICD-10-CM

## 2024-02-08 LAB
INR PPP: 1.7 (ref 0.8–1.2)
PROTHROMBIN TIME: 17.9 SEC (ref 9–12.5)

## 2024-02-08 PROCEDURE — 93793 ANTICOAG MGMT PT WARFARIN: CPT | Mod: S$GLB,,,

## 2024-02-08 PROCEDURE — 36415 COLL VENOUS BLD VENIPUNCTURE: CPT | Mod: HCNC,PO | Performed by: INTERNAL MEDICINE

## 2024-02-08 PROCEDURE — 85610 PROTHROMBIN TIME: CPT | Mod: HCNC | Performed by: INTERNAL MEDICINE

## 2024-02-08 NOTE — PROGRESS NOTES
[facility]    02/08/2024 4:29 PM    Assessment/Plan:    Patient presents today with subtherapeutic  INR.    Assessment of patient findings and chart review: Salad intake    Recommendation for patient's warfarin regimen: Boost dose today to 10mg then resume current maintenance dose    Recommend repeat INR in 2 weeks  _________________________________________________________________    Viraj Pretty (73 y.o.) is followed by the [facility].    Anticoagulation Summary  As of 2/8/2024      INR goal:  2.5-3.5   TTR:  68.9 % (11.5 y)   INR used for dosing:     Warfarin maintenance plan:  10 mg (5 mg x 2) every Mon, Wed, Fri; 5 mg (5 mg x 1) all other days   Weekly warfarin total:  50 mg   Plan last modified:  Erna Cha, PharmD (12/15/2023)   Next INR check:  2/22/2024   Target end date:      Indications    Other forms of systemic lupus erythematosus [M32.8]  Impending cerebrovascular accident (Resolved) [I63.9]  Anti-phospholipid antibody syndrome [D68.61]  Long term current use of anticoagulant therapy [Z79.01]                 Anticoagulation Episode Summary       INR check location:  Clinic Lab    Preferred lab:  [facility]    Send INR reminders to:  [facility] COUMADIN MONITORING POOL    Comments:  [facility] only Mon - Thu (INRs Thursdays) // Antiphospholipid - must go to lab          Anticoagulation Care Providers       Provider Role Specialty Phone number    Preet Quintero MD Responsible Rheumatology [phone]            Patient Findings       Positives:  Change in diet/appetite    Negatives:  Signs/symptoms of thrombosis, Signs/symptoms of bleeding, Laboratory test error suspected, Change in health, Change in alcohol use, Change in activity, Upcoming invasive procedure, Emergency department visit, Upcoming dental procedure, Missed doses, Extra doses, Change in medications, Hospital  admission, Bruising, Other complaints    Comments:  Confirmed taking correct dose  Reports eating salads w/ abdoul & spinach mixed  NO other changes reported

## 2024-02-18 DIAGNOSIS — M17.30 POST-TRAUMATIC OSTEOARTHRITIS OF KNEE, UNSPECIFIED LATERALITY: ICD-10-CM

## 2024-02-19 RX ORDER — CELECOXIB 200 MG/1
CAPSULE ORAL
Qty: 20 CAPSULE | Refills: 1 | Status: SHIPPED | OUTPATIENT
Start: 2024-02-19 | End: 2024-04-18

## 2024-02-22 ENCOUNTER — ANTI-COAG VISIT (OUTPATIENT)
Dept: CARDIOLOGY | Facility: CLINIC | Age: 74
End: 2024-02-22
Payer: MEDICARE

## 2024-02-22 ENCOUNTER — LAB VISIT (OUTPATIENT)
Dept: LAB | Facility: HOSPITAL | Age: 74
End: 2024-02-22
Attending: INTERNAL MEDICINE
Payer: MEDICARE

## 2024-02-22 DIAGNOSIS — D68.61 ANTI-PHOSPHOLIPID ANTIBODY SYNDROME: Chronic | ICD-10-CM

## 2024-02-22 DIAGNOSIS — Z79.01 LONG TERM CURRENT USE OF ANTICOAGULANT THERAPY: Primary | ICD-10-CM

## 2024-02-22 DIAGNOSIS — Z79.01 LONG TERM CURRENT USE OF ANTICOAGULANT THERAPY: ICD-10-CM

## 2024-02-22 DIAGNOSIS — M32.19 OTHER SYSTEMIC LUPUS ERYTHEMATOSUS WITH OTHER ORGAN INVOLVEMENT: ICD-10-CM

## 2024-02-22 LAB
INR PPP: 2.9 (ref 0.8–1.2)
PROTHROMBIN TIME: 29.6 SEC (ref 9–12.5)

## 2024-02-22 PROCEDURE — 36415 COLL VENOUS BLD VENIPUNCTURE: CPT | Mod: HCNC,PO | Performed by: INTERNAL MEDICINE

## 2024-02-22 PROCEDURE — 93793 ANTICOAG MGMT PT WARFARIN: CPT | Mod: S$GLB,,,

## 2024-02-22 PROCEDURE — 85610 PROTHROMBIN TIME: CPT | Mod: HCNC | Performed by: INTERNAL MEDICINE

## 2024-02-22 NOTE — PROGRESS NOTES
[facility]    2024 4:24 PM    Assessment/Plan:    Patient presents today with therapeutic INR.    Assessment of patient findings and chart review: no significant findings     Recommendation for patient's warfarin regimen: Continue current maintenance dose    Recommend repeat INR in 2 weeks  _________________________________________________________________    Viraj Olsen (73 y.o.) is followed by the [facility].    Anticoagulation Summary  As of 2024      INR goal:  2.5-3.5   TTR:  68.8 % (11.6 y)   INR used for dosin.9 (2024)   Warfarin maintenance plan:  10 mg (5 mg x 2) every Mon, Wed, Fri; 5 mg (5 mg x 1) all other days   Weekly warfarin total:  50 mg   Plan last modified:  Erna Cha, PharmD (12/15/2023)   Next INR check:  3/4/2024   Target end date:      Indications    Other forms of systemic lupus erythematosus [M32.8]  Impending cerebrovascular accident (Resolved) [I63.9]  Anti-phospholipid antibody syndrome [D68.61]  Long term current use of anticoagulant therapy [Z79.01]                 Anticoagulation Episode Summary       INR check location:  Clinic Lab    Preferred lab:  [facility]    Send INR reminders to:  [facility] COUMADIN MONITORING POOL    Comments:  [facility] only Mon - Thu (INRs ) // Antiphospholipid - must go to lab          Anticoagulation Care Providers       Provider Role Specialty Phone number    Preet Quintero MD Responsible Rheumatology [phone]

## 2024-03-04 ENCOUNTER — LAB VISIT (OUTPATIENT)
Dept: LAB | Facility: HOSPITAL | Age: 74
End: 2024-03-04
Attending: INTERNAL MEDICINE
Payer: MEDICARE

## 2024-03-04 ENCOUNTER — OFFICE VISIT (OUTPATIENT)
Dept: RHEUMATOLOGY | Facility: CLINIC | Age: 74
End: 2024-03-04
Payer: MEDICARE

## 2024-03-04 VITALS
BODY MASS INDEX: 25.62 KG/M2 | WEIGHT: 178.56 LBS | HEART RATE: 79 BPM | SYSTOLIC BLOOD PRESSURE: 113 MMHG | DIASTOLIC BLOOD PRESSURE: 70 MMHG

## 2024-03-04 DIAGNOSIS — E66.3 OVERWEIGHT (BMI 25.0-29.9): ICD-10-CM

## 2024-03-04 DIAGNOSIS — Z79.899 HIGH RISK MEDICATION USE: ICD-10-CM

## 2024-03-04 DIAGNOSIS — M19.011 ARTHRITIS OF RIGHT STERNOCLAVICULAR JOINT: ICD-10-CM

## 2024-03-04 DIAGNOSIS — D68.61 ANTI-PHOSPHOLIPID ANTIBODY SYNDROME: Chronic | ICD-10-CM

## 2024-03-04 DIAGNOSIS — D84.821 DRUG-INDUCED IMMUNODEFICIENCY: ICD-10-CM

## 2024-03-04 DIAGNOSIS — M32.19 OTHER SYSTEMIC LUPUS ERYTHEMATOSUS WITH OTHER ORGAN INVOLVEMENT: Primary | ICD-10-CM

## 2024-03-04 DIAGNOSIS — Z79.899 DRUG-INDUCED IMMUNODEFICIENCY: ICD-10-CM

## 2024-03-04 DIAGNOSIS — M32.19 OTHER SYSTEMIC LUPUS ERYTHEMATOSUS WITH OTHER ORGAN INVOLVEMENT: ICD-10-CM

## 2024-03-04 DIAGNOSIS — M85.80 OSTEOPENIA, UNSPECIFIED LOCATION: ICD-10-CM

## 2024-03-04 DIAGNOSIS — Z79.01 LONG TERM CURRENT USE OF ANTICOAGULANT THERAPY: ICD-10-CM

## 2024-03-04 DIAGNOSIS — E24.2 DRUG-INDUCED CUSHING'S SYNDROME: ICD-10-CM

## 2024-03-04 DIAGNOSIS — M17.2 POST-TRAUMATIC OSTEOARTHRITIS OF BOTH KNEES: ICD-10-CM

## 2024-03-04 LAB
INR PPP: 2.8 (ref 0.8–1.2)
PROTHROMBIN TIME: 28.3 SEC (ref 9–12.5)

## 2024-03-04 PROCEDURE — 36415 COLL VENOUS BLD VENIPUNCTURE: CPT | Mod: HCNC,PO | Performed by: INTERNAL MEDICINE

## 2024-03-04 PROCEDURE — 3078F DIAST BP <80 MM HG: CPT | Mod: HCNC,CPTII,S$GLB, | Performed by: INTERNAL MEDICINE

## 2024-03-04 PROCEDURE — 1126F AMNT PAIN NOTED NONE PRSNT: CPT | Mod: HCNC,CPTII,S$GLB, | Performed by: INTERNAL MEDICINE

## 2024-03-04 PROCEDURE — 99214 OFFICE O/P EST MOD 30 MIN: CPT | Mod: HCNC,S$GLB,, | Performed by: INTERNAL MEDICINE

## 2024-03-04 PROCEDURE — 99999 PR PBB SHADOW E&M-EST. PATIENT-LVL IV: CPT | Mod: PBBFAC,HCNC,, | Performed by: INTERNAL MEDICINE

## 2024-03-04 PROCEDURE — 3288F FALL RISK ASSESSMENT DOCD: CPT | Mod: HCNC,CPTII,S$GLB, | Performed by: INTERNAL MEDICINE

## 2024-03-04 PROCEDURE — 85610 PROTHROMBIN TIME: CPT | Mod: HCNC | Performed by: INTERNAL MEDICINE

## 2024-03-04 PROCEDURE — 1159F MED LIST DOCD IN RCRD: CPT | Mod: HCNC,CPTII,S$GLB, | Performed by: INTERNAL MEDICINE

## 2024-03-04 PROCEDURE — 3074F SYST BP LT 130 MM HG: CPT | Mod: HCNC,CPTII,S$GLB, | Performed by: INTERNAL MEDICINE

## 2024-03-04 PROCEDURE — 1101F PT FALLS ASSESS-DOCD LE1/YR: CPT | Mod: HCNC,CPTII,S$GLB, | Performed by: INTERNAL MEDICINE

## 2024-03-04 PROCEDURE — 3008F BODY MASS INDEX DOCD: CPT | Mod: HCNC,CPTII,S$GLB, | Performed by: INTERNAL MEDICINE

## 2024-03-04 ASSESSMENT — ROUTINE ASSESSMENT OF PATIENT INDEX DATA (RAPID3)
PATIENT GLOBAL ASSESSMENT SCORE: 0
PSYCHOLOGICAL DISTRESS SCORE: 0
TOTAL RAPID3 SCORE: 0.22
MDHAQ FUNCTION SCORE: 0.2
PAIN SCORE: 0
FATIGUE SCORE: 0

## 2024-03-04 NOTE — PROGRESS NOTES
Subjective:       Patient ID: Viraj Olsen is a 73 y.o. male.    Chief Complaint: Disease Management    HPI    Retired last year  Eats healthier  Exercises on rowing machine and treadmill  Lost about 20 lb    He has a small piece of paper  Needs bone density; last done 2018  Wife notes collarbone deformity  Field of vision test was ok and eye doctor said he does not need testing any more    Balance issue; if turns suddenly; if gets up at night;; no vertigo ; has some hearing loss; no fall last year but can trip easily    Uses treadmill most days but has to hold onto railings for faster speed    Lower back ache forever  L thumb pain at [facility] sometimes very painful  RLS relieved by     Skiin cancer; had melanoma L ear and Basal cell L post neck last  year   Remodeled house last year; did a lot of work himself  Bowls about 3/week  Upcoming trip to [place] and [employer]    Last labs Oct nl CMP; CBC low Hgb  Sept urine p/c negative  Last inflammatory markers were Dec 2022    Review of Systems   Constitutional:  Negative for fatigue, fever and unexpected weight change.        Lost weight with change in lifestyle   HENT:  Negative for mouth sores.    Respiratory:  Negative for cough and shortness of breath.    Cardiovascular:  Negative for chest pain.   Gastrointestinal:  Positive for constipation. Negative for diarrhea.   Genitourinary:  Negative for dysuria.   Skin:  Negative for rash.   Neurological:  Negative for headaches.   Psychiatric/Behavioral:  Negative for sleep disturbance.          Objective:   /70   Pulse 79   Wt 81 kg (178 lb 9.2 oz)   BMI 25.62 kg/m²      Physical Exam   Constitutional: normal appearance. No distress. He does not appear ill.   HENT:   Nose: Nose normal.   Mouth/Throat: No ulcers  Eyes: No scleral icterus.   Cardiovascular: Normal rate and regular rhythm.   Pulmonary/Chest: He has no wheezes. He has no rales.   Abdominal: There is no abdominal tenderness.   Musculoskeletal:       Comments: R SC joint protrudes but not tender or unstable   Lymphadenopathy:     He has no cervical adenopathy.   Skin: No rash noted.         Assessment:       1. Other systemic lupus erythematosus with other organ involvement    2. High risk medication use    3. Anti-phospholipid antibody syndrome    4. Arthritis of right sternoclavicular joint    5. Osteopenia, unspecified location    6. Drug-induced Cushing's syndrome    7. Post-traumatic osteoarthritis of both knees    8. Overweight (BMI 25.0-29.9)    9. Drug-induced immunodeficiency            Plan:       Problem List Items Addressed This Visit          Active Problems    Other forms of systemic lupus erythematosus - Primary     Lupus still in clinical remission but needs labs to assess inflammatory markers; will schedule these with next protime/INR lab    If lab ok then plan 6 mo follow up    Discussed other issues; suspect that dizziness is normal aging and weight loss is appropriate for improved lifestyle and diet         Relevant Orders    Ambulatory referral/consult to Optometry    Sedimentation rate    CBC Auto Differential    Comprehensive Metabolic Panel    Urinalysis    C-Reactive Protein    Anti-DNA Ab, Double-Stranded    C3 Complement    C4 Complement    Anti-phospholipid antibody syndrome (Chronic)     He has had no complications while on lifelong warfarin and will continue this for now         Post-traumatic osteoarthritis of knee     This is better possibly after weight loss and exercise         High risk medication use     His eye doctor told him no further eye examination is needed, which is in clear conflict with American Academy of Ophthalmology Guidelines ( Recommendations on Screening for Chloroquine and Hydroxychloroquine Retinopathy - 2016 - American Academy of Ophthalmology (aao.org)   I will refer him to our eye team for proper follow up screening since he should continue hydroxychloroquine for lupus if no retinal complications           Relevant Orders    Ambulatory referral/consult to Optometry       Resolved Problems    RESOLVED: Overweight (BMI 25.0-29.9)     This is resolved and current BMI does not reflect his very lean status         RESOLVED: Drug-induced immunodeficiency     Fortunately he has not required immunosuppressive Rx and is only on hydroxychloroquine           Other Visit Diagnoses       Arthritis of right sternoclavicular joint        Relevant Orders    X-Ray Chest PA And Lateral    X-Ray Sternoclavicular Joints Min 3 View    Osteopenia, unspecified location        Relevant Orders    DXA Bone Density Axial Skeleton 1 or more sites    Drug-induced Cushing's syndrome        Relevant Orders    DXA Bone Density Axial Skeleton 1 or more sites

## 2024-03-04 NOTE — ASSESSMENT & PLAN NOTE
Primary Care Behavioral Health Integration: Follow-up  Date:  8/4/2023  Patient Name: Rema Horton  Type of Visit:  Video Session  Site:  [facility]  Referral Source:  Britta Clark MD    Visit type: Virtual visit with synchronous audio and video  The patient location is:  Home  The [place] is: [facility]  The patient phone number is: [phone]    Each patient to whom he or she provides medical services by telemedicine is:  (1) informed of the relationship between the physician and patient and the respective role of any other health care provider with respect to management of the patient; and (2) notified that he or she may decline to receive medical services by telemedicine and may withdraw from such care at any time.    History of Present Illness:  Rema Horton, a 50 y.o.  female with history of Anxiety disorders; generalized anxiety disorder [F41.1] and Major Depressive Disorder, Recurrent, Moderate (F33.1) referred by Britta Clark MD.  Patient was seen, examined and chart was reviewed.    Met with patient.     Results of the PHQ8 8/1/2023 7/17/2023 7/16/2023 7/3/2023 6/16/2023 5/16/2023 3/21/2023   1. Little interest or pleasure in doing things Nearly every day Nearly every day Nearly every day Nearly every day Nearly every day Nearly every day Nearly every day   2. Feeling down, depressed, or hopeless Nearly every day Nearly every day Nearly every day Nearly every day Nearly every day Several days Several days   3. Trouble falling or staying asleep, or sleeping too much Nearly every day Nearly every day Nearly every day Nearly every day Nearly every day Nearly every day Nearly every day   4. Feeling tired or having little energy Nearly every day Nearly every day Nearly every day Nearly every day Nearly every day Nearly every day Nearly every day   5. poor appetite or overeating Nearly every day More than half the days More than half the days Nearly every day Several  This is resolved and current BMI does not reflect his very lean status   days More than half the days More than half the days   6. Feeling bad about yourself - or that you are a failure or have let yourself or your family down Nearly every day Nearly every day Nearly every day Several days Several days Nearly every day Nearly every day   7. Trouble concentrating on things, such as reading the newspaper or watching television Nearly every day Nearly every day Nearly every day Nearly every day Nearly every day Nearly every day Nearly every day   8. Moving or speaking so slowly that other people could have noticed? Or the opposite - being so fidgety or restless that you have been moving around a lot more than usual Nearly every day More than half the days Nearly every day Nearly every day Nearly every day Nearly every day Nearly every day   Total Score  24 22 23 22 20 21 21     Depression Patient Health Questionnaire 5/4/2023 3/3/2023 5/20/2022 3/3/2022 6/4/2021 6/4/2021 1/10/2020   Over the last two weeks how often have you been bothered by little interest or pleasure in doing things Not at all Nearly every day Not at all Not at all Not at all Not at all Nearly every day   Over the last two weeks how often have you been bothered by feeling down, depressed or hopeless Not at all Nearly every day Not at all Several days Not at all Not at all Nearly every day   PHQ-2 Total Score 0 6 0 1 0 0 6   PHQ-9 Total Score - 17 - - - - 18   PHQ-9 Interpretation - Moderately Severe - - - - -      GAD7 8/1/2023 7/17/2023 7/16/2023   1. Feeling nervous, anxious, or on edge? 3 3 3   2. Not being able to stop or control worrying? 3 3 3   3. Worrying too much about different things? 3 3 3   4. Trouble relaxing? 3 3 3   5. Being so restless that it is hard to sit still? 3 2 3   6. Becoming easily annoyed or irritable? 2 3 3   7. Feeling afraid as if something awful might happen? 3 3 3   8. If you checked off any problems, how difficult have these problems made it for you to do your work, take care of things at  home, or get along with other people? - - -   MARIMAR-7 Score 20 20 21       Treatment plan:  Target symptoms: depression, anxiety   Why chosen therapy is appropriate versus another modality: relevant to diagnosis  Outcome monitoring methods: self-report  Therapeutic intervention type: supportive psychotherapy    Risk parameters:  Patient reports no suicidal ideation  Patient reports no homicidal ideation  Patient reports no self-injurious behavior  Patient reports no violent behavior    Verbal deficits: None    Patient's response to intervention:  The patient's response to intervention is accepting.    Progress toward goals and other mental status changes:  The patient's progress toward goals is not progressing.    Patient advised to call [phone] or present the the nearest ED if they experience suicidal or homicidal ideation, plan or intent.       Impression:  My diagnostic impression: Patient reported still in physical pain although all tests she's taken have been normal. Looking forward to school starting on Monday as they will get back on a schedule. Stays busy taking her daughter and herself to many doctors. Has an upcoming appt with psychiatry.    Diagnosis:   Anxiety disorders; generalized anxiety disorder [F41.1] and Major Depressive Disorder, Recurrent, Moderate (F33.1)    Treatment Goals and Plan: Pt plans to continue CBT and Solution-focused Therapy    Future treatment will utilize CBT and Solution-focused Therapy    Return to Clinic: 2 weeks    Length of Appointment: 20 Non face-to-face clinical time: 15

## 2024-03-04 NOTE — ASSESSMENT & PLAN NOTE
Lupus still in clinical remission but needs labs to assess inflammatory markers; will schedule these with next protime/INR lab    If lab ok then plan 6 mo follow up    Discussed other issues; suspect that dizziness is normal aging and weight loss is appropriate for improved lifestyle and diet

## 2024-03-04 NOTE — ASSESSMENT & PLAN NOTE
His eye doctor told him no further eye examination is needed, which is in clear conflict with American Academy of Ophthalmology Guidelines ( Recommendations on Screening for Chloroquine and Hydroxychloroquine Retinopathy - 2016 - American Academy of Ophthalmology (aao.org)   I will refer him to our eye team for proper follow up screening since he should continue hydroxychloroquine for lupus if no retinal complications

## 2024-03-05 ENCOUNTER — ANTI-COAG VISIT (OUTPATIENT)
Dept: CARDIOLOGY | Facility: CLINIC | Age: 74
End: 2024-03-05
Payer: MEDICARE

## 2024-03-05 DIAGNOSIS — D68.61 ANTI-PHOSPHOLIPID ANTIBODY SYNDROME: Chronic | ICD-10-CM

## 2024-03-05 DIAGNOSIS — M32.19 OTHER SYSTEMIC LUPUS ERYTHEMATOSUS WITH OTHER ORGAN INVOLVEMENT: Primary | ICD-10-CM

## 2024-03-05 DIAGNOSIS — Z79.01 LONG TERM CURRENT USE OF ANTICOAGULANT THERAPY: ICD-10-CM

## 2024-03-05 PROCEDURE — 93793 ANTICOAG MGMT PT WARFARIN: CPT | Mod: S$GLB,,,

## 2024-03-05 NOTE — PROGRESS NOTES
[facility]    2024 8:04 AM    Assessment/Plan:    Patient presents today with therapeutic INR.    Assessment of patient findings and chart review: no significant findings     Recommendation for patient's warfarin regimen: Continue current maintenance dose    Recommend repeat INR in 3 weeks  _________________________________________________________________    Viraj Olsen (73 y.o.) is followed by the [facility].    Anticoagulation Summary  As of 3/5/2024      INR goal:  2.5-3.5   TTR:  68.9 % (11.6 y)   INR used for dosin.8 (3/4/2024)   Warfarin maintenance plan:  10 mg (5 mg x 2) every Mon, Wed, Fri; 5 mg (5 mg x 1) all other days   Weekly warfarin total:  50 mg   Plan last modified:  Erna Cha, PharmD (12/15/2023)   Next INR check:  3/25/2024   Target end date:      Indications    Other forms of systemic lupus erythematosus [M32.8]  Impending cerebrovascular accident (Resolved) [I63.9]  Anti-phospholipid antibody syndrome [D68.61]  Long term current use of anticoagulant therapy [Z79.01]                 Anticoagulation Episode Summary       INR check location:  Clinic Lab    Preferred lab:  [facility]    Send INR reminders to:  [facility] COUMADIN MONITORING POOL    Comments:  [facility] only Mon - Thu (INRs ) // Antiphospholipid - must go to lab          Anticoagulation Care Providers       Provider Role Specialty Phone number    Preet Quintero MD Responsible Rheumatology [phone]

## 2024-03-06 ENCOUNTER — HOSPITAL ENCOUNTER (OUTPATIENT)
Dept: RADIOLOGY | Facility: HOSPITAL | Age: 74
Discharge: HOME | End: 2024-03-06
Attending: INTERNAL MEDICINE
Payer: MEDICARE

## 2024-03-06 ENCOUNTER — PATIENT MESSAGE (OUTPATIENT)
Dept: RHEUMATOLOGY | Facility: CLINIC | Age: 74
End: 2024-03-06
Payer: MEDICARE

## 2024-03-06 DIAGNOSIS — M19.011 ARTHRITIS OF RIGHT STERNOCLAVICULAR JOINT: ICD-10-CM

## 2024-03-06 DIAGNOSIS — M85.80 OSTEOPENIA, UNSPECIFIED LOCATION: ICD-10-CM

## 2024-03-06 DIAGNOSIS — G25.81 RESTLESS LEGS SYNDROME (RLS): Chronic | ICD-10-CM

## 2024-03-06 DIAGNOSIS — E24.2 DRUG-INDUCED CUSHING'S SYNDROME: ICD-10-CM

## 2024-03-06 PROCEDURE — 71130 X-RAY STRENOCLAVIC JT 3/>VWS: CPT | Mod: 26,HCNC,, | Performed by: RADIOLOGY

## 2024-03-06 PROCEDURE — 71046 X-RAY EXAM CHEST 2 VIEWS: CPT | Mod: 26,HCNC,, | Performed by: RADIOLOGY

## 2024-03-06 PROCEDURE — 77080 DXA BONE DENSITY AXIAL: CPT | Mod: 26,HCNC,, | Performed by: RADIOLOGY

## 2024-03-06 PROCEDURE — 71046 X-RAY EXAM CHEST 2 VIEWS: CPT | Mod: TC,HCNC,FY

## 2024-03-06 PROCEDURE — 71130 X-RAY STRENOCLAVIC JT 3/>VWS: CPT | Mod: TC,HCNC,FY

## 2024-03-06 PROCEDURE — 77080 DXA BONE DENSITY AXIAL: CPT | Mod: TC,HCNC

## 2024-03-08 RX ORDER — ROPINIROLE 0.5 MG/1
0.5 TABLET, FILM COATED ORAL NIGHTLY PRN
Qty: 30 TABLET | Refills: 2 | Status: SHIPPED | OUTPATIENT
Start: 2024-03-08 | End: 2024-06-06

## 2024-03-25 ENCOUNTER — ANTI-COAG VISIT (OUTPATIENT)
Dept: CARDIOLOGY | Facility: CLINIC | Age: 74
End: 2024-03-25
Payer: MEDICARE

## 2024-03-25 ENCOUNTER — LAB VISIT (OUTPATIENT)
Dept: LAB | Facility: HOSPITAL | Age: 74
End: 2024-03-25
Attending: INTERNAL MEDICINE
Payer: MEDICARE

## 2024-03-25 DIAGNOSIS — D68.61 ANTI-PHOSPHOLIPID ANTIBODY SYNDROME: Chronic | ICD-10-CM

## 2024-03-25 DIAGNOSIS — M32.19 OTHER SYSTEMIC LUPUS ERYTHEMATOSUS WITH OTHER ORGAN INVOLVEMENT: Primary | ICD-10-CM

## 2024-03-25 DIAGNOSIS — Z79.01 LONG TERM CURRENT USE OF ANTICOAGULANT THERAPY: ICD-10-CM

## 2024-03-25 DIAGNOSIS — M32.19 OTHER SYSTEMIC LUPUS ERYTHEMATOSUS WITH OTHER ORGAN INVOLVEMENT: ICD-10-CM

## 2024-03-25 LAB
ALBUMIN SERPL BCP-MCNC: 4 G/DL (ref 3.5–5.2)
ALP SERPL-CCNC: 73 U/L (ref 55–135)
ALT SERPL W/O P-5'-P-CCNC: 18 U/L (ref 10–44)
ANION GAP SERPL CALC-SCNC: 6 MMOL/L (ref 8–16)
AST SERPL-CCNC: 25 U/L (ref 10–40)
BASOPHILS # BLD AUTO: 0.03 K/UL (ref 0–0.2)
BASOPHILS NFR BLD: 0.7 % (ref 0–1.9)
BILIRUB SERPL-MCNC: 0.5 MG/DL (ref 0.1–1)
BUN SERPL-MCNC: 21 MG/DL (ref 8–23)
C3 SERPL-MCNC: 76 MG/DL (ref 50–180)
C4 SERPL-MCNC: 12 MG/DL (ref 11–44)
CALCIUM SERPL-MCNC: 10.2 MG/DL (ref 8.7–10.5)
CHLORIDE SERPL-SCNC: 103 MMOL/L (ref 95–110)
CO2 SERPL-SCNC: 28 MMOL/L (ref 23–29)
CREAT SERPL-MCNC: 0.9 MG/DL (ref 0.5–1.4)
CRP SERPL-MCNC: 1.6 MG/L (ref 0–8.2)
DIFFERENTIAL METHOD BLD: ABNORMAL
EOSINOPHIL # BLD AUTO: 0.1 K/UL (ref 0–0.5)
EOSINOPHIL NFR BLD: 2.4 % (ref 0–8)
ERYTHROCYTE [DISTWIDTH] IN BLOOD BY AUTOMATED COUNT: 12.5 % (ref 11.5–14.5)
ERYTHROCYTE [SEDIMENTATION RATE] IN BLOOD BY PHOTOMETRIC METHOD: 31 MM/HR (ref 0–23)
EST. GFR  (NO RACE VARIABLE): >60 ML/MIN/1.73 M^2
GLUCOSE SERPL-MCNC: 81 MG/DL (ref 70–110)
HCT VFR BLD AUTO: 36.6 % (ref 40–54)
HGB BLD-MCNC: 12.2 G/DL (ref 14–18)
IMM GRANULOCYTES # BLD AUTO: 0 K/UL (ref 0–0.04)
IMM GRANULOCYTES NFR BLD AUTO: 0 % (ref 0–0.5)
INR PPP: 2.5 (ref 0.8–1.2)
LYMPHOCYTES # BLD AUTO: 1.1 K/UL (ref 1–4.8)
LYMPHOCYTES NFR BLD: 24.8 % (ref 18–48)
MCH RBC QN AUTO: 31 PG (ref 27–31)
MCHC RBC AUTO-ENTMCNC: 33.3 G/DL (ref 32–36)
MCV RBC AUTO: 93 FL (ref 82–98)
MONOCYTES # BLD AUTO: 0.5 K/UL (ref 0.3–1)
MONOCYTES NFR BLD: 10.7 % (ref 4–15)
NEUTROPHILS # BLD AUTO: 2.8 K/UL (ref 1.8–7.7)
NEUTROPHILS NFR BLD: 61.4 % (ref 38–73)
NRBC BLD-RTO: 0 /100 WBC
PLATELET # BLD AUTO: 234 K/UL (ref 150–450)
PMV BLD AUTO: 9.8 FL (ref 9.2–12.9)
POTASSIUM SERPL-SCNC: 4.7 MMOL/L (ref 3.5–5.1)
PROT SERPL-MCNC: 6.5 G/DL (ref 6–8.4)
PROTHROMBIN TIME: 25.8 SEC (ref 9–12.5)
RBC # BLD AUTO: 3.93 M/UL (ref 4.6–6.2)
SODIUM SERPL-SCNC: 137 MMOL/L (ref 136–145)
WBC # BLD AUTO: 4.6 K/UL (ref 3.9–12.7)

## 2024-03-25 PROCEDURE — 93793 ANTICOAG MGMT PT WARFARIN: CPT | Mod: S$GLB,,,

## 2024-03-25 PROCEDURE — 86140 C-REACTIVE PROTEIN: CPT | Mod: HCNC | Performed by: INTERNAL MEDICINE

## 2024-03-25 PROCEDURE — 80053 COMPREHEN METABOLIC PANEL: CPT | Mod: HCNC | Performed by: INTERNAL MEDICINE

## 2024-03-25 PROCEDURE — 85652 RBC SED RATE AUTOMATED: CPT | Mod: HCNC | Performed by: INTERNAL MEDICINE

## 2024-03-25 PROCEDURE — 86225 DNA ANTIBODY NATIVE: CPT | Mod: HCNC | Performed by: INTERNAL MEDICINE

## 2024-03-25 PROCEDURE — 85610 PROTHROMBIN TIME: CPT | Mod: HCNC | Performed by: INTERNAL MEDICINE

## 2024-03-25 PROCEDURE — 86160 COMPLEMENT ANTIGEN: CPT | Mod: 59,HCNC | Performed by: INTERNAL MEDICINE

## 2024-03-25 PROCEDURE — 36415 COLL VENOUS BLD VENIPUNCTURE: CPT | Mod: HCNC,PO | Performed by: INTERNAL MEDICINE

## 2024-03-25 PROCEDURE — 86160 COMPLEMENT ANTIGEN: CPT | Mod: HCNC | Performed by: INTERNAL MEDICINE

## 2024-03-25 PROCEDURE — 85025 COMPLETE CBC W/AUTO DIFF WBC: CPT | Mod: HCNC | Performed by: INTERNAL MEDICINE

## 2024-03-25 NOTE — PROGRESS NOTES
[facility]    2024 4:09 PM    Assessment/Plan:    Patient presents today with therapeutic INR.    Assessment of patient findings and chart review: no significant changes noted    Recommendation for patient's warfarin regimen: Continue current maintenance dose    Recommend repeat INR in 4 weeks  _________________________________________________________________    Viraj Olsen (73 y.o.) is followed by the [facility].    Anticoagulation Summary  As of 3/25/2024      INR goal:  2.5-3.5   TTR:  69.0 % (11.7 y)   INR used for dosin.5 (3/25/2024)   Warfarin maintenance plan:  10 mg (5 mg x 2) every Mon, Wed, Fri; 5 mg (5 mg x 1) all other days   Weekly warfarin total:  50 mg   Plan last modified:  Erna Cha, PharmD (12/15/2023)   Next INR check:  2024   Target end date:      Indications    Other forms of systemic lupus erythematosus [M32.8]  Impending cerebrovascular accident (Resolved) [I63.9]  Anti-phospholipid antibody syndrome [D68.61]  Long term current use of anticoagulant therapy [Z79.01]                 Anticoagulation Episode Summary       INR check location:  Clinic Lab    Preferred lab:  [facility]    Send INR reminders to:  [facility] COUMADIN MONITORING POOL    Comments:  [facility] only Mon - Thu (INRs ) // Antiphospholipid - must go to lab          Anticoagulation Care Providers       Provider Role Specialty Phone number    Preet Quintero MD Responsible Rheumatology [phone]

## 2024-03-27 LAB — DSDNA AB SER-ACNC: NORMAL [IU]/ML

## 2024-04-02 ENCOUNTER — TELEPHONE (OUTPATIENT)
Dept: SPORTS MEDICINE | Facility: CLINIC | Age: 74
End: 2024-04-02
Payer: MEDICARE

## 2024-04-02 NOTE — TELEPHONE ENCOUNTER
----- Message from Yovana Smallwood MA sent at 4/1/2024  3:45 PM CDT -----    ----- Message -----  From: Adrienne Lopez  Sent: 4/1/2024  12:21 PM CDT  To: Yovana Smallwood MA    Please call patient and reschedule his appointment to be with ortho or spine depending on his symptoms.     Adrienne Lopez   Clinical assistant to Dr. Dipika Varner

## 2024-04-10 ENCOUNTER — TELEPHONE (OUTPATIENT)
Dept: DERMATOLOGY | Facility: CLINIC | Age: 74
End: 2024-04-10
Payer: MEDICARE

## 2024-04-15 DIAGNOSIS — M25.551 HIP PAIN, RIGHT: Primary | ICD-10-CM

## 2024-04-16 ENCOUNTER — OFFICE VISIT (OUTPATIENT)
Dept: ORTHOPEDICS | Facility: CLINIC | Age: 74
End: 2024-04-16
Payer: MEDICARE

## 2024-04-16 ENCOUNTER — HOSPITAL ENCOUNTER (OUTPATIENT)
Dept: RADIOLOGY | Facility: HOSPITAL | Age: 74
Discharge: HOME | End: 2024-04-16
Attending: ORTHOPAEDIC SURGERY
Payer: MEDICARE

## 2024-04-16 VITALS — HEIGHT: 70 IN | WEIGHT: 169.31 LBS | BODY MASS INDEX: 24.24 KG/M2

## 2024-04-16 DIAGNOSIS — S76.301A RIGHT HAMSTRING INJURY, INITIAL ENCOUNTER: ICD-10-CM

## 2024-04-16 DIAGNOSIS — M25.551 HIP PAIN, RIGHT: ICD-10-CM

## 2024-04-16 DIAGNOSIS — M25.561 RIGHT KNEE PAIN, UNSPECIFIED CHRONICITY: Primary | ICD-10-CM

## 2024-04-16 DIAGNOSIS — M25.561 RIGHT KNEE PAIN, UNSPECIFIED CHRONICITY: ICD-10-CM

## 2024-04-16 PROCEDURE — 99999 PR PBB SHADOW E&M-EST. PATIENT-LVL III: CPT | Mod: PBBFAC,,, | Performed by: ORTHOPAEDIC SURGERY

## 2024-04-16 PROCEDURE — 3288F FALL RISK ASSESSMENT DOCD: CPT | Mod: CPTII,S$GLB,, | Performed by: ORTHOPAEDIC SURGERY

## 2024-04-16 PROCEDURE — 73502 X-RAY EXAM HIP UNI 2-3 VIEWS: CPT | Mod: TC,RT

## 2024-04-16 PROCEDURE — 1101F PT FALLS ASSESS-DOCD LE1/YR: CPT | Mod: CPTII,S$GLB,, | Performed by: ORTHOPAEDIC SURGERY

## 2024-04-16 PROCEDURE — 73564 X-RAY EXAM KNEE 4 OR MORE: CPT | Mod: 26,RT,, | Performed by: STUDENT IN AN ORGANIZED HEALTH CARE EDUCATION/TRAINING PROGRAM

## 2024-04-16 PROCEDURE — 73562 X-RAY EXAM OF KNEE 3: CPT | Mod: 26,59,LT, | Performed by: STUDENT IN AN ORGANIZED HEALTH CARE EDUCATION/TRAINING PROGRAM

## 2024-04-16 PROCEDURE — 1125F AMNT PAIN NOTED PAIN PRSNT: CPT | Mod: CPTII,S$GLB,, | Performed by: ORTHOPAEDIC SURGERY

## 2024-04-16 PROCEDURE — 99213 OFFICE O/P EST LOW 20 MIN: CPT | Mod: S$GLB,,, | Performed by: ORTHOPAEDIC SURGERY

## 2024-04-16 PROCEDURE — 1159F MED LIST DOCD IN RCRD: CPT | Mod: CPTII,S$GLB,, | Performed by: ORTHOPAEDIC SURGERY

## 2024-04-16 PROCEDURE — 73502 X-RAY EXAM HIP UNI 2-3 VIEWS: CPT | Mod: 26,RT,, | Performed by: RADIOLOGY

## 2024-04-16 PROCEDURE — 73562 X-RAY EXAM OF KNEE 3: CPT | Mod: TC,LT

## 2024-04-16 PROCEDURE — 1160F RVW MEDS BY RX/DR IN RCRD: CPT | Mod: CPTII,S$GLB,, | Performed by: ORTHOPAEDIC SURGERY

## 2024-04-16 PROCEDURE — 3008F BODY MASS INDEX DOCD: CPT | Mod: CPTII,S$GLB,, | Performed by: ORTHOPAEDIC SURGERY

## 2024-04-17 ENCOUNTER — LAB VISIT (OUTPATIENT)
Dept: LAB | Facility: HOSPITAL | Age: 74
End: 2024-04-17
Attending: INTERNAL MEDICINE
Payer: MEDICARE

## 2024-04-17 ENCOUNTER — OFFICE VISIT (OUTPATIENT)
Dept: DERMATOLOGY | Facility: CLINIC | Age: 74
End: 2024-04-17
Payer: MEDICARE

## 2024-04-17 DIAGNOSIS — L81.4 LENTIGO: Primary | ICD-10-CM

## 2024-04-17 DIAGNOSIS — D22.9 MULTIPLE BENIGN NEVI: ICD-10-CM

## 2024-04-17 DIAGNOSIS — D68.61 ANTI-PHOSPHOLIPID ANTIBODY SYNDROME: Chronic | ICD-10-CM

## 2024-04-17 DIAGNOSIS — Z85.820 HISTORY OF MALIGNANT MELANOMA OF SKIN: ICD-10-CM

## 2024-04-17 DIAGNOSIS — Z85.828 HISTORY OF NONMELANOMA SKIN CANCER: ICD-10-CM

## 2024-04-17 DIAGNOSIS — D23.9 DERMATOFIBROMA: ICD-10-CM

## 2024-04-17 DIAGNOSIS — M17.30 POST-TRAUMATIC OSTEOARTHRITIS OF KNEE, UNSPECIFIED LATERALITY: ICD-10-CM

## 2024-04-17 DIAGNOSIS — Z79.01 LONG TERM CURRENT USE OF ANTICOAGULANT THERAPY: ICD-10-CM

## 2024-04-17 DIAGNOSIS — M32.19 OTHER SYSTEMIC LUPUS ERYTHEMATOSUS WITH OTHER ORGAN INVOLVEMENT: ICD-10-CM

## 2024-04-17 DIAGNOSIS — L82.1 SK (SEBORRHEIC KERATOSIS): ICD-10-CM

## 2024-04-17 LAB
INR PPP: 2.4 (ref 0.8–1.2)
PROTHROMBIN TIME: 24.7 SEC (ref 9–12.5)

## 2024-04-17 PROCEDURE — 3288F FALL RISK ASSESSMENT DOCD: CPT | Mod: CPTII,S$GLB,, | Performed by: DERMATOLOGY

## 2024-04-17 PROCEDURE — 1160F RVW MEDS BY RX/DR IN RCRD: CPT | Mod: CPTII,S$GLB,, | Performed by: DERMATOLOGY

## 2024-04-17 PROCEDURE — 85610 PROTHROMBIN TIME: CPT | Performed by: INTERNAL MEDICINE

## 2024-04-17 PROCEDURE — 99214 OFFICE O/P EST MOD 30 MIN: CPT | Mod: S$GLB,,, | Performed by: DERMATOLOGY

## 2024-04-17 PROCEDURE — 99999 PR PBB SHADOW E&M-EST. PATIENT-LVL III: CPT | Mod: PBBFAC,,, | Performed by: DERMATOLOGY

## 2024-04-17 PROCEDURE — 1101F PT FALLS ASSESS-DOCD LE1/YR: CPT | Mod: CPTII,S$GLB,, | Performed by: DERMATOLOGY

## 2024-04-17 PROCEDURE — 36415 COLL VENOUS BLD VENIPUNCTURE: CPT | Mod: PO | Performed by: INTERNAL MEDICINE

## 2024-04-17 PROCEDURE — 1125F AMNT PAIN NOTED PAIN PRSNT: CPT | Mod: CPTII,S$GLB,, | Performed by: DERMATOLOGY

## 2024-04-17 PROCEDURE — 1159F MED LIST DOCD IN RCRD: CPT | Mod: CPTII,S$GLB,, | Performed by: DERMATOLOGY

## 2024-04-17 NOTE — PROGRESS NOTES
Subjective:      Patient ID:  Viraj Olsen is a 73 y.o. male who presents for   Chief Complaint   Patient presents with    Skin Check     [facility]      Date of biopsy: 09/11/23   Allamuchy test  none  Location: L ear helix   Depth: 0.6 mm  LN: no  Date of excision: 10/24/2023    Pt has a history of  moderate sun exposure in the past.   Pt recalls several blistering sunburns in the past:  many  Pt has history of tanning bed use: never   Pt has had atypical moles removed in the past: yes  Pt has personal history of breast cancer: no  Pt has history of melanoma in first degree relatives:  no  Pt has family history of pancreatic cancer: no   Pt is currently immunosuppressed: yes     Duque Type: I     Last dental exam: June 2023   Last eye exam: January 2024   Last GYN exam: none     Pt denies any new or changing lesions today                       Review of Systems   Constitutional:  Negative for fever, chills, weight loss, fatigue, night sweats and malaise.   HENT:  Negative for headaches.    Respiratory:  Negative for cough and shortness of breath.    Gastrointestinal:  Negative for indigestion.   Skin:  Positive for activity-related sunscreen use. Negative for daily sunscreen use, recent sunburn and wears hat.   Neurological:  Negative for headaches.   Hematologic/Lymphatic: Negative for adenopathy. Does not bruise/bleed easily.       Objective:   Physical Exam   Constitutional: He appears well-developed and well-nourished. No distress.   Neurological: He is alert and oriented to person, place, and time. He is not disoriented.   Psychiatric: He has a normal mood and affect.   Skin:   Areas Examined (abnormalities noted in diagram):   Scalp / Hair Palpated and Inspected  Head / Face Inspection Performed  Neck Inspection Performed  Chest / Axilla Inspection Performed  Abdomen Inspection Performed  Genitals / Buttocks / Groin Inspection Performed  Back Inspection Performed  RUE Inspected  LUE Inspection  Performed  RLE Inspected  LLE Inspection Performed  Nails and Digits Inspection Performed                 Diagram Legend     Erythematous scaling macule/papule c/w actinic keratosis       Vascular papule c/w angioma      Pigmented verrucoid papule/plaque c/w seborrheic keratosis      Yellow umbilicated papule c/w sebaceous hyperplasia      Irregularly shaped tan macule c/w lentigo     1-2 mm smooth white papules consistent with Milia      Movable subcutaneous cyst with punctum c/w epidermal inclusion cyst      Subcutaneous movable cyst c/w pilar cyst      Firm pink to brown papule c/w dermatofibroma      Pedunculated fleshy papule(s) c/w skin tag(s)      Evenly pigmented macule c/w junctional nevus     Mildly variegated pigmented, slightly irregular-bordered macule c/w mildly atypical nevus      Flesh colored to evenly pigmented papule c/w intradermal nevus       Pink pearly papule/plaque c/w basal cell carcinoma      Erythematous hyperkeratotic cursted plaque c/w SCC      Surgical scar with no sign of skin cancer recurrence      Open and closed comedones      Inflammatory papules and pustules      Verrucoid papule consistent consistent with wart     Erythematous eczematous patches and plaques     Dystrophic onycholytic nail with subungual debris c/w onychomycosis     Umbilicated papule    Erythematous-base heme-crusted tan verrucoid plaque consistent with inflamed seborrheic keratosis     Erythematous Silvery Scaling Plaque c/w Psoriasis     See annotation      Assessment / Plan:          Lentigo  This is a benign hyperpigmented sun induced lesion. Recommend daily sun protection/avoidance and use of at least SPF 30, broad spectrum sunscreen (OTC drug) will reduce the number of new lesions. Treatment of these benign lesions are considered cosmetic.  The nature of sun-induced photo-aging and skin cancers is discussed.  Sun avoidance, protective clothing, and the use of 30-SPF sunscreens is advised. Observe closely  for skin damage/changes, and call if such occurs.    Multiple benign nevi  Discussed ABCDE's of nevi.  Monitor for new mole or moles that are becoming bigger, darker, irritated, or developing irregular borders. Brochure provided. Instructed patient to observe lesion(s) for changes and follow up in clinic if changes are noted. Patient to monitor skin at home for new or changing lesions.     Dermatofibroma  These are benign bundles of scar tissue that can arise spontaneously or after trauma from a bug bite or nicking yourself shaving, or other minimal trauma.   They are very common in middle aged adults and commonly found on the lower legs, arms above the elbows, and trunk.   Removal of lesions is not recommended as the lesion is just replaced with an additional scar, however if lesion is symptomatic, removal can be considered. Lesions can recur.     SK (seborrheic keratosis)  These are benign inherited growths without a malignant potential. Reassurance given to patient. No treatment is necessary.     History of nonmelanoma skin cancer  History of malignant melanoma of skin-  0.6 mm L helix 09/2023  Area of previous melanoma examined. Site well healed with no signs of recurrence.  Stage IA     Total body skin examination performed today including at least 12 points as noted in physical examination. No lesions suspicious for malignancy noted.      Patient health maintenance:    Monthly Self Skin examinations as well as checking melanoma scar and lymph nodes drainage basin for abnormalities as discussed during the visit. https://www.aad.org/public/diseases/skin-cancer/find/check-skin  Regular dermatologic examinations with your board certified dermatologist   Need for sunscreen and sun protection- SPF 30 +, sun protective clothing, regular use of broad brim hat for outdoor exposure, avoidance of sun in peak hours, Heliocare anti-oxidant vitamin for prolonged sun exposure   Family member screening/risk for 1st degree  relatives  Need for yearly checks by dentist and ophthalmology, and GYN if female  Remain up to date on age appropriate cancer screenings  Anti-inflammatory diet and no smoking- smoking can decrease survival of melanoma patients of any stage  Discussed lifestyle modifications that can improve outcomes for melanoma and other cancers including healthy diet, fostering good relationships, stress management, regular exercise, sleeping well, minimizing alcohol intake.       Referral to : no  Referral to hematology/oncology: no  Need for radiologic follow up no  Referral for Total Body Photography no  45+ minutes spent reviewing patients chart including all previous pathology reports pertaining to melanoma, surgical excision report, initial surgical consult, previous mole removals, and discussing all of the above with the patient.     F/U for TBSE 3 months               Follow up in about 3 months (around 7/17/2024) for TBSE with DENNY.

## 2024-04-18 ENCOUNTER — ANTI-COAG VISIT (OUTPATIENT)
Dept: CARDIOLOGY | Facility: CLINIC | Age: 74
End: 2024-04-18
Payer: MEDICARE

## 2024-04-18 DIAGNOSIS — Z79.01 LONG TERM CURRENT USE OF ANTICOAGULANT THERAPY: ICD-10-CM

## 2024-04-18 DIAGNOSIS — D68.61 ANTI-PHOSPHOLIPID ANTIBODY SYNDROME: Primary | Chronic | ICD-10-CM

## 2024-04-18 DIAGNOSIS — M32.19 OTHER SYSTEMIC LUPUS ERYTHEMATOSUS WITH OTHER ORGAN INVOLVEMENT: ICD-10-CM

## 2024-04-18 PROCEDURE — 93793 ANTICOAG MGMT PT WARFARIN: CPT | Mod: S$GLB,,,

## 2024-04-18 RX ORDER — CELECOXIB 200 MG/1
CAPSULE ORAL
Qty: 20 CAPSULE | Refills: 0 | Status: SHIPPED | OUTPATIENT
Start: 2024-04-18 | End: 2024-05-15

## 2024-04-18 NOTE — PROGRESS NOTES
[facility]    2024 8:17 AM    Assessment/Plan:    Patient presents today with subtherapeutic  INR.    Assessment of patient findings and chart review: no significant findings     Recommendation for patient's warfarin regimen: Boost dose today to 7.5mg then resume current maintenance dose    Recommend repeat INR in 2 weeks  _________________________________________________________________    Viraj Olsen (73 y.o.) is followed by the [facility].    Anticoagulation Summary  As of 2024      INR goal:  2.5-3.5   TTR:  68.7% (11.7 y)   INR used for dosin.4 (2024)   Warfarin maintenance plan:  10 mg (5 mg x 2) every Mon, Wed, Fri; 5 mg (5 mg x 1) all other days   Weekly warfarin total:  50 mg   Plan last modified:  Erna Cha, PharmD (12/15/2023)   Next INR check:  2024   Target end date:      Indications    Other forms of systemic lupus erythematosus [M32.8]  Impending cerebrovascular accident (Resolved) [I63.9]  Anti-phospholipid antibody syndrome [D68.61]  Long term current use of anticoagulant therapy [Z79.01]                 Anticoagulation Episode Summary       INR check location:  Clinic Lab    Preferred lab:  [facility]    Send INR reminders to:  [facility] COUMADIN MONITORING POOL    Comments:  [facility] only Mon - Thu (INRs ) // Antiphospholipid - must go to lab          Anticoagulation Care Providers       Provider Role Specialty Phone number    Preet Quintero MD Responsible Rheumatology [phone]

## 2024-04-21 PROBLEM — S76.301A RIGHT HAMSTRING INJURY: Status: ACTIVE | Noted: 2024-04-21

## 2024-04-21 NOTE — PROGRESS NOTES
Subjective:      Patient ID: Viraj Olsen is a 73 y.o. male.    Chief Complaint:   Pain of the Right Knee and Pain of the Right Hip    HPI  He has an established patient of the practice who comes in complaining of pain in the right hip and knee.  He is a regular exerciser and was doing some exercise that was sort of like lunges when he started having increased pain in the right lower extremity.  Then, he noticed the development of bruising on the medial half of his thigh from mid-thigh down to just below the knee.  He has some pain where he sits in the buttock area, and also down behind the knee.  He is chronically anticoagulated by warfarin with a target INR of 2.5 which she has been staying close to according to his lab reports.  His symptoms of pain in this extremity have been getting better since this issue began about 2 weeks ago.      Objective:        Ortho/SPM Exam  On exam he does have full active extension of the knee, but with some pain behind the knee with full extension.  He has flexion to 115° without difficulty.  Negative Stinchfield, negative C sign.  Negative FADIR, negative flip test.  There is extensive ecchymosis as described above.  The skin is intact.  He has some tenderness just distal to the ischial tuberosity, and just proximal to the fibular head.        IMAGING:  X-rays of his hips and knees show mild arthrosis only in all 4, without significant changes compared to remote views.  No acute findings.  Assessment:   Hamstring strain      Plan:   I think he has basically pulled a hamstring muscle and experienced some bleeding because of his anticoagulation, which compounded the symptoms.  However, this seems to be improving, so no new interventions are indicated currently.  I told him if this did not continue to get better and basically fade away over the next few weeks, he should feel free to contact me for further evaluation.    The patient's pathophysiology was explained in detail with  reference to x-rays, models, other visual aids as appropriate.  Treatment options were discussed in detail.  Questions were invited and answered to the patient's satisfaction.      Cuate Rivas MD  Orthopedic Surgery

## 2024-05-01 ENCOUNTER — LAB VISIT (OUTPATIENT)
Dept: LAB | Facility: HOSPITAL | Age: 74
End: 2024-05-01
Attending: INTERNAL MEDICINE
Payer: MEDICARE

## 2024-05-01 ENCOUNTER — ANTI-COAG VISIT (OUTPATIENT)
Dept: CARDIOLOGY | Facility: CLINIC | Age: 74
End: 2024-05-01
Payer: MEDICARE

## 2024-05-01 DIAGNOSIS — D68.61 ANTI-PHOSPHOLIPID ANTIBODY SYNDROME: Chronic | ICD-10-CM

## 2024-05-01 DIAGNOSIS — M32.19 OTHER SYSTEMIC LUPUS ERYTHEMATOSUS WITH OTHER ORGAN INVOLVEMENT: ICD-10-CM

## 2024-05-01 DIAGNOSIS — D68.61 ANTI-PHOSPHOLIPID ANTIBODY SYNDROME: Primary | Chronic | ICD-10-CM

## 2024-05-01 DIAGNOSIS — Z79.01 LONG TERM CURRENT USE OF ANTICOAGULANT THERAPY: ICD-10-CM

## 2024-05-01 LAB
INR PPP: 2.1 (ref 0.8–1.2)
PROTHROMBIN TIME: 22.2 SEC (ref 9–12.5)

## 2024-05-01 PROCEDURE — 85610 PROTHROMBIN TIME: CPT | Performed by: INTERNAL MEDICINE

## 2024-05-01 PROCEDURE — 36415 COLL VENOUS BLD VENIPUNCTURE: CPT | Mod: PO | Performed by: INTERNAL MEDICINE

## 2024-05-01 PROCEDURE — 93793 ANTICOAG MGMT PT WARFARIN: CPT | Mod: S$GLB,,,

## 2024-05-01 NOTE — PROGRESS NOTES
[facility]    2024 4:18 PM    Assessment/Plan:    Patient presents today with subtherapeutic  INR.    Assessment of patient findings and chart review: Eating a mixed green salad 1x/week consistently     Recommendation for patient's warfarin regimen: Boost dose today to 15mg then increase maintenance dose    Recommend repeat INR in 2 weeks  _________________________________________________________________    Viraj Olsen (73 y.o.) is followed by the [facility].    Anticoagulation Summary  As of 2024      INR goal:  2.5-3.5   TTR:  68.4% (11.8 y)   INR used for dosin.1 (2024)   Warfarin maintenance plan:  5 mg (5 mg x 1) every Sun, Tue, Thu; 10 mg (5 mg x 2) all other days   Weekly warfarin total:  55 mg   Plan last modified:  Camelia Christopher, PharmD (2024)   Next INR check:  5/15/2024   Target end date:      Indications    Other forms of systemic lupus erythematosus [M32.8]  Impending cerebrovascular accident (Resolved) [I63.9]  Anti-phospholipid antibody syndrome [D68.61]  Long term current use of anticoagulant therapy [Z79.01]                 Anticoagulation Episode Summary       INR check location:  Clinic Lab    Preferred lab:  [facility]    Send INR reminders to:  [facility] COUMADIN MONITORING POOL    Comments:  [facility] only Mon - Thu (INRs ) // Antiphospholipid - must go to lab          Anticoagulation Care Providers       Provider Role Specialty Phone number    Preet Quintero MD Responsible Rheumatology [phone]            Patient Findings       Positives:  Change in diet/appetite    Negatives:  Signs/symptoms of thrombosis, Signs/symptoms of bleeding, Laboratory test error suspected, Change in health, Change in alcohol use, Change in activity, Upcoming invasive procedure, Emergency department visit, Upcoming dental procedure, Missed doses,  Extra doses, Change in medications, Hospital admission, Bruising, Other complaints    Comments:  Confirmed taking correct dose  Reports eating a spring mixed salad consistently once per week   No other change reports

## 2024-05-15 ENCOUNTER — LAB VISIT (OUTPATIENT)
Dept: LAB | Facility: HOSPITAL | Age: 74
End: 2024-05-15
Attending: INTERNAL MEDICINE
Payer: MEDICARE

## 2024-05-15 ENCOUNTER — PATIENT MESSAGE (OUTPATIENT)
Dept: RHEUMATOLOGY | Facility: CLINIC | Age: 74
End: 2024-05-15
Payer: MEDICARE

## 2024-05-15 DIAGNOSIS — M17.30 POST-TRAUMATIC OSTEOARTHRITIS OF KNEE, UNSPECIFIED LATERALITY: ICD-10-CM

## 2024-05-15 DIAGNOSIS — D68.61 ANTI-PHOSPHOLIPID ANTIBODY SYNDROME: Chronic | ICD-10-CM

## 2024-05-15 DIAGNOSIS — Z79.01 LONG TERM CURRENT USE OF ANTICOAGULANT THERAPY: ICD-10-CM

## 2024-05-15 DIAGNOSIS — M32.19 OTHER SYSTEMIC LUPUS ERYTHEMATOSUS WITH OTHER ORGAN INVOLVEMENT: ICD-10-CM

## 2024-05-15 LAB
INR PPP: 2.4 (ref 0.8–1.2)
PROTHROMBIN TIME: 24.6 SEC (ref 9–12.5)

## 2024-05-15 PROCEDURE — 36415 COLL VENOUS BLD VENIPUNCTURE: CPT | Mod: HCNC,PO | Performed by: INTERNAL MEDICINE

## 2024-05-15 PROCEDURE — 85610 PROTHROMBIN TIME: CPT | Mod: HCNC | Performed by: INTERNAL MEDICINE

## 2024-05-15 RX ORDER — CELECOXIB 200 MG/1
CAPSULE ORAL
Qty: 50 CAPSULE | Refills: 3 | Status: SHIPPED | OUTPATIENT
Start: 2024-05-15

## 2024-05-16 ENCOUNTER — ANTI-COAG VISIT (OUTPATIENT)
Dept: CARDIOLOGY | Facility: CLINIC | Age: 74
End: 2024-05-16
Payer: MEDICARE

## 2024-05-16 DIAGNOSIS — D68.61 ANTI-PHOSPHOLIPID ANTIBODY SYNDROME: Primary | Chronic | ICD-10-CM

## 2024-05-16 DIAGNOSIS — M32.19 OTHER SYSTEMIC LUPUS ERYTHEMATOSUS WITH OTHER ORGAN INVOLVEMENT: ICD-10-CM

## 2024-05-16 DIAGNOSIS — Z79.01 LONG TERM CURRENT USE OF ANTICOAGULANT THERAPY: ICD-10-CM

## 2024-05-16 PROCEDURE — 93793 ANTICOAG MGMT PT WARFARIN: CPT | Mod: S$GLB,,,

## 2024-05-16 NOTE — PROGRESS NOTES
[facility]    2024 8:17 AM    Assessment/Plan:    Patient presents today with subtherapeutic  INR.    Assessment of patient findings and chart review: no significant findings     Recommendation for patient's warfarin regimen: Increase maintenance dose    Recommend repeat INR in 2 weeks  _________________________________________________________________    Viraj [place]ido (73 y.o.) is followed by the [facility].    Anticoagulation Summary  As of 2024      INR goal:  2.5-3.5   TTR:  68.2% (11.8 y)   INR used for dosin.4 (5/15/2024)   Warfarin maintenance plan:  5 mg (5 mg x 1) every Sun, Tu; 10 mg (5 mg x 2) all other days   Weekly warfarin total:  60 mg   Plan last modified:  Camelia Christopher, PharmD (2024)   Next INR check:  2024   Target end date:      Indications    Other forms of systemic lupus erythematosus [M32.8]  Impending cerebrovascular accident (Resolved) [I63.9]  Anti-phospholipid antibody syndrome [D68.61]  Long term current use of anticoagulant therapy [Z79.01]                 Anticoagulation Episode Summary       INR check location:  Clinic Lab    Preferred lab:  [facility]    Send INR reminders to:  [facility] COUMADIN MONITORING POOL    Comments:  [facility] only Mon - Thu (INRs ) // Antiphospholipid - must go to lab          Anticoagulation Care Providers       Provider Role Specialty Phone number    Preet Quintero MD Responsible Rheumatology [phone]

## 2024-05-29 ENCOUNTER — PATIENT MESSAGE (OUTPATIENT)
Dept: RHEUMATOLOGY | Facility: CLINIC | Age: 74
End: 2024-05-29
Payer: MEDICARE

## 2024-05-29 ENCOUNTER — ANTI-COAG VISIT (OUTPATIENT)
Dept: CARDIOLOGY | Facility: CLINIC | Age: 74
End: 2024-05-29
Payer: MEDICARE

## 2024-05-29 ENCOUNTER — PATIENT MESSAGE (OUTPATIENT)
Dept: CARDIOLOGY | Facility: CLINIC | Age: 74
End: 2024-05-29

## 2024-05-29 ENCOUNTER — LAB VISIT (OUTPATIENT)
Dept: LAB | Facility: HOSPITAL | Age: 74
End: 2024-05-29
Attending: INTERNAL MEDICINE
Payer: MEDICARE

## 2024-05-29 DIAGNOSIS — Z79.01 LONG TERM CURRENT USE OF ANTICOAGULANT THERAPY: ICD-10-CM

## 2024-05-29 DIAGNOSIS — D68.61 ANTI-PHOSPHOLIPID ANTIBODY SYNDROME: Primary | Chronic | ICD-10-CM

## 2024-05-29 DIAGNOSIS — M32.19 OTHER SYSTEMIC LUPUS ERYTHEMATOSUS WITH OTHER ORGAN INVOLVEMENT: ICD-10-CM

## 2024-05-29 DIAGNOSIS — D68.61 ANTI-PHOSPHOLIPID ANTIBODY SYNDROME: Chronic | ICD-10-CM

## 2024-05-29 LAB
INR PPP: 3.7 (ref 0.8–1.2)
PROTHROMBIN TIME: 37.1 SEC (ref 9–12.5)

## 2024-05-29 PROCEDURE — 93793 ANTICOAG MGMT PT WARFARIN: CPT | Mod: S$GLB,,,

## 2024-05-29 PROCEDURE — 85610 PROTHROMBIN TIME: CPT | Mod: HCNC | Performed by: INTERNAL MEDICINE

## 2024-05-29 PROCEDURE — 36415 COLL VENOUS BLD VENIPUNCTURE: CPT | Mod: HCNC,PO | Performed by: INTERNAL MEDICINE

## 2024-05-29 RX ORDER — HYDROXYCHLOROQUINE SULFATE 200 MG/1
TABLET, FILM COATED ORAL
Qty: 180 TABLET | Refills: 0 | Status: SHIPPED | OUTPATIENT
Start: 2024-05-29

## 2024-05-29 NOTE — PROGRESS NOTES
[facility]    05/29/2024 4:00 PM    Assessment/Plan:    Patient presents today with supratherapeutic INR.    Assessment of patient findings and chart review: Reports he will be out of town for the next 4 weeks     Recommendation for patient's warfarin regimen: Lower dose today to 5mg then resume current maintenance dose    Recommend repeat INR in 4 weeks when patient returns  _________________________________________________________________    Viraj Olsen (73 y.o.) is followed by the [facility].    Anticoagulation Summary  As of 5/29/2024      INR goal:  2.5-3.5   TTR:  68.2% (11.8 y)   INR used for dosing:  3.7 (5/29/2024)   Warfarin maintenance plan:  5 mg (5 mg x 1) every Sun, Tue; 10 mg (5 mg x 2) all other days   Weekly warfarin total:  60 mg   Plan last modified:  Camelia Christopher, PharmD (5/16/2024)   Next INR check:  6/26/2024   Target end date:      Indications    Other forms of systemic lupus erythematosus [M32.8]  Impending cerebrovascular accident (Resolved) [I63.9]  Anti-phospholipid antibody syndrome [D68.61]  Long term current use of anticoagulant therapy [Z79.01]                 Anticoagulation Episode Summary       INR check location:  Clinic Lab    Preferred lab:  [facility]    Send INR reminders to:  [facility] COUMADIN MONITORING POOL    Comments:  [facility] only Mon - Thu (INRs Thursdays) // Antiphospholipid - must go to lab          Anticoagulation Care Providers       Provider Role Specialty Phone number    Preet Quintero MD Responsible Rheumatology [phone]

## 2024-06-02 DIAGNOSIS — D68.61 ANTI-PHOSPHOLIPID ANTIBODY SYNDROME: ICD-10-CM

## 2024-06-02 DIAGNOSIS — Z79.01 LONG TERM CURRENT USE OF ANTICOAGULANT THERAPY: ICD-10-CM

## 2024-06-02 DIAGNOSIS — M32.9 LUPUS (SYSTEMIC LUPUS ERYTHEMATOSUS): ICD-10-CM

## 2024-06-03 RX ORDER — WARFARIN SODIUM 5 MG/1
TABLET ORAL
Qty: 195 TABLET | Refills: 0 | Status: SHIPPED | OUTPATIENT
Start: 2024-06-03

## 2024-06-05 DIAGNOSIS — G25.81 RESTLESS LEGS SYNDROME (RLS): Chronic | ICD-10-CM

## 2024-06-06 RX ORDER — ROPINIROLE 0.5 MG/1
0.5 TABLET, FILM COATED ORAL NIGHTLY PRN
Qty: 90 TABLET | Refills: 3 | Status: SHIPPED | OUTPATIENT
Start: 2024-06-06 | End: 2025-06-06

## 2024-07-02 ENCOUNTER — LAB VISIT (OUTPATIENT)
Dept: LAB | Facility: HOSPITAL | Age: 74
End: 2024-07-02
Attending: INTERNAL MEDICINE
Payer: MEDICARE

## 2024-07-02 ENCOUNTER — ANTI-COAG VISIT (OUTPATIENT)
Dept: CARDIOLOGY | Facility: CLINIC | Age: 74
End: 2024-07-02
Payer: MEDICARE

## 2024-07-02 DIAGNOSIS — Z79.01 LONG TERM CURRENT USE OF ANTICOAGULANT THERAPY: ICD-10-CM

## 2024-07-02 DIAGNOSIS — D68.61 ANTI-PHOSPHOLIPID ANTIBODY SYNDROME: Chronic | ICD-10-CM

## 2024-07-02 DIAGNOSIS — M32.19 OTHER SYSTEMIC LUPUS ERYTHEMATOSUS WITH OTHER ORGAN INVOLVEMENT: Primary | ICD-10-CM

## 2024-07-02 DIAGNOSIS — M32.19 OTHER SYSTEMIC LUPUS ERYTHEMATOSUS WITH OTHER ORGAN INVOLVEMENT: ICD-10-CM

## 2024-07-02 LAB
INR PPP: 3.1 (ref 0.8–1.2)
PROTHROMBIN TIME: 31.3 SEC (ref 9–12.5)

## 2024-07-02 PROCEDURE — 85610 PROTHROMBIN TIME: CPT | Mod: HCNC | Performed by: INTERNAL MEDICINE

## 2024-07-02 PROCEDURE — 36415 COLL VENOUS BLD VENIPUNCTURE: CPT | Mod: HCNC,PO | Performed by: INTERNAL MEDICINE

## 2024-07-02 PROCEDURE — 93793 ANTICOAG MGMT PT WARFARIN: CPT | Mod: S$GLB,,, | Performed by: PHARMACIST

## 2024-07-17 ENCOUNTER — OFFICE VISIT (OUTPATIENT)
Dept: FAMILY MEDICINE | Facility: CLINIC | Age: 74
End: 2024-07-17
Payer: MEDICARE

## 2024-07-17 VITALS
SYSTOLIC BLOOD PRESSURE: 110 MMHG | OXYGEN SATURATION: 96 % | WEIGHT: 176.69 LBS | HEIGHT: 70 IN | HEART RATE: 74 BPM | DIASTOLIC BLOOD PRESSURE: 64 MMHG | BODY MASS INDEX: 25.3 KG/M2

## 2024-07-17 DIAGNOSIS — J84.10 CALCIFIED GRANULOMA OF LUNG: Chronic | ICD-10-CM

## 2024-07-17 DIAGNOSIS — Z00.00 ENCOUNTER FOR PREVENTIVE HEALTH EXAMINATION: Primary | ICD-10-CM

## 2024-07-17 DIAGNOSIS — D68.61 ANTI-PHOSPHOLIPID ANTIBODY SYNDROME: Chronic | ICD-10-CM

## 2024-07-17 DIAGNOSIS — H91.93 BILATERAL HEARING LOSS, UNSPECIFIED HEARING LOSS TYPE: ICD-10-CM

## 2024-07-17 DIAGNOSIS — D84.821 DRUG-INDUCED IMMUNODEFICIENCY: ICD-10-CM

## 2024-07-17 DIAGNOSIS — M32.8 OTHER FORMS OF SYSTEMIC LUPUS ERYTHEMATOSUS, UNSPECIFIED ORGAN INVOLVEMENT STATUS: ICD-10-CM

## 2024-07-17 DIAGNOSIS — Z79.899 DRUG-INDUCED IMMUNODEFICIENCY: ICD-10-CM

## 2024-07-17 DIAGNOSIS — I70.0 AORTIC ATHEROSCLEROSIS: ICD-10-CM

## 2024-07-17 PROCEDURE — 1158F ADVNC CARE PLAN TLK DOCD: CPT | Mod: HCNC,CPTII,S$GLB, | Performed by: NURSE PRACTITIONER

## 2024-07-17 PROCEDURE — 99999 PR PBB SHADOW E&M-EST. PATIENT-LVL V: CPT | Mod: PBBFAC,HCNC,, | Performed by: NURSE PRACTITIONER

## 2024-07-17 PROCEDURE — 3074F SYST BP LT 130 MM HG: CPT | Mod: HCNC,CPTII,S$GLB, | Performed by: NURSE PRACTITIONER

## 2024-07-17 PROCEDURE — 1159F MED LIST DOCD IN RCRD: CPT | Mod: HCNC,CPTII,S$GLB, | Performed by: NURSE PRACTITIONER

## 2024-07-17 PROCEDURE — 1126F AMNT PAIN NOTED NONE PRSNT: CPT | Mod: HCNC,CPTII,S$GLB, | Performed by: NURSE PRACTITIONER

## 2024-07-17 PROCEDURE — 1101F PT FALLS ASSESS-DOCD LE1/YR: CPT | Mod: HCNC,CPTII,S$GLB, | Performed by: NURSE PRACTITIONER

## 2024-07-17 PROCEDURE — G0439 PPPS, SUBSEQ VISIT: HCPCS | Mod: HCNC,S$GLB,, | Performed by: NURSE PRACTITIONER

## 2024-07-17 PROCEDURE — 3078F DIAST BP <80 MM HG: CPT | Mod: HCNC,CPTII,S$GLB, | Performed by: NURSE PRACTITIONER

## 2024-07-17 PROCEDURE — 3288F FALL RISK ASSESSMENT DOCD: CPT | Mod: HCNC,CPTII,S$GLB, | Performed by: NURSE PRACTITIONER

## 2024-07-17 PROCEDURE — 1160F RVW MEDS BY RX/DR IN RCRD: CPT | Mod: HCNC,CPTII,S$GLB, | Performed by: NURSE PRACTITIONER

## 2024-07-17 NOTE — PROGRESS NOTES
"  Viraj Olsen presented for a  Medicare AWV and comprehensive Health Risk Assessment today. The following components were reviewed and updated:    Medical history  Family History  Social history  Allergies and Current Medications  Health Risk Assessment  Health Maintenance  Care Team         ** See Completed Assessments for Annual Wellness Visit within the encounter summary.**         The following assessments were completed:  Living Situation  CAGE  Depression Screening  Timed Get Up and Go  Whisper Test  Cognitive Function Screening      Nutrition Screening  ADL Screening  PAQ Screening      Opioid documentation:      Patient does not have a current opioid prescription.        Vitals:    07/17/24 1459   BP: 110/64   BP Location: Left arm   Patient Position: Sitting   BP Method: Medium (Manual)   Pulse: 74   SpO2: 96%   Weight: 80.2 kg (176 lb 11.2 oz)   Height: 5' 10" (1.778 m)     Body mass index is 25.35 kg/m².    Physical Exam  Vitals reviewed.   Constitutional:       General: He is not in acute distress.     Appearance: Normal appearance. He is well-developed and well-groomed.   HENT:      Head: Normocephalic.   Eyes:      General:         Right eye: No discharge.   Cardiovascular:      Rate and Rhythm: Normal rate.   Pulmonary:      Effort: Pulmonary effort is normal. No respiratory distress.   Abdominal:      General: There is no distension.   Skin:     Coloration: Skin is not pale.   Neurological:      Mental Status: He is alert and oriented to person, place, and time.      Coordination: Coordination normal.      Gait: Gait normal.   Psychiatric:         Attention and Perception: Attention normal.         Mood and Affect: Mood and affect normal.         Speech: Speech normal.         Behavior: Behavior normal. Behavior is cooperative.         Thought Content: Thought content normal.             Diagnoses and health risks identified today and associated recommendations/orders:    1. Encounter for " preventive health examination    2. Drug-induced immunodeficiency  Chronic SLE; stable on Plaquenil. Followed by Rheumatology.    3. Other forms of systemic lupus erythematosus, unspecified organ involvement status  Chronic; stable on Plaquenil. Followed by Rheumatology.    4. Aortic atherosclerosis  Chronic; stable on statin medication. Follow up with PCP.    5. Anti-phospholipid antibody syndrome  Chronic; stable. Followed by Rheumatology.    6. Bilateral hearing loss, unspecified hearing loss type  - Ambulatory referral/consult to Audiology; Future  - Ambulatory referral/consult to ENT; Future    7. Calcified granuloma of lung - seen on CT Cardiac 8/19/22  As seen on imaging; follow up with PCP.    8. Body mass index (BMI) of 25.0 to 25.9 in adult  Eat a low salt/low fat diet and discussed importance of engaging in physical activity at least 5x/week for a minimum of 30 min/day.      Provided Viraj with a 5-10 year written screening schedule and personal prevention plan. Recommendations were developed using the USPSTF age appropriate recommendations. Education, counseling, and referrals were provided as needed. After Visit Summary given to patient which includes a list of additional screenings/tests needed.    Follow up for your next annual wellness visit.    Karey Ponce NP      Advance Care Planning     I offered to discuss advanced care planning, including how to pick a person who would make decisions for you if you were unable to make them for yourself, called a health care power of , and what kind of decisions you might make such as use of life sustaining treatments such as ventilators and tube feeding when faced with a life limiting illness recorded on a living will that they will need to know. (How you want to be cared for as you near the end of your natural life)     X Patient is interested in learning more about how to make advanced directives.  I provided them paperwork and offered to  discuss this with them.

## 2024-07-17 NOTE — PATIENT INSTRUCTIONS
Counseling and Referral of Other Preventative  (Italic type indicates deductible and co-insurance are waived)    Patient Name: Viraj Olsen  Today's Date: 7/17/2024    Health Maintenance       Date Due Completion Date    Shingles Vaccine (1 of 2) Never done ---    RSV Vaccine (Age 60+ and Pregnant patients) (1 - 1-dose 60+ series) Never done ---    Colorectal Cancer Screening 12/08/2024 (Originally 1950) 12/8/2023    Influenza Vaccine (1) 09/01/2024 10/5/2023    Override on 12/2/2019: Done    PROSTATE-SPECIFIC ANTIGEN 10/04/2024 10/4/2023    High Dose Statin 03/04/2025 3/4/2024    TETANUS VACCINE 06/03/2026 6/3/2016 (Declined)    Override on 6/3/2016: Declined    Lipid Panel 10/04/2028 10/4/2023        Orders Placed This Encounter   Procedures    Ambulatory referral/consult to Audiology    Ambulatory referral/consult to ENT       The following information is provided to all patients.  This information is to help you find resources for any of the problems found today that may be affecting your health:                  Living healthy guide: www.[facility].[place].gov      Understanding Diabetes: www.diabetes.org      Eating healthy: www.cdc.gov/healthyweight      CDC home safety checklist: www.cdc.gov/steadi/patient.html      Agency on Aging: www.goea.[place].[facility]      Alcoholics anonymous (AA): www.aa.org      Physical Activity: www.morris.nih.gov/jk4reeb      Tobacco use: www.quitwithusla.org

## 2024-07-18 PROBLEM — J98.4 CALCIFIED GRANULOMA OF LUNG: Chronic | Status: ACTIVE | Noted: 2024-07-18

## 2024-07-18 PROBLEM — J84.10 CALCIFIED GRANULOMA OF LUNG: Chronic | Status: ACTIVE | Noted: 2024-07-18

## 2024-07-18 PROBLEM — H91.93 BILATERAL HEARING LOSS: Status: ACTIVE | Noted: 2024-07-18

## 2024-07-18 PROBLEM — Z85.820 PERSONAL HISTORY OF MALIGNANT MELANOMA: Status: ACTIVE | Noted: 2023-10-10

## 2024-07-25 ENCOUNTER — OFFICE VISIT (OUTPATIENT)
Dept: DERMATOLOGY | Facility: CLINIC | Age: 74
End: 2024-07-25
Payer: MEDICARE

## 2024-07-25 DIAGNOSIS — D22.9 MULTIPLE BENIGN NEVI: ICD-10-CM

## 2024-07-25 DIAGNOSIS — L82.1 SK (SEBORRHEIC KERATOSIS): ICD-10-CM

## 2024-07-25 DIAGNOSIS — Z85.820 HISTORY OF MALIGNANT MELANOMA OF SKIN: ICD-10-CM

## 2024-07-25 DIAGNOSIS — Z85.828 HISTORY OF NONMELANOMA SKIN CANCER: ICD-10-CM

## 2024-07-25 DIAGNOSIS — L57.0 AK (ACTINIC KERATOSIS): Primary | ICD-10-CM

## 2024-07-25 DIAGNOSIS — D23.9 DERMATOFIBROMA: ICD-10-CM

## 2024-07-25 DIAGNOSIS — L81.4 LENTIGO: ICD-10-CM

## 2024-07-25 PROCEDURE — 99999 PR PBB SHADOW E&M-EST. PATIENT-LVL III: CPT | Mod: PBBFAC,HCNC,, | Performed by: DERMATOLOGY

## 2024-07-25 PROCEDURE — 99213 OFFICE O/P EST LOW 20 MIN: CPT | Mod: 25,HCNC,S$GLB, | Performed by: DERMATOLOGY

## 2024-07-25 PROCEDURE — 3288F FALL RISK ASSESSMENT DOCD: CPT | Mod: HCNC,CPTII,S$GLB, | Performed by: DERMATOLOGY

## 2024-07-25 PROCEDURE — 1160F RVW MEDS BY RX/DR IN RCRD: CPT | Mod: HCNC,CPTII,S$GLB, | Performed by: DERMATOLOGY

## 2024-07-25 PROCEDURE — 1126F AMNT PAIN NOTED NONE PRSNT: CPT | Mod: HCNC,CPTII,S$GLB, | Performed by: DERMATOLOGY

## 2024-07-25 PROCEDURE — 1159F MED LIST DOCD IN RCRD: CPT | Mod: HCNC,CPTII,S$GLB, | Performed by: DERMATOLOGY

## 2024-07-25 PROCEDURE — 1101F PT FALLS ASSESS-DOCD LE1/YR: CPT | Mod: HCNC,CPTII,S$GLB, | Performed by: DERMATOLOGY

## 2024-07-25 PROCEDURE — 17000 DESTRUCT PREMALG LESION: CPT | Mod: HCNC,S$GLB,, | Performed by: DERMATOLOGY

## 2024-07-25 NOTE — PROGRESS NOTES
Subjective:      Patient ID:  Viraj Olsen is a 73 y.o. male who presents for   Chief Complaint   Patient presents with    Skin Check     Tbse     History of Present Illness: The patient presents for follow up of skin check.    The patient was last seen on: 4/17/2024 by BRAYAN in [facility].   This is a high risk patient here to check for the development of new lesions.    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 Seen 4/2024 by BRAYAN in [facility]. Stage 1A    Other skin complaints: none          Review of Systems   Constitutional:  Negative for fever, chills, weight loss, fatigue, night sweats and malaise.   HENT:  Negative for headaches.    Respiratory:  Negative for cough and shortness of breath.    Gastrointestinal:  Negative for indigestion.   Skin:  Positive for activity-related sunscreen use. Negative for daily sunscreen use, recent sunburn and wears hat.   Neurological:  Negative for headaches.   Hematologic/Lymphatic: Negative for adenopathy. Does not bruise/bleed easily.       Objective:   Physical Exam   Constitutional: He appears well-developed and well-nourished. No distress.   Neurological: He is alert and oriented to person, place, and time. He is not disoriented.   Psychiatric: He has a normal mood and affect.   Skin:   Areas Examined (abnormalities noted in diagram):   Scalp / Hair Palpated and Inspected  Head / Face Inspection Performed  Neck Inspection Performed  Chest / Axilla Inspection Performed  Abdomen Inspection Performed  Genitals / Buttocks / Groin Inspection Performed  Back Inspection Performed  RUE Inspected  LUE Inspection Performed  RLE Inspected  LLE Inspection Performed  Nails and Digits Inspection Performed                 Diagram Legend     Erythematous scaling macule/papule c/w actinic keratosis       Vascular papule c/w angioma      Pigmented verrucoid papule/plaque c/w seborrheic keratosis      Yellow umbilicated papule c/w sebaceous hyperplasia      Irregularly  shaped tan macule c/w lentigo     1-2 mm smooth white papules consistent with Milia      Movable subcutaneous cyst with punctum c/w epidermal inclusion cyst      Subcutaneous movable cyst c/w pilar cyst      Firm pink to brown papule c/w dermatofibroma      Pedunculated fleshy papule(s) c/w skin tag(s)      Evenly pigmented macule c/w junctional nevus     Mildly variegated pigmented, slightly irregular-bordered macule c/w mildly atypical nevus      Flesh colored to evenly pigmented papule c/w intradermal nevus       Pink pearly papule/plaque c/w basal cell carcinoma      Erythematous hyperkeratotic cursted plaque c/w SCC      Surgical scar with no sign of skin cancer recurrence      Open and closed comedones      Inflammatory papules and pustules      Verrucoid papule consistent consistent with wart     Erythematous eczematous patches and plaques     Dystrophic onycholytic nail with subungual debris c/w onychomycosis     Umbilicated papule    Erythematous-base heme-crusted tan verrucoid plaque consistent with inflamed seborrheic keratosis     Erythematous Silvery Scaling Plaque c/w Psoriasis     See annotation      Assessment / Plan:        AK (actinic keratosis)  Cryosurgery Procedure Note    Verbal consent from the patient is obtained including, but not limited to, risk of hypopigmentation/hyperpigmentation, scar, recurrence of lesion. The patient is aware of the precancerous quality and need for treatment of these lesions. Liquid nitrogen cryosurgery is applied to the 1 actinic keratoses, as detailed in the physical exam, to produce a freeze injury. The patient is aware that blisters may form and is instructed on wound care with gentle cleansing and use of vaseline ointment to keep moist until healed. The patient is supplied a handout on cryosurgery and is instructed to call if lesions do not completely resolve.      Multiple benign nevi   - minor problem and chronic.   Reassurance given to patient. No treatment  necessary.       Lentigo   - minor problem and chronic.   Reassurance given to patient. No treatment necessary.       SK (seborrheic keratosis)  These are benign inherited growths without a malignant potential. Reassurance given to patient. No treatment is necessary.       Dermatofibromas   - minor problem and chronic.   Reassurance given to patient. No treatment necessary.       History of nonmelanoma skin cancer and History of malignant melanoma of skin  Area of previous melanoma and nmsc examined. Site well healed with no signs of recurrence.    Total body skin examination performed today including at least 12 points as noted in physical examination. No lesions suspicious for malignancy noted.    Recommend daily sun protection/avoidance, use of at least SPF 30, broad spectrum sunscreen (OTC drug), skin self examinations, and routine physician surveillance to optimize early detection               Follow up in about 3 months (around 10/25/2024) for TBSE.

## 2024-07-25 NOTE — PATIENT INSTRUCTIONS

## 2024-08-08 ENCOUNTER — ANTI-COAG VISIT (OUTPATIENT)
Dept: CARDIOLOGY | Facility: CLINIC | Age: 74
End: 2024-08-08
Payer: MEDICARE

## 2024-08-08 ENCOUNTER — LAB VISIT (OUTPATIENT)
Dept: LAB | Facility: HOSPITAL | Age: 74
End: 2024-08-08
Attending: INTERNAL MEDICINE
Payer: MEDICARE

## 2024-08-08 DIAGNOSIS — Z79.01 LONG TERM CURRENT USE OF ANTICOAGULANT THERAPY: ICD-10-CM

## 2024-08-08 DIAGNOSIS — M32.19 OTHER SYSTEMIC LUPUS ERYTHEMATOSUS WITH OTHER ORGAN INVOLVEMENT: Primary | ICD-10-CM

## 2024-08-08 DIAGNOSIS — D68.61 ANTI-PHOSPHOLIPID ANTIBODY SYNDROME: Chronic | ICD-10-CM

## 2024-08-08 DIAGNOSIS — M32.19 OTHER SYSTEMIC LUPUS ERYTHEMATOSUS WITH OTHER ORGAN INVOLVEMENT: ICD-10-CM

## 2024-08-08 LAB
INR PPP: 2.4 (ref 0.8–1.2)
PROTHROMBIN TIME: 25.1 SEC (ref 9–12.5)

## 2024-08-08 PROCEDURE — 93793 ANTICOAG MGMT PT WARFARIN: CPT | Mod: S$GLB,,,

## 2024-08-08 PROCEDURE — 36415 COLL VENOUS BLD VENIPUNCTURE: CPT | Mod: HCNC,PO | Performed by: INTERNAL MEDICINE

## 2024-08-08 PROCEDURE — 85610 PROTHROMBIN TIME: CPT | Mod: HCNC | Performed by: INTERNAL MEDICINE

## 2024-08-24 DIAGNOSIS — Z79.01 LONG TERM CURRENT USE OF ANTICOAGULANT THERAPY: ICD-10-CM

## 2024-08-24 DIAGNOSIS — D68.61 ANTI-PHOSPHOLIPID ANTIBODY SYNDROME: ICD-10-CM

## 2024-08-24 DIAGNOSIS — M32.9 LUPUS (SYSTEMIC LUPUS ERYTHEMATOSUS): ICD-10-CM

## 2024-08-26 RX ORDER — WARFARIN SODIUM 5 MG/1
TABLET ORAL
Qty: 195 TABLET | Refills: 0 | Status: SHIPPED | OUTPATIENT
Start: 2024-08-26

## 2024-08-29 ENCOUNTER — ANTI-COAG VISIT (OUTPATIENT)
Dept: CARDIOLOGY | Facility: CLINIC | Age: 74
End: 2024-08-29
Payer: MEDICARE

## 2024-08-29 ENCOUNTER — LAB VISIT (OUTPATIENT)
Dept: LAB | Facility: HOSPITAL | Age: 74
End: 2024-08-29
Attending: INTERNAL MEDICINE
Payer: MEDICARE

## 2024-08-29 DIAGNOSIS — Z79.01 LONG TERM CURRENT USE OF ANTICOAGULANT THERAPY: ICD-10-CM

## 2024-08-29 DIAGNOSIS — M32.19 OTHER SYSTEMIC LUPUS ERYTHEMATOSUS WITH OTHER ORGAN INVOLVEMENT: ICD-10-CM

## 2024-08-29 DIAGNOSIS — D68.61 ANTI-PHOSPHOLIPID ANTIBODY SYNDROME: Primary | Chronic | ICD-10-CM

## 2024-08-29 DIAGNOSIS — D68.61 ANTI-PHOSPHOLIPID ANTIBODY SYNDROME: Chronic | ICD-10-CM

## 2024-08-29 LAB
INR PPP: 2.3 (ref 0.8–1.2)
PROTHROMBIN TIME: 23.5 SEC (ref 9–12.5)

## 2024-08-29 PROCEDURE — 85610 PROTHROMBIN TIME: CPT | Mod: HCNC | Performed by: INTERNAL MEDICINE

## 2024-08-29 PROCEDURE — 93793 ANTICOAG MGMT PT WARFARIN: CPT | Mod: S$GLB,,,

## 2024-08-29 NOTE — PROGRESS NOTES
[facility]    2024 4:36 PM    Assessment/Plan:    Patient presents today with subtherapeutic  INR.    Assessment of patient findings and chart review: no significant findings     Recommendation for patient's warfarin regimen: Boost dose today to 15mg then increase maintenance dose    Recommend repeat INR in 2 weeks  _________________________________________________________________    Viraj Olsen (73 y.o.) is followed by the [facility].    Anticoagulation Summary  As of 2024      INR goal:  2.5-3.5   TTR:  68.0% (12.1 y)   INR used for dosin.3 (2024)   Warfarin maintenance plan:  5 mg (5 mg x 1) every Sun; 10 mg (5 mg x 2) all other days   Weekly warfarin total:  65 mg   Plan last modified:  Camelia Christopher, PharmD (2024)   Next INR check:  2024   Target end date:      Indications    Other forms of systemic lupus erythematosus [M32.8]  Impending cerebrovascular accident (Resolved) [I63.9]  Anti-phospholipid antibody syndrome [D68.61]  Long term current use of anticoagulant therapy [Z79.01]                 Anticoagulation Episode Summary       INR check location:  Clinic Lab    Preferred lab:  [facility]    Send INR reminders to:  [facility] COUMADIN MONITORING POOL    Comments:  [facility] only Mon - Thu (INRs ) // Antiphospholipid - must go to lab          Anticoagulation Care Providers       Provider Role Specialty Phone number    Preet Quintero MD Responsible Rheumatology [phone]

## 2024-09-16 ENCOUNTER — TELEPHONE (OUTPATIENT)
Dept: OTOLARYNGOLOGY | Facility: CLINIC | Age: 74
End: 2024-09-16
Payer: MEDICARE

## 2024-09-16 ENCOUNTER — PATIENT OUTREACH (OUTPATIENT)
Dept: ADMINISTRATIVE | Facility: HOSPITAL | Age: 74
End: 2024-09-16
Payer: MEDICARE

## 2024-09-16 NOTE — TELEPHONE ENCOUNTER
Left message on voicemail to reschedule his audio and ENT appointment from 9/17/24 to 9/18/24 due to audio smith not available due to water intake from [place].

## 2024-09-18 ENCOUNTER — CLINICAL SUPPORT (OUTPATIENT)
Dept: OTOLARYNGOLOGY | Facility: CLINIC | Age: 74
End: 2024-09-18
Payer: MEDICARE

## 2024-09-18 ENCOUNTER — OFFICE VISIT (OUTPATIENT)
Dept: URGENT CARE | Facility: CLINIC | Age: 74
End: 2024-09-18
Payer: MEDICARE

## 2024-09-18 ENCOUNTER — OFFICE VISIT (OUTPATIENT)
Dept: OTOLARYNGOLOGY | Facility: CLINIC | Age: 74
End: 2024-09-18
Payer: MEDICARE

## 2024-09-18 VITALS
HEART RATE: 78 BPM | BODY MASS INDEX: 26.05 KG/M2 | SYSTOLIC BLOOD PRESSURE: 105 MMHG | RESPIRATION RATE: 20 BRPM | OXYGEN SATURATION: 98 % | HEIGHT: 70 IN | DIASTOLIC BLOOD PRESSURE: 62 MMHG | WEIGHT: 182 LBS | TEMPERATURE: 98 F

## 2024-09-18 VITALS
BODY MASS INDEX: 26.18 KG/M2 | HEART RATE: 68 BPM | DIASTOLIC BLOOD PRESSURE: 68 MMHG | WEIGHT: 182.44 LBS | SYSTOLIC BLOOD PRESSURE: 111 MMHG

## 2024-09-18 DIAGNOSIS — H91.8X3 ASYMMETRICAL HEARING LOSS: Chronic | ICD-10-CM

## 2024-09-18 DIAGNOSIS — S61.012A LACERATION OF LEFT THUMB WITHOUT FOREIGN BODY WITHOUT DAMAGE TO NAIL, INITIAL ENCOUNTER: Primary | ICD-10-CM

## 2024-09-18 DIAGNOSIS — H90.3 SENSORINEURAL HEARING LOSS (SNHL) OF BOTH EARS: Primary | Chronic | ICD-10-CM

## 2024-09-18 DIAGNOSIS — H61.21 IMPACTED CERUMEN OF RIGHT EAR: ICD-10-CM

## 2024-09-18 DIAGNOSIS — H60.63 CHRONIC OTITIS EXTERNA OF BOTH EARS, UNSPECIFIED TYPE: Chronic | ICD-10-CM

## 2024-09-18 DIAGNOSIS — H91.93 BILATERAL HEARING LOSS, UNSPECIFIED HEARING LOSS TYPE: ICD-10-CM

## 2024-09-18 DIAGNOSIS — H90.3 SENSORINEURAL HEARING LOSS, BILATERAL: Primary | ICD-10-CM

## 2024-09-18 PROCEDURE — 1101F PT FALLS ASSESS-DOCD LE1/YR: CPT | Mod: HCNC,CPTII,S$GLB, | Performed by: OTOLARYNGOLOGY

## 2024-09-18 PROCEDURE — 99213 OFFICE O/P EST LOW 20 MIN: CPT | Mod: 25,S$GLB,,

## 2024-09-18 PROCEDURE — 92557 COMPREHENSIVE HEARING TEST: CPT | Mod: HCNC,S$GLB,, | Performed by: PHYSICIAN ASSISTANT

## 2024-09-18 PROCEDURE — 1160F RVW MEDS BY RX/DR IN RCRD: CPT | Mod: HCNC,CPTII,S$GLB, | Performed by: OTOLARYNGOLOGY

## 2024-09-18 PROCEDURE — 3074F SYST BP LT 130 MM HG: CPT | Mod: HCNC,CPTII,S$GLB, | Performed by: OTOLARYNGOLOGY

## 2024-09-18 PROCEDURE — 3008F BODY MASS INDEX DOCD: CPT | Mod: HCNC,CPTII,S$GLB, | Performed by: OTOLARYNGOLOGY

## 2024-09-18 PROCEDURE — 90715 TDAP VACCINE 7 YRS/> IM: CPT | Mod: S$GLB,,,

## 2024-09-18 PROCEDURE — 99999 PR PBB SHADOW E&M-EST. PATIENT-LVL IV: CPT | Mod: PBBFAC,HCNC,, | Performed by: OTOLARYNGOLOGY

## 2024-09-18 PROCEDURE — 90471 IMMUNIZATION ADMIN: CPT | Mod: S$GLB,,,

## 2024-09-18 PROCEDURE — 3078F DIAST BP <80 MM HG: CPT | Mod: HCNC,CPTII,S$GLB, | Performed by: OTOLARYNGOLOGY

## 2024-09-18 PROCEDURE — 12001 RPR S/N/AX/GEN/TRNK 2.5CM/<: CPT | Mod: S$GLB,,,

## 2024-09-18 PROCEDURE — 92567 TYMPANOMETRY: CPT | Mod: HCNC,S$GLB,, | Performed by: PHYSICIAN ASSISTANT

## 2024-09-18 PROCEDURE — 1126F AMNT PAIN NOTED NONE PRSNT: CPT | Mod: HCNC,CPTII,S$GLB, | Performed by: OTOLARYNGOLOGY

## 2024-09-18 PROCEDURE — 99999 PR PBB SHADOW E&M-EST. PATIENT-LVL I: CPT | Mod: PBBFAC,HCNC,, | Performed by: PHYSICIAN ASSISTANT

## 2024-09-18 PROCEDURE — 99214 OFFICE O/P EST MOD 30 MIN: CPT | Mod: 25,HCNC,S$GLB, | Performed by: OTOLARYNGOLOGY

## 2024-09-18 PROCEDURE — 3288F FALL RISK ASSESSMENT DOCD: CPT | Mod: HCNC,CPTII,S$GLB, | Performed by: OTOLARYNGOLOGY

## 2024-09-18 PROCEDURE — 1159F MED LIST DOCD IN RCRD: CPT | Mod: HCNC,CPTII,S$GLB, | Performed by: OTOLARYNGOLOGY

## 2024-09-18 PROCEDURE — 69210 REMOVE IMPACTED EAR WAX UNI: CPT | Mod: HCNC,S$GLB,, | Performed by: OTOLARYNGOLOGY

## 2024-09-18 NOTE — PROGRESS NOTES
"Subjective:      Patient ID: Viraj Olsen is a 73 y.o. male.    Vitals:  height is 5' 10" (1.778 m) and weight is 82.6 kg (182 lb). His oral temperature is 98 °F (36.7 °C). His blood pressure is 105/62 and his pulse is 78. His respiration is 20 and oxygen saturation is 98%.     Chief Complaint: Laceration    Pt was cutting some branches with a chain saw when it cut his left thumb. Bleeding controlled upon arrival. Unknown if TD is UTD.   Incident occurred 15 mins to arrival      Provider note    74 yo M c/o laceration to left thumb just prior to arrival while using a chain saw at home. He is not utd on tetanus and is on blood thinners. No at home treatment.     Laceration   The incident occurred 1 to 3 hours ago. Pain location: left finger.       Skin:  Positive for laceration.      Objective:     Physical Exam   Constitutional: He is oriented to person, place, and time. normal  HENT:   Head: Normocephalic and atraumatic.   Eyes: Conjunctivae are normal.   Abdominal: Normal appearance.   Musculoskeletal: Normal range of motion.         General: Normal range of motion.      Comments: NV intact   Neurological: He is alert and oriented to person, place, and time.   Skin:         Comments: .5 cm superficial laceration noted to ventral surface of left thumb       Assessment:     1. Laceration of left thumb without foreign body without damage to nail, initial encounter        Plan:   Educated to not submerge wound in water for 7-10 days while wound is healing. Gentle soap and water. No antibiotic ointment necessary. RTC if symptoms worsen.     Laceration of left thumb without foreign body without damage to nail, initial encounter  -     Tdap (BOOSTRIX) vaccine injection 0.5 mL  -     Laceration Repair        We appreciate you trusting us with your medical care. We hope you feel better soon. We will be happy to take care of you for all of your future medical needs.  You must understand that you've received an Urgent Care " treatment only and that you may be released before all your medical problems are known or treated. You, the patient, will arrange for follow up care as instructed.  Follow up with your PCP or specialty clinic as directed in the next 1-2 weeks if not improved or as needed.  You can call [phone] to schedule an appointment with the appropriate provider.  Another option is to follow up with [facility] ([email or web address]) virtually for quick simple medical advice.  If your condition worsens we recommend that you receive another evaluation at the emergency room immediately or contact your primary medical clinics after hours call service to discuss your concerns.  Please return here or go to the Emergency Department for any concerns or worsening of condition.

## 2024-09-18 NOTE — PROGRESS NOTES
Viraj Olsen, a 73 y.o. male, was seen today in the clinic for an audiologic evaluation.  Patients main complaint was bilateral hearing loss that has been progressing over the last several years.  He denies ear pain or ear drainage.  He reports intermittent tinnitus.  He denies a family history of hearing loss but does admit to a history of noise exposure working in a [employer] for 44 years.      Audiogram results revealed a mild high frequency sensorineural hearing loss bilaterally.  Speech reception thresholds were noted at 10 dB in the right ear and 10 dB in the left ear.  Speech discrimination scores were 92% in the right ear and 92% in the left ear.  Tympanometry revealed Type A in the right ear and Type As in the left ear. The results of the audiogram were reviewed with the patient and the benefits of amplification were discussed.    Recommendations:  Otologic evaluation  Annual audiogram  Hearing protection when in noise  Hearing aid consultation when patient is ready

## 2024-09-18 NOTE — PROCEDURES
Laceration Repair    Date/Time: 9/18/2024 2:00 PM    Performed by: Catherine Lewis NP  Authorized by: Catherine Lewis NP  Body area: upper extremity  Location details: left hand  Laceration length: 0.5 cm  Foreign bodies: no foreign bodies  Tendon involvement: none  Nerve involvement: none  Vascular damage: no  Irrigation solution: saline  Amount of cleaning: standard  Debridement: none  Skin closure: glue  Approximation: close  Approximation difficulty: simple  Dressing: 4x4 sterile gauze and pressure/compression dressing  Patient tolerance: Patient tolerated the procedure well with no immediate complications

## 2024-09-18 NOTE — PATIENT INSTRUCTIONS
Audiogram was reviewed in detail with the patient, and they understand their hearing loss.    If and when they are a candidate for hearing aids, they were given information on how to contact the audiologist for this appointment.  I recommend annual audiograms as well as hearing protection in noise.     I would recommend the use of a wax softening drop, either over the counter baby oil, vitamin E oil, or mineral oil, on a weekly basis.  Use debrox kit monthly to flush ears.    I also instructed the patient to avoid Qtips.

## 2024-09-18 NOTE — PROGRESS NOTES
Chief Complaint   Patient presents with    Hearing Loss        HPI: Patient is a very pleasant 73 y.o. male here to see me today for the first time for evaluation of hearing loss.  He reports hearing loss that has been gradually progressing over the last few year(s).  He has not noted any difference in hearing between the ears, with both ears being the better hearing ear.  He has not noted any tinnitus in either ear.  He has not had any recent issues with ear pain or ear drainage.  He denies a family history of hearing loss, and has not had any previous otologic surgery.  He has a history of significant loud noise exposure.  He previously received annual audiograms due to occupational noise exposure.  He has previously had issues with dizziness, but none currently.                Past Medical History:   Diagnosis Date    Anti-phospholipid antibody syndrome 06/21/2012    Basal cell carcinoma 09/11/2023    left post auricular neck- excised by HAKANW    Cataract     Leukopenia 06/07/2013    SLE associated    Livedo reticularis 06/07/2013    Lupus     Melanoma 10/24/2023    left ear helix 0.6mm    Post-traumatic osteoarthritis of knee 04/01/2020     Social History     Socioeconomic History    Marital status:    Tobacco Use    Smoking status: Never     Passive exposure: Never    Smokeless tobacco: Former     Types: Chew    Tobacco comments:     1.5 yrs of chewing tobacco during the summer   Substance and Sexual Activity    Alcohol use: Not Currently     Comment: social    Drug use: No    Sexual activity: Yes     Partners: Female     Social Determinants of Health     Financial Resource Strain: Low Risk  (7/17/2024)    Overall Financial Resource Strain (CARDIA)     Difficulty of Paying Living Expenses: Not hard at all   Food Insecurity: No Food Insecurity (7/17/2024)    Hunger Vital Sign     Worried About Running Out of Food in the Last Year: Never true     Ran Out of Food in the Last Year: Never true    Transportation Needs: No Transportation Needs (7/17/2024)    PRAPARE - Transportation     Lack of Transportation (Medical): No     Lack of Transportation (Non-Medical): No   Physical Activity: Sufficiently Active (7/17/2024)    Exercise Vital Sign     Days of Exercise per Week: 7 days     Minutes of Exercise per Session: 150+ min   Stress: No Stress Concern Present (7/17/2024)    [employer] - Occupational Stress Questionnaire     Feeling of Stress : Not at all   Housing Stability: Low Risk  (7/17/2024)    Housing Stability Vital Sign     Unable to Pay for Housing in the Last Year: No     Homeless in the Last Year: No     Past Surgical History:   Procedure Laterality Date    abdondectomy      APPENDECTOMY      COLONOSCOPY N/A 12/28/2016    Procedure: COLONOSCOPY;  Surgeon: Hillary García MD;  Location: [facility];  Service: Endoscopy;  Laterality: N/A;    TONSILLECTOMY       Family History   Problem Relation Name Age of Onset    Lupus Mother      Hypertension Father      Heart disease Father      No Known Problems Son x2     No Known Problems Maternal Aunt      No Known Problems Maternal Uncle      No Known Problems Paternal Aunt      No Known Problems Paternal Uncle      No Known Problems Maternal Grandmother      No Known Problems Maternal Grandfather      No Known Problems Paternal Grandmother      No Known Problems Paternal Grandfather      Amblyopia Neg Hx      Blindness Neg Hx      Cancer Neg Hx      Cataracts Neg Hx      Diabetes Neg Hx      Glaucoma Neg Hx      Macular degeneration Neg Hx      Retinal detachment Neg Hx      Strabismus Neg Hx      Stroke Neg Hx      Thyroid disease Neg Hx             Review of Systems  General: negative for chills, fever or weight loss  Psychological: negative for mood changes or depression  Ophthalmic: negative for blurry vision, photophobia or eye pain  ENT: see HPI  Respiratory: no cough, shortness of breath, or  wheezing  Cardiovascular: no chest pain or dyspnea on exertion  Gastrointestinal: no abdominal pain, change in bowel habits, or black/ bloody stools  Musculoskeletal: negative for gait disturbance or muscular weakness  Neurological: no syncope or seizures; no ataxia  Dermatological: negative for pruritis,  rash and jaundice  Hematologic/lymphatic: no easy bruising, no new adenopathy      Physical Exam:    Vitals:    09/18/24 1144   BP: 111/68   Pulse: 68         Constitutional:   He is oriented to person, place, and time. Vital signs are normal. He appears well-developed and well-nourished. He appears alert. He is cooperative.  Non-toxic appearance. Normal speech.      Head:  Normocephalic and atraumatic. Salivary glands normal.  Facial strength is normal.      Ears:    Right Ear: Tympanic membrane is not perforated, not erythematous and not retracted. No middle ear effusion.   Left Ear: Tympanic membrane is not perforated, not erythematous and not retracted.  No middle ear effusion.   Ears:    Dry skin and skin irritation bilateral ear canals, consistent with chronic otitis externa.      Nose:  Mucosal edema present. No rhinorrhea, septal deviation, nasal septal hematoma or polyps. No epistaxis. No turbinate masses and no turbinate hypertrophy (2+ size).  Right sinus exhibits no maxillary sinus tenderness and no frontal sinus tenderness. Left sinus exhibits no maxillary sinus tenderness and no frontal sinus tenderness.     Mouth/Throat  Oropharynx clear and moist without lesions or asymmetry, normal uvula midline, lips, teeth, and gums normal and oropharynx normal. Normal dentition. No uvula swelling or oral lesions. No oropharyngeal exudate, posterior oropharyngeal edema or posterior oropharyngeal erythema. Mirror exam not performed due to patient tolerance.  Mirror exam not performed due to patient tolerance.      Neck:  Neck normal without thyromegaly masses, asymmetry, normal tracheal structure, crepitus, and  tenderness, thyroid normal, trachea normal, full range of motion with neck supple and no adenopathy. No JVD present. Carotid bruit is not present. Thyroid tenderness is present. No edema and no erythema present. No thyroid mass and no thyromegaly present.     He has no cervical adenopathy.     Cardiovascular:    Normal rate, regular rhythm, normal heart sounds and rate and rhythm, heart sounds, and pulses normal.              Pulmonary/Chest:   Effort and breath sounds normal.     Psychiatric:   He has a normal mood and affect. His speech is normal and behavior is normal.     Neurological:   He is alert and oriented to person, place, and time. He has neurological normal, alert and oriented. No cranial nerve deficit.     Skin:   No abrasions, lacerations, lesions, or rashes.     Ear Cerumen Removal    Date/Time: 9/18/2024 11:20 AM    Performed by: Radha Hoff MD  Authorized by: Radha Hoff MD    Consent Done?:  Yes (Verbal)    Local anesthetic:  None  Location details:  Right ear  Procedure type: curette    Procedure type comment:  Suction  Cerumen  Removal Results:  Cerumen completely removed  Patient tolerance:  Patient tolerated the procedure well with no immediate complications     Dry skin and skin irritation bilateral ear canals, consistent with chronic otitis externa.          Audiogram: Interpreted by me and reviewed with the patient today.  Bilateral sensorineural hearing loss, mild.  Slightly worse on right in high frequencies.  Tympanograms type a bilaterally.  Speech discrimination equal.        Assessment:    ICD-10-CM ICD-9-CM    1. Sensorineural hearing loss (SNHL) of both ears  H90.3 389.18 Ambulatory referral/consult to ENT      2. Asymmetrical hearing loss  H91.8X3 389.9       3. Chronic otitis externa of both ears, unspecified type  H60.63 380.23       4. Impacted cerumen of right ear  H61.21 380.4 Ear Cerumen Removal        The primary encounter diagnosis was Sensorineural hearing loss  (SNHL) of both ears. Diagnoses of Asymmetrical hearing loss, Chronic otitis externa of both ears, unspecified type, and Impacted cerumen of right ear were also pertinent to this visit.      Plan:    I would recommend the use of a wax softening drop, either over the counter baby oil, vitamin E oil, or mineral oil, on a weekly basis.  Use debrox kit monthly to flush ears.    I also instructed the patient to avoid Qtips.     Audiogram was reviewed in detail with the patient, and they understand their hearing loss.    If and when they are a candidate for hearing aids, they were given information on how to contact the audiologist for this appointment.  I recommend annual audiograms as well as hearing protection in noise.        Radha Hoff MD

## 2024-09-23 ENCOUNTER — ANTI-COAG VISIT (OUTPATIENT)
Dept: CARDIOLOGY | Facility: CLINIC | Age: 74
End: 2024-09-23
Payer: MEDICARE

## 2024-09-23 ENCOUNTER — LAB VISIT (OUTPATIENT)
Dept: LAB | Facility: HOSPITAL | Age: 74
End: 2024-09-23
Attending: FAMILY MEDICINE
Payer: MEDICARE

## 2024-09-23 DIAGNOSIS — Z79.01 LONG TERM CURRENT USE OF ANTICOAGULANT THERAPY: ICD-10-CM

## 2024-09-23 DIAGNOSIS — M32.19 OTHER SYSTEMIC LUPUS ERYTHEMATOSUS WITH OTHER ORGAN INVOLVEMENT: ICD-10-CM

## 2024-09-23 DIAGNOSIS — D68.61 ANTI-PHOSPHOLIPID ANTIBODY SYNDROME: Chronic | ICD-10-CM

## 2024-09-23 DIAGNOSIS — D68.61 ANTI-PHOSPHOLIPID ANTIBODY SYNDROME: Primary | Chronic | ICD-10-CM

## 2024-09-23 LAB
INR PPP: 3.2 (ref 0.8–1.2)
PROTHROMBIN TIME: 31.9 SEC (ref 9–12.5)

## 2024-09-23 PROCEDURE — 85610 PROTHROMBIN TIME: CPT | Mod: HCNC | Performed by: INTERNAL MEDICINE

## 2024-09-23 PROCEDURE — 93793 ANTICOAG MGMT PT WARFARIN: CPT | Mod: S$GLB,,,

## 2024-09-23 PROCEDURE — 36415 COLL VENOUS BLD VENIPUNCTURE: CPT | Mod: HCNC | Performed by: INTERNAL MEDICINE

## 2024-09-23 NOTE — PROGRESS NOTES
[facility]    09/23/2024 1:24 PM    Assessment/Plan:    Patient presents today with therapeutic INR.    Assessment of patient findings and chart review: no significant findings     Recommendation for patient's warfarin regimen: Continue current maintenance dose    Recommend repeat INR in 2 weeks  _________________________________________________________________    Viraj Olsen (73 y.o.) is followed by the [facility].    Anticoagulation Summary  As of 9/23/2024      INR goal:  2.5-3.5   TTR:  68.1% (12.2 y)   INR used for dosing:  3.2 (9/23/2024)   Warfarin maintenance plan:  5 mg (5 mg x 1) every Sun; 10 mg (5 mg x 2) all other days   Weekly warfarin total:  65 mg   Plan last modified:  Camelia Christopher, PharmD (8/29/2024)   Next INR check:  10/9/2024   Target end date:      Indications    Other forms of systemic lupus erythematosus [M32.8]  Impending cerebrovascular accident (Resolved) [I63.9]  Anti-phospholipid antibody syndrome [D68.61]  Long term current use of anticoagulant therapy [Z79.01]                 Anticoagulation Episode Summary       INR check location:  Clinic Lab    Preferred lab:  [facility]    Send INR reminders to:  [facility] COUMADIN MONITORING POOL    Comments:  [facility] only Mon - Thu (INRs Thursdays) // Antiphospholipid - must go to lab          Anticoagulation Care Providers       Provider Role Specialty Phone number    Preet Quintero MD Responsible Rheumatology [phone]

## 2024-09-28 ENCOUNTER — TELEPHONE (OUTPATIENT)
Dept: FAMILY MEDICINE | Facility: CLINIC | Age: 74
End: 2024-09-28
Payer: MEDICARE

## 2024-09-28 DIAGNOSIS — D68.61 ANTI-PHOSPHOLIPID ANTIBODY SYNDROME: ICD-10-CM

## 2024-09-28 DIAGNOSIS — Z79.01 LONG TERM CURRENT USE OF ANTICOAGULANT THERAPY: ICD-10-CM

## 2024-09-28 DIAGNOSIS — Z13.6 ENCOUNTER FOR SCREENING FOR CARDIOVASCULAR DISORDERS: ICD-10-CM

## 2024-09-28 DIAGNOSIS — Z12.5 ENCOUNTER FOR SCREENING FOR MALIGNANT NEOPLASM OF PROSTATE: ICD-10-CM

## 2024-09-28 DIAGNOSIS — M32.9 LUPUS (SYSTEMIC LUPUS ERYTHEMATOSUS): ICD-10-CM

## 2024-09-28 DIAGNOSIS — Z00.00 ROUTINE GENERAL MEDICAL EXAMINATION AT HEALTH CARE FACILITY: Primary | ICD-10-CM

## 2024-09-30 RX ORDER — WARFARIN SODIUM 5 MG/1
TABLET ORAL
Qty: 195 TABLET | Refills: 0 | Status: SHIPPED | OUTPATIENT
Start: 2024-09-30

## 2024-10-03 ENCOUNTER — LAB VISIT (OUTPATIENT)
Dept: LAB | Facility: HOSPITAL | Age: 74
End: 2024-10-03
Attending: FAMILY MEDICINE
Payer: MEDICARE

## 2024-10-03 DIAGNOSIS — D68.61 ANTI-PHOSPHOLIPID ANTIBODY SYNDROME: ICD-10-CM

## 2024-10-03 DIAGNOSIS — Z13.6 ENCOUNTER FOR SCREENING FOR CARDIOVASCULAR DISORDERS: ICD-10-CM

## 2024-10-03 DIAGNOSIS — Z12.5 ENCOUNTER FOR SCREENING FOR MALIGNANT NEOPLASM OF PROSTATE: ICD-10-CM

## 2024-10-03 DIAGNOSIS — Z00.00 ROUTINE GENERAL MEDICAL EXAMINATION AT HEALTH CARE FACILITY: ICD-10-CM

## 2024-10-03 LAB
ALBUMIN SERPL BCP-MCNC: 4.2 G/DL (ref 3.5–5.2)
ALP SERPL-CCNC: 63 U/L (ref 55–135)
ALT SERPL W/O P-5'-P-CCNC: 21 U/L (ref 10–44)
ANION GAP SERPL CALC-SCNC: 7 MMOL/L (ref 8–16)
AST SERPL-CCNC: 27 U/L (ref 10–40)
BASOPHILS # BLD AUTO: 0.04 K/UL (ref 0–0.2)
BASOPHILS NFR BLD: 1 % (ref 0–1.9)
BILIRUB SERPL-MCNC: 0.4 MG/DL (ref 0.1–1)
BUN SERPL-MCNC: 31 MG/DL (ref 8–23)
CALCIUM SERPL-MCNC: 10.6 MG/DL (ref 8.7–10.5)
CHLORIDE SERPL-SCNC: 106 MMOL/L (ref 95–110)
CHOLEST SERPL-MCNC: 127 MG/DL (ref 120–199)
CHOLEST/HDLC SERPL: 2.4 {RATIO} (ref 2–5)
CO2 SERPL-SCNC: 26 MMOL/L (ref 23–29)
CREAT SERPL-MCNC: 1.1 MG/DL (ref 0.5–1.4)
DIFFERENTIAL METHOD BLD: ABNORMAL
EOSINOPHIL # BLD AUTO: 0.2 K/UL (ref 0–0.5)
EOSINOPHIL NFR BLD: 4.8 % (ref 0–8)
ERYTHROCYTE [DISTWIDTH] IN BLOOD BY AUTOMATED COUNT: 12.7 % (ref 11.5–14.5)
EST. GFR  (NO RACE VARIABLE): >60 ML/MIN/1.73 M^2
GLUCOSE SERPL-MCNC: 82 MG/DL (ref 70–110)
HCT VFR BLD AUTO: 36.9 % (ref 40–54)
HDLC SERPL-MCNC: 52 MG/DL (ref 40–75)
HDLC SERPL: 40.9 % (ref 20–50)
HGB BLD-MCNC: 12.4 G/DL (ref 14–18)
IMM GRANULOCYTES # BLD AUTO: 0.01 K/UL (ref 0–0.04)
IMM GRANULOCYTES NFR BLD AUTO: 0.2 % (ref 0–0.5)
LDLC SERPL CALC-MCNC: 63.4 MG/DL (ref 63–159)
LYMPHOCYTES # BLD AUTO: 1.6 K/UL (ref 1–4.8)
LYMPHOCYTES NFR BLD: 37.2 % (ref 18–48)
MCH RBC QN AUTO: 31 PG (ref 27–31)
MCHC RBC AUTO-ENTMCNC: 33.6 G/DL (ref 32–36)
MCV RBC AUTO: 92 FL (ref 82–98)
MONOCYTES # BLD AUTO: 0.5 K/UL (ref 0.3–1)
MONOCYTES NFR BLD: 12.9 % (ref 4–15)
NEUTROPHILS # BLD AUTO: 1.8 K/UL (ref 1.8–7.7)
NEUTROPHILS NFR BLD: 43.9 % (ref 38–73)
NONHDLC SERPL-MCNC: 75 MG/DL
NRBC BLD-RTO: 0 /100 WBC
PLATELET # BLD AUTO: 250 K/UL (ref 150–450)
PMV BLD AUTO: 9.5 FL (ref 9.2–12.9)
POTASSIUM SERPL-SCNC: 4.5 MMOL/L (ref 3.5–5.1)
PROT SERPL-MCNC: 7.2 G/DL (ref 6–8.4)
RBC # BLD AUTO: 4 M/UL (ref 4.6–6.2)
SODIUM SERPL-SCNC: 139 MMOL/L (ref 136–145)
TRIGL SERPL-MCNC: 58 MG/DL (ref 30–150)
WBC # BLD AUTO: 4.19 K/UL (ref 3.9–12.7)

## 2024-10-03 PROCEDURE — 85025 COMPLETE CBC W/AUTO DIFF WBC: CPT | Mod: HCNC | Performed by: FAMILY MEDICINE

## 2024-10-03 PROCEDURE — 36415 COLL VENOUS BLD VENIPUNCTURE: CPT | Mod: HCNC | Performed by: FAMILY MEDICINE

## 2024-10-03 PROCEDURE — 80061 LIPID PANEL: CPT | Mod: HCNC | Performed by: FAMILY MEDICINE

## 2024-10-03 PROCEDURE — 80053 COMPREHEN METABOLIC PANEL: CPT | Mod: HCNC | Performed by: FAMILY MEDICINE

## 2024-10-08 ENCOUNTER — OFFICE VISIT (OUTPATIENT)
Dept: FAMILY MEDICINE | Facility: CLINIC | Age: 74
End: 2024-10-08
Attending: FAMILY MEDICINE
Payer: MEDICARE

## 2024-10-08 ENCOUNTER — LAB VISIT (OUTPATIENT)
Dept: LAB | Facility: HOSPITAL | Age: 74
End: 2024-10-08
Attending: FAMILY MEDICINE
Payer: MEDICARE

## 2024-10-08 VITALS
BODY MASS INDEX: 26.14 KG/M2 | WEIGHT: 182.56 LBS | SYSTOLIC BLOOD PRESSURE: 118 MMHG | HEART RATE: 67 BPM | OXYGEN SATURATION: 97 % | HEIGHT: 70 IN | DIASTOLIC BLOOD PRESSURE: 62 MMHG

## 2024-10-08 DIAGNOSIS — I70.0 AORTIC ATHEROSCLEROSIS: ICD-10-CM

## 2024-10-08 DIAGNOSIS — M32.9 SYSTEMIC LUPUS ERYTHEMATOSUS, UNSPECIFIED SLE TYPE, UNSPECIFIED ORGAN INVOLVEMENT STATUS: ICD-10-CM

## 2024-10-08 DIAGNOSIS — Z12.5 ENCOUNTER FOR SCREENING FOR MALIGNANT NEOPLASM OF PROSTATE: ICD-10-CM

## 2024-10-08 DIAGNOSIS — Z79.01 LONG TERM CURRENT USE OF ANTICOAGULANT THERAPY: ICD-10-CM

## 2024-10-08 DIAGNOSIS — D84.821 DRUG-INDUCED IMMUNODEFICIENCY: ICD-10-CM

## 2024-10-08 DIAGNOSIS — Z79.899 DRUG-INDUCED IMMUNODEFICIENCY: ICD-10-CM

## 2024-10-08 DIAGNOSIS — Z00.00 ROUTINE GENERAL MEDICAL EXAMINATION AT HEALTH CARE FACILITY: ICD-10-CM

## 2024-10-08 DIAGNOSIS — D68.61 ANTI-PHOSPHOLIPID ANTIBODY SYNDROME: ICD-10-CM

## 2024-10-08 DIAGNOSIS — Z23 IMMUNIZATION DUE: Primary | ICD-10-CM

## 2024-10-08 DIAGNOSIS — Z12.11 ENCOUNTER FOR FIT (FECAL IMMUNOCHEMICAL TEST) SCREENING: ICD-10-CM

## 2024-10-08 LAB — COMPLEXED PSA SERPL-MCNC: 0.72 NG/ML (ref 0–4)

## 2024-10-08 PROCEDURE — 3008F BODY MASS INDEX DOCD: CPT | Mod: HCNC,CPTII,S$GLB, | Performed by: FAMILY MEDICINE

## 2024-10-08 PROCEDURE — 1159F MED LIST DOCD IN RCRD: CPT | Mod: HCNC,CPTII,S$GLB, | Performed by: FAMILY MEDICINE

## 2024-10-08 PROCEDURE — 99999 PR PBB SHADOW E&M-EST. PATIENT-LVL III: CPT | Mod: PBBFAC,HCNC,, | Performed by: FAMILY MEDICINE

## 2024-10-08 PROCEDURE — 3074F SYST BP LT 130 MM HG: CPT | Mod: HCNC,CPTII,S$GLB, | Performed by: FAMILY MEDICINE

## 2024-10-08 PROCEDURE — 1101F PT FALLS ASSESS-DOCD LE1/YR: CPT | Mod: HCNC,CPTII,S$GLB, | Performed by: FAMILY MEDICINE

## 2024-10-08 PROCEDURE — 1160F RVW MEDS BY RX/DR IN RCRD: CPT | Mod: HCNC,CPTII,S$GLB, | Performed by: FAMILY MEDICINE

## 2024-10-08 PROCEDURE — 36415 COLL VENOUS BLD VENIPUNCTURE: CPT | Mod: HCNC | Performed by: FAMILY MEDICINE

## 2024-10-08 PROCEDURE — 3288F FALL RISK ASSESSMENT DOCD: CPT | Mod: HCNC,CPTII,S$GLB, | Performed by: FAMILY MEDICINE

## 2024-10-08 PROCEDURE — 84153 ASSAY OF PSA TOTAL: CPT | Mod: HCNC | Performed by: FAMILY MEDICINE

## 2024-10-08 PROCEDURE — 3078F DIAST BP <80 MM HG: CPT | Mod: HCNC,CPTII,S$GLB, | Performed by: FAMILY MEDICINE

## 2024-10-08 PROCEDURE — 99214 OFFICE O/P EST MOD 30 MIN: CPT | Mod: HCNC,S$GLB,, | Performed by: FAMILY MEDICINE

## 2024-10-08 PROCEDURE — G0008 ADMIN INFLUENZA VIRUS VAC: HCPCS | Mod: HCNC,S$GLB,, | Performed by: FAMILY MEDICINE

## 2024-10-08 PROCEDURE — 90653 IIV ADJUVANT VACCINE IM: CPT | Mod: HCNC,S$GLB,, | Performed by: FAMILY MEDICINE

## 2024-10-08 RX ORDER — ATORVASTATIN CALCIUM 40 MG/1
40 TABLET, FILM COATED ORAL DAILY
Qty: 90 TABLET | Refills: 3 | Status: SHIPPED | OUTPATIENT
Start: 2024-10-08

## 2024-10-08 RX ORDER — WARFARIN SODIUM 5 MG/1
TABLET ORAL
Qty: 195 TABLET | Refills: 0 | Status: SHIPPED | OUTPATIENT
Start: 2024-10-08

## 2024-10-08 NOTE — PROGRESS NOTES
Per provider order in chart, vaccine given to patient.  FLU vaccine, for 65 and older.    Left  deltoid   Patient tolerated well.    Aruna Davila, CMA

## 2024-10-08 NOTE — PROGRESS NOTES
Subjective:       Patient ID: Viraj Olsen is a 74 y.o. male.    Chief Complaint: Annual Exam    74 yr old pleasant white male with antiphospholipid antibody syndrome, SLE, RLS, and no other significant chronic medical history presents today for his annual wellness exam and lab work.    APAS/SLE - diagnosed about 20-21 years ago - on coumadin and plaquenil since then - had multiple TIA prior to being diagnosed with APAS 21 years ago - no issues - follows Rheumatology      RLS - controlled       History as below - reviewed in detail      HM  -labs due  -PSA due            Review of Systems   Constitutional: Negative.  Negative for activity change, diaphoresis and unexpected weight change.   HENT: Negative.  Negative for nasal congestion, ear pain, mouth sores, rhinorrhea and voice change.    Eyes: Negative.  Negative for pain, discharge and visual disturbance.   Respiratory: Negative.  Negative for apnea, cough and wheezing.    Cardiovascular: Negative.  Negative for chest pain and palpitations.   Gastrointestinal: Negative.  Negative for abdominal distention, anal bleeding, diarrhea and vomiting.   Endocrine: Negative.  Negative for cold intolerance and polyuria.   Genitourinary: Negative.  Negative for decreased urine volume, difficulty urinating, discharge, frequency and scrotal swelling.   Musculoskeletal: Negative.  Negative for back pain, myalgias and neck stiffness.   Integumentary:  Negative for color change and rash. Negative.   Allergic/Immunologic: Negative.  Negative for environmental allergies.   Neurological: Negative.  Negative for dizziness, speech difficulty, weakness and light-headedness.   Hematological: Negative.    Psychiatric/Behavioral: Negative.  Negative for agitation, dysphoric mood and suicidal ideas. The patient is not nervous/anxious.      Active Ambulatory Problems     Diagnosis Date Noted    Other forms of systemic lupus erythematosus 06/21/2012    Anti-phospholipid antibody syndrome  06/21/2012    Long term current use of anticoagulant therapy 08/02/2012    High risk medication use 05/24/2013    Restless legs syndrome (RLS) 06/07/2013    Livedo reticularis 06/07/2013    Lymphopenia 10/13/2016    Post-traumatic osteoarthritis of knee 04/01/2020    PVCs (premature ventricular contractions) 08/08/2022    Premature atrial contractions 08/08/2022    Nonrheumatic aortic valve stenosis 08/08/2022    Body mass index (BMI) of 25.0 to 25.9 in adult 08/10/2022    Agatston CAC score 200-399 08/20/2022    Lung nodule 08/20/2022    Personal history of malignant melanoma 10/10/2023    Drug-induced immunodeficiency 10/17/2023    Aortic atherosclerosis 10/17/2023    Right hamstring injury 04/21/2024    Calcified granuloma of lung - seen on CT Cardiac 8/19/22 07/18/2024    Bilateral hearing loss 07/18/2024     Resolved Ambulatory Problems     Diagnosis Date Noted    Impending cerebrovascular accident 06/21/2012    Leukopenia 06/07/2013     Past Medical History:   Diagnosis Date    Basal cell carcinoma 09/11/2023    Cataract     Lupus     Melanoma 10/24/2023     Past Surgical History:   Procedure Laterality Date    abdondectomy      APPENDECTOMY      COLONOSCOPY N/A 12/28/2016    Procedure: COLONOSCOPY;  Surgeon: Hillary García MD;  Location: [facility];  Service: Endoscopy;  Laterality: N/A;    TONSILLECTOMY       Family History   Problem Relation Name Age of Onset    Lupus Mother      Hypertension Father      Heart disease Father      No Known Problems Son x2     No Known Problems Maternal Aunt      No Known Problems Maternal Uncle      No Known Problems Paternal Aunt      No Known Problems Paternal Uncle      No Known Problems Maternal Grandmother      No Known Problems Maternal Grandfather      No Known Problems Paternal Grandmother      No Known Problems Paternal Grandfather      Amblyopia Neg Hx      Blindness Neg Hx      Cancer Neg Hx      Cataracts Neg Hx      Diabetes Neg Hx      Glaucoma Neg Hx       Macular degeneration Neg Hx      Retinal detachment Neg Hx      Strabismus Neg Hx      Stroke Neg Hx      Thyroid disease Neg Hx       Social History     Socioeconomic History    Marital status:    Tobacco Use    Smoking status: Never     Passive exposure: Never    Smokeless tobacco: Former     Types: Chew    Tobacco comments:     1.5 yrs of chewing tobacco during the summer   Substance and Sexual Activity    Alcohol use: Not Currently     Comment: social    Drug use: No    Sexual activity: Yes     Partners: Female     Social Drivers of Health     Financial Resource Strain: Low Risk  (7/17/2024)    Overall Financial Resource Strain (CARDIA)     Difficulty of Paying Living Expenses: Not hard at all   Food Insecurity: No Food Insecurity (7/17/2024)    Hunger Vital Sign     Worried About Running Out of Food in the Last Year: Never true     Ran Out of Food in the Last Year: Never true   Transportation Needs: No Transportation Needs (7/17/2024)    PRAPARE - Transportation     Lack of Transportation (Medical): No     Lack of Transportation (Non-Medical): No   Physical Activity: Sufficiently Active (7/17/2024)    Exercise Vital Sign     Days of Exercise per Week: 7 days     Minutes of Exercise per Session: 150+ min   Stress: No Stress Concern Present (7/17/2024)    [employer] - Occupational Stress Questionnaire     Feeling of Stress : Not at all   Housing Stability: Low Risk  (7/17/2024)    Housing Stability Vital Sign     Unable to Pay for Housing in the Last Year: No     Homeless in the Last Year: No     Review of patient's allergies indicates:  No Known Allergies  Current Outpatient Medications on File Prior to Visit   Medication Sig Dispense Refill    aspirin (ECOTRIN) 81 MG EC tablet Take 81 mg by mouth once daily.      b complex vitamins capsule Take 1 capsule by mouth once daily.      CALCIUM CARBONATE/VITAMIN D3 (VITAMIN D-3 ORAL) Take 1 capsule by mouth once daily.       celecoxib (CELEBREX) 200 MG capsule TAKE 1 CAPSULE BY MOUTH DAILY AS NEEDED FOR PAIN. 50 capsule 3    ferrous sulfate 325 (65 FE) MG EC tablet Take 325 mg by mouth once daily.      fish oil-omega-3 fatty acids 300-1,000 mg capsule Take 1 capsule by mouth once daily.      hydroxychloroquine (PLAQUENIL) 200 mg tablet TAKE 1 TABLET BY MOUTH TWICE A  tablet 0    melatonin 3 mg Tab       multivitamin (THERAGRAN) per tablet Take 1 tablet by mouth once daily.      rOPINIRole (REQUIP) 0.5 MG tablet TAKE 1 TABLET (0.5 MG TOTAL) BY MOUTH NIGHTLY AS NEEDED (RESTLESS LEG SYNDROME). 90 tablet 3    zinc sulfate (ZINCATE) 50 mg zinc (220 mg) capsule Take 220 mg by mouth once daily.      [DISCONTINUED] atorvastatin (LIPITOR) 40 MG tablet TAKE 1 TABLET BY MOUTH EVERY DAY 90 tablet 3    [DISCONTINUED] warfarin (COUMADIN) 5 MG tablet TAKE 2 AND 1/2 TABLETS DAILY AND 2 TABLETS ON TUESDAY, THURSDAY AND SUNDAY OR AS DIRECTED BY COUMADIN CLINIC. 195 tablet 0    albuterol (PROVENTIL/VENTOLIN HFA) 90 mcg/actuation inhaler Inhale 2 puffs into the lungs every 6 (six) hours as needed for Wheezing or Shortness of Breath. Rescue 8 g 0    calcium carbonate 220 mg capsule Take 500 mg by mouth once daily. (Patient not taking: Reported on 10/8/2024)      cetirizine (ZYRTEC) 10 MG tablet Take 1 tablet (10 mg total) by mouth once daily. (Patient not taking: Reported on 10/8/2024) 30 tablet 11    mupirocin (BACTROBAN) 2 % ointment Apply to affected area 3 times daily (Patient not taking: Reported on 10/8/2024) 22 g 1     No current facility-administered medications on file prior to visit.           Objective:       Vitals:    10/08/24 0926   BP: 118/62   Pulse: 67       Physical Exam  Constitutional:       Appearance: He is well-developed.   HENT:      Head: Normocephalic and atraumatic.      Right Ear: External ear normal.      Left Ear: External ear normal.      Nose: Nose normal.      Mouth/Throat:      Pharynx: No oropharyngeal exudate.    Eyes:      General: No scleral icterus.        Right eye: No discharge.         Left eye: No discharge.      Conjunctiva/sclera: Conjunctivae normal.      Pupils: Pupils are equal, round, and reactive to light.   Neck:      Thyroid: No thyromegaly.      Vascular: No JVD.      Trachea: No tracheal deviation.   Cardiovascular:      Rate and Rhythm: Normal rate and regular rhythm.      Heart sounds: Murmur (2/6 esm best heard in aortic area) heard.      No friction rub. No gallop.   Pulmonary:      Effort: Pulmonary effort is normal. No respiratory distress.      Breath sounds: Normal breath sounds. No stridor. No wheezing or rales.   Chest:      Chest wall: No tenderness.   Abdominal:      General: Bowel sounds are normal. There is no distension.      Palpations: Abdomen is soft. There is no mass.      Tenderness: There is no abdominal tenderness. There is no guarding or rebound.      Hernia: No hernia is present.   Musculoskeletal:         General: No tenderness. Normal range of motion.      Cervical back: Normal range of motion and neck supple.   Lymphadenopathy:      Cervical: No cervical adenopathy.   Skin:     General: Skin is warm and dry.      Coloration: Skin is not pale.      Findings: No erythema or rash.   Neurological:      Mental Status: He is alert and oriented to person, place, and time.      Cranial Nerves: No cranial nerve deficit.      Motor: No abnormal muscle tone.      Coordination: Coordination normal.      Deep Tendon Reflexes: Reflexes are normal and symmetric. Reflexes normal.   Psychiatric:         Behavior: Behavior normal.         Thought Content: Thought content normal.         Judgment: Judgment normal.           Lab Visit on 10/03/2024   Component Date Value Ref Range Status    WBC 10/03/2024 4.19  3.90 - 12.70 K/uL Final    RBC 10/03/2024 4.00 (L)  4.60 - 6.20 M/uL Final    Hemoglobin 10/03/2024 12.4 (L)  14.0 - 18.0 g/dL Final    Hematocrit 10/03/2024 36.9 (L)  40.0 - 54.0 % Final     MCV 10/03/2024 92  82 - 98 fL Final    MCH 10/03/2024 31.0  27.0 - 31.0 pg Final    MCHC 10/03/2024 33.6  32.0 - 36.0 g/dL Final    RDW 10/03/2024 12.7  11.5 - 14.5 % Final    Platelets 10/03/2024 250  150 - 450 K/uL Final    MPV 10/03/2024 9.5  9.2 - 12.9 fL Final    Immature Granulocytes 10/03/2024 0.2  0.0 - 0.5 % Final    Gran # (ANC) 10/03/2024 1.8  1.8 - 7.7 K/uL Final    Immature Grans (Abs) 10/03/2024 0.01  0.00 - 0.04 K/uL Final    Comment: Mild elevation in immature granulocytes is non specific and   can be seen in a variety of conditions including stress response,   acute inflammation, trauma and pregnancy. Correlation with other   laboratory and clinical findings is essential.      Lymph # 10/03/2024 1.6  1.0 - 4.8 K/uL Final    Mono # 10/03/2024 0.5  0.3 - 1.0 K/uL Final    Eos # 10/03/2024 0.2  0.0 - 0.5 K/uL Final    Baso # 10/03/2024 0.04  0.00 - 0.20 K/uL Final    nRBC 10/03/2024 0  0 /100 WBC Final    Gran % 10/03/2024 43.9  38.0 - 73.0 % Final    Lymph % 10/03/2024 37.2  18.0 - 48.0 % Final    Mono % 10/03/2024 12.9  4.0 - 15.0 % Final    Eosinophil % 10/03/2024 4.8  0.0 - 8.0 % Final    Basophil % 10/03/2024 1.0  0.0 - 1.9 % Final    Differential Method 10/03/2024 Automated   Final    Sodium 10/03/2024 139  136 - 145 mmol/L Final    Potassium 10/03/2024 4.5  3.5 - 5.1 mmol/L Final    Chloride 10/03/2024 106  95 - 110 mmol/L Final    CO2 10/03/2024 26  23 - 29 mmol/L Final    Glucose 10/03/2024 82  70 - 110 mg/dL Final    BUN 10/03/2024 31 (H)  8 - 23 mg/dL Final    Creatinine 10/03/2024 1.1  0.5 - 1.4 mg/dL Final    Calcium 10/03/2024 10.6 (H)  8.7 - 10.5 mg/dL Final    Total Protein 10/03/2024 7.2  6.0 - 8.4 g/dL Final    Albumin 10/03/2024 4.2  3.5 - 5.2 g/dL Final    Total Bilirubin 10/03/2024 0.4  0.1 - 1.0 mg/dL Final    Comment: For infants and newborns, interpretation of results should be based  on gestational age, weight and in agreement with clinical  observations.    Premature Infant  recommended reference ranges:  Up to 24 hours.............<8.0 mg/dL  Up to 48 hours............<12.0 mg/dL  3-5 days..................<15.0 mg/dL  6-29 days.................<15.0 mg/dL      Alkaline Phosphatase 10/03/2024 63  55 - 135 U/L Final    AST 10/03/2024 27  10 - 40 U/L Final    ALT 10/03/2024 21  10 - 44 U/L Final    eGFR 10/03/2024 >60  >60 mL/min/1.73 m^2 Final    Anion Gap 10/03/2024 7 (L)  8 - 16 mmol/L Final    Cholesterol 10/03/2024 127  120 - 199 mg/dL Final    Comment: The National Cholesterol Education Program (NCEP) has set the  following guidelines (reference ranges) for Cholesterol:  Optimal.....................<200 mg/dL  Borderline High.............200-239 mg/dL  High........................> or = 240 mg/dL      Triglycerides 10/03/2024 58  30 - 150 mg/dL Final    Comment: The National Cholesterol Education Program (NCEP) has set the  following guidelines (reference values) for triglycerides:  Normal......................<150 mg/dL  Borderline High.............150-199 mg/dL  High........................200-499 mg/dL      HDL 10/03/2024 52  40 - 75 mg/dL Final    Comment: The National Cholesterol Education Program (NCEP) has set the  following guidelines (reference values) for HDL Cholesterol:  Low...............<40 mg/dL  Optimal...........>60 mg/dL      LDL Cholesterol 10/03/2024 63.4  63.0 - 159.0 mg/dL Final    Comment: The National Cholesterol Education Program (NCEP) has set the  following guidelines (reference values) for LDL Cholesterol:  Optimal.......................<130 mg/dL  Borderline High...............130-159 mg/dL  High..........................160-189 mg/dL  Very High.....................>190 mg/dL      HDL/Cholesterol Ratio 10/03/2024 40.9  20.0 - 50.0 % Final    Total Cholesterol/HDL Ratio 10/03/2024 2.4  2.0 - 5.0 Final    Non-HDL Cholesterol 10/03/2024 75  mg/dL Final    Comment: Risk category and Non-HDL cholesterol goals:  Coronary heart disease (CHD)or equivalent  (10-year risk of CHD >20%):  Non-HDL cholesterol goal     <130 mg/dL  Two or more CHD risk factors and 10-year risk of CHD <= 20%:  Non-HDL cholesterol goal     <160 mg/dL  0 to 1 CHD risk factor:  Non-HDL cholesterol goal     <190 mg/dL     Lab Visit on 09/23/2024   Component Date Value Ref Range Status    Prothrombin Time 09/23/2024 31.9 (H)  9.0 - 12.5 sec Final    INR 09/23/2024 3.2 (H)  0.8 - 1.2 Final    Comment: Coumadin Therapy:  2.0 - 3.0 for INR for all indicators except mechanical heart valves  and antiphospholipid syndromes which should use 2.5 - 3.5.  LOT^[id number]^PT Inn^[id number]         Assessment:       Problem List Items Addressed This Visit       Anti-phospholipid antibody syndrome (Chronic)    Relevant Medications    warfarin (COUMADIN) 5 MG tablet    Long term current use of anticoagulant therapy    Relevant Medications    warfarin (COUMADIN) 5 MG tablet    Drug-induced immunodeficiency    Aortic atherosclerosis    Relevant Medications    atorvastatin (LIPITOR) 40 MG tablet     Other Visit Diagnoses       Immunization due    -  Primary    Relevant Medications    influenza (adjuvanted) (Fluad) 45 mcg/0.5 mL IM vaccine (> or = 66 yo) 0.5 mL    Routine general medical examination at health care facility        Lupus (systemic lupus erythematosus)        Relevant Medications    warfarin (COUMADIN) 5 MG tablet    Encounter for FIT (fecal immunochemical test) screening        Relevant Orders    Fecal Immunochemical Test (iFOBT)    Encounter for screening for malignant neoplasm of prostate        Relevant Orders    PSA, Screening            Plan:           Viraj was seen today for annual exam.    Diagnoses and all orders for this visit:    Immunization due  -     influenza (adjuvanted) (Fluad) 45 mcg/0.5 mL IM vaccine (> or = 66 yo) 0.5 mL    Routine general medical examination at health care facility    Long term current use of anticoagulant therapy  -     warfarin (COUMADIN) 5 MG tablet; TAKE 2 AND 1/2  TABLETS DAILY AND 2 TABLETS ON TUESDAY, THURSDAY AND SUNDAY OR AS DIRECTED BY COUMADIN CLINIC.    Anti-phospholipid antibody syndrome  -     warfarin (COUMADIN) 5 MG tablet; TAKE 2 AND 1/2 TABLETS DAILY AND 2 TABLETS ON TUESDAY, THURSDAY AND SUNDAY OR AS DIRECTED BY COUMADIN CLINIC.    Drug-induced immunodeficiency    Aortic atherosclerosis  -     atorvastatin (LIPITOR) 40 MG tablet; Take 1 tablet (40 mg total) by mouth once daily.    Lupus (systemic lupus erythematosus)  -     warfarin (COUMADIN) 5 MG tablet; TAKE 2 AND 1/2 TABLETS DAILY AND 2 TABLETS ON TUESDAY, THURSDAY AND SUNDAY OR AS DIRECTED BY COUMADIN CLINIC.    Encounter for FIT (fecal immunochemical test) screening  -     Fecal Immunochemical Test (iFOBT); Future    Encounter for screening for malignant neoplasm of prostate  -     PSA, Screening; Future      Wellness check  -normal exam  -labs      APAS/LUPUS  -on plaquenil and coumadin  -follows Rheumatology    Heart murmur  -echo for surveillance    Spent adequate time in obtaining history and detailed exam    30 minutes spent during this visit of which greater than 50% devoted to face-face counseling and coordination of care regarding diagnosis and management plan      Follow up in about 1 year (around 10/8/2025), or if symptoms worsen or fail to improve.

## 2024-10-09 ENCOUNTER — LAB VISIT (OUTPATIENT)
Dept: LAB | Facility: HOSPITAL | Age: 74
End: 2024-10-09
Attending: INTERNAL MEDICINE
Payer: MEDICARE

## 2024-10-09 ENCOUNTER — OFFICE VISIT (OUTPATIENT)
Dept: RHEUMATOLOGY | Facility: CLINIC | Age: 74
End: 2024-10-09
Payer: MEDICARE

## 2024-10-09 ENCOUNTER — ANTI-COAG VISIT (OUTPATIENT)
Dept: CARDIOLOGY | Facility: CLINIC | Age: 74
End: 2024-10-09
Payer: MEDICARE

## 2024-10-09 VITALS
DIASTOLIC BLOOD PRESSURE: 53 MMHG | SYSTOLIC BLOOD PRESSURE: 98 MMHG | BODY MASS INDEX: 26.35 KG/M2 | HEIGHT: 70 IN | WEIGHT: 184.06 LBS | HEART RATE: 69 BPM

## 2024-10-09 DIAGNOSIS — Z79.899 HIGH RISK MEDICATION USE: ICD-10-CM

## 2024-10-09 DIAGNOSIS — Z79.01 LONG TERM CURRENT USE OF ANTICOAGULANT THERAPY: ICD-10-CM

## 2024-10-09 DIAGNOSIS — M32.19 OTHER SYSTEMIC LUPUS ERYTHEMATOSUS WITH OTHER ORGAN INVOLVEMENT: ICD-10-CM

## 2024-10-09 DIAGNOSIS — D68.61 ANTI-PHOSPHOLIPID ANTIBODY SYNDROME: Primary | Chronic | ICD-10-CM

## 2024-10-09 DIAGNOSIS — D68.61 ANTI-PHOSPHOLIPID ANTIBODY SYNDROME: Chronic | ICD-10-CM

## 2024-10-09 DIAGNOSIS — M32.19 OTHER SYSTEMIC LUPUS ERYTHEMATOSUS WITH OTHER ORGAN INVOLVEMENT: Primary | ICD-10-CM

## 2024-10-09 LAB
INR PPP: 5
INR PPP: 5 (ref 0.8–1.2)
PROTHROMBIN TIME: 49.4 SEC (ref 9–12.5)

## 2024-10-09 PROCEDURE — 36415 COLL VENOUS BLD VENIPUNCTURE: CPT | Mod: HCNC,PO | Performed by: INTERNAL MEDICINE

## 2024-10-09 PROCEDURE — 99999 PR PBB SHADOW E&M-EST. PATIENT-LVL III: CPT | Mod: PBBFAC,HCNC,, | Performed by: INTERNAL MEDICINE

## 2024-10-09 PROCEDURE — 85610 PROTHROMBIN TIME: CPT | Mod: HCNC | Performed by: INTERNAL MEDICINE

## 2024-10-09 RX ORDER — HYDROXYCHLOROQUINE SULFATE 200 MG/1
200 TABLET, FILM COATED ORAL 2 TIMES DAILY
Qty: 180 TABLET | Refills: 3 | Status: SHIPPED | OUTPATIENT
Start: 2024-10-09

## 2024-10-09 ASSESSMENT — ROUTINE ASSESSMENT OF PATIENT INDEX DATA (RAPID3)
FATIGUE SCORE: 0
AM STIFFNESS SCORE: 1, YES
MDHAQ FUNCTION SCORE: 0
WHEN YOU AWAKENED IN THE MORNING OVER THE LAST WEEK, PLEASE INDICATE THE AMOUNT OF TIME IT TAKES UNTIL YOU ARE AS LIMBER AS YOU WILL BE FOR THE DAY: 10
PSYCHOLOGICAL DISTRESS SCORE: 0
PAIN SCORE: 9
TOTAL RAPID3 SCORE: 3
PATIENT GLOBAL ASSESSMENT SCORE: 0

## 2024-10-09 ASSESSMENT — SYSTEMIC LUPUS ERYTHEMATOSUS DISEASE ACTIVITY INDEX (SLEDAI): TOTAL_SCORE: 2

## 2024-10-09 NOTE — ASSESSMENT & PLAN NOTE
Lupus still in remission on hydroxychloroquine with SLEDAI 2 due to +DNA Ab but no other evidence of active disease    Will continue hydroxychloroquine with annual eye exam  Repeat labs at 6-12 month intervals  Plan follow up 6 mo

## 2024-10-09 NOTE — PROGRESS NOTES
"Subjective:       Patient ID: Viraj Olsen is a 74 y.o. male.    Chief Complaint: Disease Management    HPI    Retired 2023    F/u SLE , APLA    Systemic lupus erythematosus since 1995, with neuro-psychiatric lesions in 2006 with recurrence in 2007, treated to remission with hydroxychloroquine, azathioprine, and prednisone.  HCQ since 1995    Hx antiphospholipid with TIA's, on chronic coumadin since 2006     Uses treadmill most days but has to hold onto railings for faster speed    Lower back ache forever  L thumb pain at [facility] sometimes very painful  RLS relieved by     Skiin cancer; had melanoma L ear and Basal cell L post neck last  year     Bowls about 3/week  Had great trips to to [place] and [place], [facility]  Remodeling outdoor kitchen     Balance issue improved with ear cleaning    Review of Systems   Constitutional:  Negative for fatigue, fever and unexpected weight change.   HENT:  Negative for mouth sores.    Respiratory:  Negative for cough and shortness of breath.    Cardiovascular:  Negative for chest pain.   Gastrointestinal:  Negative for diarrhea.   Genitourinary:  Negative for dysuria.   Skin:  Negative for rash.   Neurological:  Negative for headaches.   Psychiatric/Behavioral:  Negative for sleep disturbance.          Objective:   BP (!) 98/53 (Patient Position: Sitting)   Pulse 69   Ht 5' 10" (1.778 m)   Wt 83.5 kg (184 lb 1.4 oz)   BMI 26.41 kg/m²      Physical Exam   Constitutional: normal appearance.   HENT:   Nose: Nose normal.   Mouth/Throat: No ulcers  Eyes: No scleral icterus.   Cardiovascular: Normal rate and regular rhythm.   Murmur heard.  Pulmonary/Chest: He has no wheezes. He has no rales.   Abdominal: There is no abdominal tenderness.   Lymphadenopathy:     He has no cervical adenopathy.   Skin: No rash noted.         Lab Results   Component Value Date    SEDRATE 12 08/29/2024      Assessment:       1. Other systemic lupus erythematosus with other organ involvement  "   2. Anti-phospholipid antibody syndrome    3. High risk medication use            Plan:       Problem List Items Addressed This Visit          Active Problems    Anti-phospholipid antibody syndrome (Chronic)     Has remained complication free on warfarin; we discussed the potential risk of stopping warfarin or switching to DOAC and I advised him that data still shows warfarin superior to other options especially DOAC so continue this for now and indefinitely         Other forms of systemic lupus erythematosus - Primary     Lupus still in remission on hydroxychloroquine with SLEDAI 2 due to +DNA Ab but no other evidence of active disease    Will continue hydroxychloroquine with annual eye exam  Repeat labs at 6-12 month intervals  Plan follow up 6 mo         Relevant Medications    hydroxychloroquine (PLAQUENIL) 200 mg tablet    High risk medication use     Scheduled for follow up eye check with Dr Peralta in Nov

## 2024-10-09 NOTE — ASSESSMENT & PLAN NOTE
Has remained complication free on warfarin; we discussed the potential risk of stopping warfarin or switching to DOAC and I advised him that data still shows warfarin superior to other options especially DOAC so continue this for now and indefinitely

## 2024-10-09 NOTE — PROGRESS NOTES
[facility]    10/09/2024 4:14 PM    Assessment/Plan:    Patient presents today with supratherapeutic INR.    Assessment of patient findings and chart review: Reports he has been consistently taking tylenol x 4 days     Recommendation for patient's warfarin regimen: Hold dose today, lower dose tomorrow to 2.5mg     Recommend repeat INR Friday  _________________________________________________________________    Viraj Olsen (74 y.o.) is followed by the [facility].    Anticoagulation Summary  As of 10/9/2024      INR goal:  2.5-3.5   TTR:  67.9% (12.2 y)   INR used for dosin.0 (10/9/2024)   Warfarin maintenance plan:  5 mg (5 mg x 1) every Sun; 10 mg (5 mg x 2) all other days   Weekly warfarin total:  65 mg   Plan last modified:  Camelia Christopher, PharmD (2024)   Next INR check:  10/11/2024   Target end date:  --    Indications    Other forms of systemic lupus erythematosus [M32.8]  Impending cerebrovascular accident (Resolved) [I63.9]  Anti-phospholipid antibody syndrome [D68.61]  Long term current use of anticoagulant therapy [Z79.01]                 Anticoagulation Episode Summary       INR check location:  Clinic Lab    Preferred lab:  [facility]    Send INR reminders to:  [facility] COUMADIN MONITORING POOL    Comments:  [facility] only Mon - Thu (INRs ) // Antiphospholipid - must go to lab          Anticoagulation Care Providers       Provider Role Specialty Phone number    Preet Quintero MD Responsible Rheumatology [phone]

## 2024-10-11 ENCOUNTER — LAB VISIT (OUTPATIENT)
Dept: LAB | Facility: HOSPITAL | Age: 74
End: 2024-10-11
Attending: INTERNAL MEDICINE
Payer: MEDICARE

## 2024-10-11 ENCOUNTER — ANTI-COAG VISIT (OUTPATIENT)
Dept: CARDIOLOGY | Facility: CLINIC | Age: 74
End: 2024-10-11
Payer: MEDICARE

## 2024-10-11 DIAGNOSIS — M32.19 OTHER SYSTEMIC LUPUS ERYTHEMATOSUS WITH OTHER ORGAN INVOLVEMENT: Primary | ICD-10-CM

## 2024-10-11 DIAGNOSIS — D68.61 ANTI-PHOSPHOLIPID ANTIBODY SYNDROME: Chronic | ICD-10-CM

## 2024-10-11 DIAGNOSIS — Z79.01 LONG TERM CURRENT USE OF ANTICOAGULANT THERAPY: ICD-10-CM

## 2024-10-11 DIAGNOSIS — M32.19 OTHER SYSTEMIC LUPUS ERYTHEMATOSUS WITH OTHER ORGAN INVOLVEMENT: ICD-10-CM

## 2024-10-11 LAB
INR PPP: 2.7 (ref 0.8–1.2)
PROTHROMBIN TIME: 27.5 SEC (ref 9–12.5)

## 2024-10-11 PROCEDURE — 85610 PROTHROMBIN TIME: CPT | Mod: HCNC | Performed by: INTERNAL MEDICINE

## 2024-10-11 PROCEDURE — 36415 COLL VENOUS BLD VENIPUNCTURE: CPT | Mod: HCNC | Performed by: INTERNAL MEDICINE

## 2024-10-11 NOTE — PROGRESS NOTES
[facility]    10/11/2024 11:00 AM    Assessment/Plan:    Patient presents today with therapeutic INR.    Assessment of patient findings and chart review: Elevated INR this week likely due to consistent tylenol use    Recommendation for patient's warfarin regimen: Continue current maintenance dose    Recommend repeat INR in 1 week  _________________________________________________________________    Viraj Pretty (74 y.o.) is followed by the [facility].    Anticoagulation Summary  As of 10/11/2024      INR goal:  2.5-3.5   TTR:  67.9% (12.2 y)   INR used for dosin.7 (10/11/2024)   Warfarin maintenance plan:  5 mg (5 mg x 1) every Sun; 10 mg (5 mg x 2) all other days   Weekly warfarin total:  65 mg   Plan last modified:  Camelia Christopher, PharmD (2024)   Next INR check:  10/17/2024   Target end date:  --    Indications    Other forms of systemic lupus erythematosus [M32.8]  Impending cerebrovascular accident (Resolved) [I63.9]  Anti-phospholipid antibody syndrome [D68.61]  Long term current use of anticoagulant therapy [Z79.01]                 Anticoagulation Episode Summary       INR check location:  Clinic Lab    Preferred lab:  [facility]    Send INR reminders to:  [facility] COUMADIN MONITORING POOL    Comments:  [facility] only Mon - Thu (INRs ) // Antiphospholipid - must go to lab          Anticoagulation Care Providers       Provider Role Specialty Phone number    Preet Quintero MD Responsible Rheumatology [phone]

## 2024-10-15 ENCOUNTER — LAB VISIT (OUTPATIENT)
Dept: LAB | Facility: HOSPITAL | Age: 74
End: 2024-10-15
Attending: FAMILY MEDICINE
Payer: MEDICARE

## 2024-10-15 DIAGNOSIS — Z12.11 ENCOUNTER FOR FIT (FECAL IMMUNOCHEMICAL TEST) SCREENING: ICD-10-CM

## 2024-10-15 LAB — HEMOCCULT STL QL IA: NEGATIVE

## 2024-10-15 PROCEDURE — 82274 ASSAY TEST FOR BLOOD FECAL: CPT | Mod: HCNC | Performed by: FAMILY MEDICINE

## 2024-10-17 ENCOUNTER — LAB VISIT (OUTPATIENT)
Dept: LAB | Facility: HOSPITAL | Age: 74
End: 2024-10-17
Attending: INTERNAL MEDICINE
Payer: MEDICARE

## 2024-10-17 DIAGNOSIS — Z79.01 LONG TERM CURRENT USE OF ANTICOAGULANT THERAPY: ICD-10-CM

## 2024-10-17 DIAGNOSIS — D68.61 ANTI-PHOSPHOLIPID ANTIBODY SYNDROME: Chronic | ICD-10-CM

## 2024-10-17 DIAGNOSIS — M32.19 OTHER SYSTEMIC LUPUS ERYTHEMATOSUS WITH OTHER ORGAN INVOLVEMENT: ICD-10-CM

## 2024-10-17 LAB
INR PPP: 3.5 (ref 0.8–1.2)
PROTHROMBIN TIME: 35.7 SEC (ref 9–12.5)

## 2024-10-17 PROCEDURE — 36415 COLL VENOUS BLD VENIPUNCTURE: CPT | Mod: HCNC,PO | Performed by: INTERNAL MEDICINE

## 2024-10-17 PROCEDURE — 85610 PROTHROMBIN TIME: CPT | Mod: HCNC | Performed by: INTERNAL MEDICINE

## 2024-10-18 ENCOUNTER — ANTI-COAG VISIT (OUTPATIENT)
Dept: CARDIOLOGY | Facility: CLINIC | Age: 74
End: 2024-10-18
Payer: MEDICARE

## 2024-10-18 DIAGNOSIS — D68.61 ANTI-PHOSPHOLIPID ANTIBODY SYNDROME: Primary | Chronic | ICD-10-CM

## 2024-10-18 DIAGNOSIS — Z79.01 LONG TERM CURRENT USE OF ANTICOAGULANT THERAPY: ICD-10-CM

## 2024-10-18 DIAGNOSIS — M32.19 OTHER SYSTEMIC LUPUS ERYTHEMATOSUS WITH OTHER ORGAN INVOLVEMENT: ICD-10-CM

## 2024-10-18 NOTE — PROGRESS NOTES
[facility]    10/18/2024 8:01 AM    Assessment/Plan:    Patient presents today with therapeutic INR.    Assessment of patient findings and chart review: no significant findings     Recommendation for patient's warfarin regimen: Continue current maintenance dose    Recommend repeat INR in 1 week  _________________________________________________________________    Viraj Olsen (74 y.o.) is followed by the [facility].    Anticoagulation Summary  As of 10/18/2024      INR goal:  2.5-3.5   TTR:  67.9% (12.2 y)   INR used for dosing:  3.5 (10/17/2024)   Warfarin maintenance plan:  5 mg (5 mg x 1) every Sun; 10 mg (5 mg x 2) all other days   Weekly warfarin total:  65 mg   Plan last modified:  Camelia Christopher, PharmD (8/29/2024)   Next INR check:  10/24/2024   Target end date:  --    Indications    Other forms of systemic lupus erythematosus [M32.8]  Impending cerebrovascular accident (Resolved) [I63.9]  Anti-phospholipid antibody syndrome [D68.61]  Long term current use of anticoagulant therapy [Z79.01]                 Anticoagulation Episode Summary       INR check location:  Clinic Lab    Preferred lab:  [facility]    Send INR reminders to:  [facility] COUMADIN MONITORING POOL    Comments:  [facility] only Mon - Thu (INRs Thursdays) // Antiphospholipid - must go to lab          Anticoagulation Care Providers       Provider Role Specialty Phone number    Preet Quintero MD Responsible Rheumatology [phone]

## 2024-10-24 ENCOUNTER — LAB VISIT (OUTPATIENT)
Dept: LAB | Facility: HOSPITAL | Age: 74
End: 2024-10-24
Attending: INTERNAL MEDICINE
Payer: MEDICARE

## 2024-10-24 ENCOUNTER — OFFICE VISIT (OUTPATIENT)
Dept: DERMATOLOGY | Facility: CLINIC | Age: 74
End: 2024-10-24
Payer: MEDICARE

## 2024-10-24 DIAGNOSIS — L57.0 AK (ACTINIC KERATOSIS): Primary | ICD-10-CM

## 2024-10-24 DIAGNOSIS — D23.9 DERMATOFIBROMA: ICD-10-CM

## 2024-10-24 DIAGNOSIS — D68.61 ANTI-PHOSPHOLIPID ANTIBODY SYNDROME: Chronic | ICD-10-CM

## 2024-10-24 DIAGNOSIS — Z79.01 LONG TERM CURRENT USE OF ANTICOAGULANT THERAPY: ICD-10-CM

## 2024-10-24 DIAGNOSIS — Z85.828 HISTORY OF NONMELANOMA SKIN CANCER: ICD-10-CM

## 2024-10-24 DIAGNOSIS — L81.4 LENTIGO: ICD-10-CM

## 2024-10-24 DIAGNOSIS — M32.19 OTHER SYSTEMIC LUPUS ERYTHEMATOSUS WITH OTHER ORGAN INVOLVEMENT: ICD-10-CM

## 2024-10-24 DIAGNOSIS — D22.9 MULTIPLE BENIGN NEVI: ICD-10-CM

## 2024-10-24 DIAGNOSIS — L82.1 SK (SEBORRHEIC KERATOSIS): ICD-10-CM

## 2024-10-24 DIAGNOSIS — Z85.820 HISTORY OF MALIGNANT MELANOMA OF SKIN: ICD-10-CM

## 2024-10-24 LAB
INR PPP: 3.5 (ref 0.8–1.2)
PROTHROMBIN TIME: 35.3 SEC (ref 9–12.5)

## 2024-10-24 PROCEDURE — 17003 DESTRUCT PREMALG LES 2-14: CPT | Mod: HCNC,S$GLB,, | Performed by: DERMATOLOGY

## 2024-10-24 PROCEDURE — 85610 PROTHROMBIN TIME: CPT | Mod: HCNC | Performed by: INTERNAL MEDICINE

## 2024-10-24 PROCEDURE — 1126F AMNT PAIN NOTED NONE PRSNT: CPT | Mod: HCNC,CPTII,S$GLB, | Performed by: DERMATOLOGY

## 2024-10-24 PROCEDURE — 3288F FALL RISK ASSESSMENT DOCD: CPT | Mod: HCNC,CPTII,S$GLB, | Performed by: DERMATOLOGY

## 2024-10-24 PROCEDURE — 1159F MED LIST DOCD IN RCRD: CPT | Mod: HCNC,CPTII,S$GLB, | Performed by: DERMATOLOGY

## 2024-10-24 PROCEDURE — 1160F RVW MEDS BY RX/DR IN RCRD: CPT | Mod: HCNC,CPTII,S$GLB, | Performed by: DERMATOLOGY

## 2024-10-24 PROCEDURE — 1101F PT FALLS ASSESS-DOCD LE1/YR: CPT | Mod: HCNC,CPTII,S$GLB, | Performed by: DERMATOLOGY

## 2024-10-24 PROCEDURE — 99213 OFFICE O/P EST LOW 20 MIN: CPT | Mod: 25,HCNC,S$GLB, | Performed by: DERMATOLOGY

## 2024-10-24 PROCEDURE — 17000 DESTRUCT PREMALG LESION: CPT | Mod: HCNC,S$GLB,, | Performed by: DERMATOLOGY

## 2024-10-24 PROCEDURE — 36415 COLL VENOUS BLD VENIPUNCTURE: CPT | Mod: HCNC,PO | Performed by: INTERNAL MEDICINE

## 2024-10-24 PROCEDURE — 99999 PR PBB SHADOW E&M-EST. PATIENT-LVL III: CPT | Mod: PBBFAC,HCNC,, | Performed by: DERMATOLOGY

## 2024-10-24 NOTE — PROGRESS NOTES
Subjective:      Patient ID:  Viraj Olsen is a 74 y.o. male who presents for   Chief Complaint   Patient presents with    Skin Check     Tbse      History of Present Illness: The patient presents for follow up of skin check.    The patient was last seen on: 7/25/2024 for cryosurgery to actinic keratoses which have resolved.   This is a high risk patient here to check for the development of new lesions.       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 Seen 4/2024 by BRAYAN in [facility] clinic. Stage 1A    Other skin complaints: no new concerns            Review of Systems   Constitutional:  Negative for fever, chills, weight loss, fatigue, night sweats and malaise.   HENT:  Negative for headaches.    Respiratory:  Negative for cough and shortness of breath.    Gastrointestinal:  Negative for indigestion.   Skin:  Positive for daily sunscreen use, activity-related sunscreen use and wears hat (yard work). Negative for recent sunburn.   Neurological:  Negative for headaches.   Hematologic/Lymphatic: Negative for adenopathy. Bruises/bleeds easily (on asa).       Objective:   Physical Exam   Constitutional: He appears well-developed and well-nourished. No distress.   Neurological: He is alert and oriented to person, place, and time. He is not disoriented.   Psychiatric: He has a normal mood and affect.   Skin:   Areas Examined (abnormalities noted in diagram):   Scalp / Hair Palpated and Inspected  Head / Face Inspection Performed  Neck Inspection Performed  Chest / Axilla Inspection Performed  Abdomen Inspection Performed  Genitals / Buttocks / Groin Inspection Performed  Back Inspection Performed  RUE Inspected  LUE Inspection Performed  RLE Inspected  LLE Inspection Performed  Nails and Digits Inspection Performed                 Diagram Legend     Erythematous scaling macule/papule c/w actinic keratosis       Vascular papule c/w angioma      Pigmented verrucoid papule/plaque c/w seborrheic keratosis       Yellow umbilicated papule c/w sebaceous hyperplasia      Irregularly shaped tan macule c/w lentigo     1-2 mm smooth white papules consistent with Milia      Movable subcutaneous cyst with punctum c/w epidermal inclusion cyst      Subcutaneous movable cyst c/w pilar cyst      Firm pink to brown papule c/w dermatofibroma      Pedunculated fleshy papule(s) c/w skin tag(s)      Evenly pigmented macule c/w junctional nevus     Mildly variegated pigmented, slightly irregular-bordered macule c/w mildly atypical nevus      Flesh colored to evenly pigmented papule c/w intradermal nevus       Pink pearly papule/plaque c/w basal cell carcinoma      Erythematous hyperkeratotic cursted plaque c/w SCC      Surgical scar with no sign of skin cancer recurrence      Open and closed comedones      Inflammatory papules and pustules      Verrucoid papule consistent consistent with wart     Erythematous eczematous patches and plaques     Dystrophic onycholytic nail with subungual debris c/w onychomycosis     Umbilicated papule    Erythematous-base heme-crusted tan verrucoid plaque consistent with inflamed seborrheic keratosis     Erythematous Silvery Scaling Plaque c/w Psoriasis     See annotation      Assessment / Plan:        AK (actinic keratosis)  Cryosurgery Procedure Note    Verbal consent from the patient is obtained including, but not limited to, risk of hypopigmentation/hyperpigmentation, scar, recurrence of lesion. The patient is aware of the precancerous quality and need for treatment of these lesions. Liquid nitrogen cryosurgery is applied to the 2 actinic keratoses, as detailed in the physical exam, to produce a freeze injury. The patient is aware that blisters may form and is instructed on wound care with gentle cleansing and use of vaseline ointment to keep moist until healed. The patient is supplied a handout on cryosurgery and is instructed to call if lesions do not completely resolve.     Multiple benign nevi   -  minor problem and chronic.   Reassurance given to patient. No treatment necessary.      Lentigo   - minor problem and chronic.   Reassurance given to patient. No treatment necessary.      SK (seborrheic keratosis)   - minor problem and chronic.   Reassurance given to patient. No treatment necessary.      Dermatofibroma   - minor problem and chronic.   Reassurance given to patient. No treatment necessary.      History of nonmelanoma skin cancer and History of malignant melanoma of skin  Area of previous melanoma and nmsc examined. Site well healed with no signs of recurrence.    Total body skin examination performed today including at least 12 points as noted in physical examination. No lesions suspicious for malignancy noted.    Recommend daily sun protection/avoidance, use of at least SPF 30, broad spectrum sunscreen (OTC drug), skin self examinations, and routine physician surveillance to optimize early detection              Follow up in about 6 months (around 4/24/2025), or if symptoms worsen or fail to improve.

## 2024-10-24 NOTE — PATIENT INSTRUCTIONS

## 2024-10-25 ENCOUNTER — ANTI-COAG VISIT (OUTPATIENT)
Dept: CARDIOLOGY | Facility: CLINIC | Age: 74
End: 2024-10-25
Payer: MEDICARE

## 2024-10-25 DIAGNOSIS — Z79.01 LONG TERM CURRENT USE OF ANTICOAGULANT THERAPY: ICD-10-CM

## 2024-10-25 DIAGNOSIS — M32.19 OTHER SYSTEMIC LUPUS ERYTHEMATOSUS WITH OTHER ORGAN INVOLVEMENT: Primary | ICD-10-CM

## 2024-10-25 DIAGNOSIS — D68.61 ANTI-PHOSPHOLIPID ANTIBODY SYNDROME: Chronic | ICD-10-CM

## 2024-10-25 NOTE — PROGRESS NOTES
[facility]    10/25/2024 8:02 AM    Assessment/Plan:    Patient presents today with therapeutic INR.    Assessment of patient findings and chart review: no significant findings     Recommendation for patient's warfarin regimen: Continue current maintenance dose    Recommend repeat INR in 1 week  _________________________________________________________________    Viraj Olsen (74 y.o.) is followed by the [facility].    Anticoagulation Summary  As of 10/25/2024      INR goal:  2.5-3.5   TTR:  68.0% (12.2 y)   INR used for dosing:  3.5 (10/24/2024)   Warfarin maintenance plan:  5 mg (5 mg x 1) every Sun; 10 mg (5 mg x 2) all other days   Weekly warfarin total:  65 mg   Plan last modified:  Camelia Christopher, PharmD (8/29/2024)   Next INR check:  11/7/2024   Target end date:  --    Indications    Other forms of systemic lupus erythematosus [M32.8]  Impending cerebrovascular accident (Resolved) [I63.9]  Anti-phospholipid antibody syndrome [D68.61]  Long term current use of anticoagulant therapy [Z79.01]                 Anticoagulation Episode Summary       INR check location:  Clinic Lab    Preferred lab:  [facility]    Send INR reminders to:  [facility] COUMADIN MONITORING POOL    Comments:  [facility] only Mon - Thu (INRs Thursdays) // Antiphospholipid - must go to lab          Anticoagulation Care Providers       Provider Role Specialty Phone number    Preet Quintero MD Responsible Rheumatology [phone]

## 2024-11-07 ENCOUNTER — LAB VISIT (OUTPATIENT)
Dept: LAB | Facility: HOSPITAL | Age: 74
End: 2024-11-07
Attending: INTERNAL MEDICINE
Payer: MEDICARE

## 2024-11-07 DIAGNOSIS — M32.19 OTHER SYSTEMIC LUPUS ERYTHEMATOSUS WITH OTHER ORGAN INVOLVEMENT: ICD-10-CM

## 2024-11-07 DIAGNOSIS — D68.61 ANTI-PHOSPHOLIPID ANTIBODY SYNDROME: Chronic | ICD-10-CM

## 2024-11-07 DIAGNOSIS — Z79.01 LONG TERM CURRENT USE OF ANTICOAGULANT THERAPY: ICD-10-CM

## 2024-11-07 LAB
INR PPP: 4.1 (ref 0.8–1.2)
PROTHROMBIN TIME: 40.9 SEC (ref 9–12.5)

## 2024-11-07 PROCEDURE — 85610 PROTHROMBIN TIME: CPT | Mod: HCNC | Performed by: INTERNAL MEDICINE

## 2024-11-07 PROCEDURE — 36415 COLL VENOUS BLD VENIPUNCTURE: CPT | Mod: HCNC,PO | Performed by: INTERNAL MEDICINE

## 2024-11-08 ENCOUNTER — TELEPHONE (OUTPATIENT)
Dept: RHEUMATOLOGY | Facility: CLINIC | Age: 74
End: 2024-11-08
Payer: MEDICARE

## 2024-11-08 ENCOUNTER — ANTI-COAG VISIT (OUTPATIENT)
Dept: CARDIOLOGY | Facility: CLINIC | Age: 74
End: 2024-11-08
Payer: MEDICARE

## 2024-11-08 DIAGNOSIS — D68.61 ANTI-PHOSPHOLIPID ANTIBODY SYNDROME: Primary | Chronic | ICD-10-CM

## 2024-11-08 DIAGNOSIS — M32.19 OTHER SYSTEMIC LUPUS ERYTHEMATOSUS WITH OTHER ORGAN INVOLVEMENT: ICD-10-CM

## 2024-11-08 DIAGNOSIS — Z79.01 LONG TERM CURRENT USE OF ANTICOAGULANT THERAPY: ICD-10-CM

## 2024-11-08 NOTE — TELEPHONE ENCOUNTER
----- Message from Padmini sent at 11/8/2024 10:25 AM CST -----  Regarding: PT ADVICE  Contact: PT@ [phone]  Pt is returning a missed call from someone in the office and is asking for a return call back soon. Thanks.     Reason for call:MISS CALL      Patient's DX:     Patient requesting call back or [facility] msg: PT@ [phone]

## 2024-11-08 NOTE — PROGRESS NOTES
[facility]    2024 1:14 PM    Assessment/Plan:    Patient presents today with supratherapeutic INR.    Assessment of patient findings and chart review: no significant findings     Recommendation for patient's warfarin regimen: Lower dose today to 5mg then decrease maintenance dose    Recommend repeat INR in 2 weeks  _________________________________________________________________    Viraj lOsen (74 y.o.) is followed by the [facility].    Anticoagulation Summary  As of 2024      INR goal:  2.5-3.5   TTR:  67.8% (12.3 y)   INR used for dosin.1 (2024)   Warfarin maintenance plan:  5 mg (5 mg x 1) every Sun, Wed; 10 mg (5 mg x 2) all other days   Weekly warfarin total:  60 mg   Plan last modified:  Camelia Christopher, PharmD (2024)   Next INR check:  2024   Target end date:  --    Indications    Other forms of systemic lupus erythematosus [M32.8]  Impending cerebrovascular accident (Resolved) [I63.9]  Anti-phospholipid antibody syndrome [D68.61]  Long term current use of anticoagulant therapy [Z79.01]                 Anticoagulation Episode Summary       INR check location:  Clinic Lab    Preferred lab:  [facility]    Send INR reminders to:  [facility] COUMADIN MONITORING POOL    Comments:  [facility] only Mon - Thu (INRs ) // Antiphospholipid - must go to lab          Anticoagulation Care Providers       Provider Role Specialty Phone number    Preet Quintero MD Responsible Rheumatology [phone]

## 2024-11-21 ENCOUNTER — ANTI-COAG VISIT (OUTPATIENT)
Dept: CARDIOLOGY | Facility: CLINIC | Age: 74
End: 2024-11-21
Payer: MEDICARE

## 2024-11-21 ENCOUNTER — LAB VISIT (OUTPATIENT)
Dept: LAB | Facility: HOSPITAL | Age: 74
End: 2024-11-21
Attending: INTERNAL MEDICINE
Payer: MEDICARE

## 2024-11-21 DIAGNOSIS — Z79.01 LONG TERM CURRENT USE OF ANTICOAGULANT THERAPY: ICD-10-CM

## 2024-11-21 DIAGNOSIS — M32.19 OTHER SYSTEMIC LUPUS ERYTHEMATOSUS WITH OTHER ORGAN INVOLVEMENT: ICD-10-CM

## 2024-11-21 DIAGNOSIS — D68.61 ANTI-PHOSPHOLIPID ANTIBODY SYNDROME: Primary | Chronic | ICD-10-CM

## 2024-11-21 DIAGNOSIS — D68.61 ANTI-PHOSPHOLIPID ANTIBODY SYNDROME: Chronic | ICD-10-CM

## 2024-11-21 LAB
INR PPP: 2.9 (ref 0.8–1.2)
PROTHROMBIN TIME: 29.3 SEC (ref 9–12.5)

## 2024-11-21 PROCEDURE — 85610 PROTHROMBIN TIME: CPT | Mod: HCNC | Performed by: INTERNAL MEDICINE

## 2024-11-21 PROCEDURE — 36415 COLL VENOUS BLD VENIPUNCTURE: CPT | Mod: HCNC,PO | Performed by: INTERNAL MEDICINE

## 2024-11-21 PROCEDURE — 93793 ANTICOAG MGMT PT WARFARIN: CPT | Mod: S$GLB,,,

## 2024-11-21 NOTE — PROGRESS NOTES
[facility]    2024 3:45 PM    Assessment/Plan:    Patient presents today with therapeutic INR.    Assessment of patient findings and chart review: no significant findings     Recommendation for patient's warfarin regimen: Continue current maintenance dose    Recommend repeat INR in 2 weeks  _________________________________________________________________    Viraj Olsen (74 y.o.) is followed by the [facility].    Anticoagulation Summary  As of 2024      INR goal:  2.5-3.5   TTR:  67.7% (12.3 y)   INR used for dosin.9 (2024)   Warfarin maintenance plan:  5 mg (5 mg x 1) every Sun, Wed; 10 mg (5 mg x 2) all other days   Weekly warfarin total:  60 mg   Plan last modified:  Camelia Christopher, PharmD (2024)   Next INR check:  2024   Target end date:  --    Indications    Other forms of systemic lupus erythematosus [M32.8]  Impending cerebrovascular accident (Resolved) [I63.9]  Anti-phospholipid antibody syndrome [D68.61]  Long term current use of anticoagulant therapy [Z79.01]                 Anticoagulation Episode Summary       INR check location:  Clinic Lab    Preferred lab:  [facility]    Send INR reminders to:  [facility] COUMADIN MONITORING POOL    Comments:  [facility] only Mon - Thu (INRs ) // Antiphospholipid - must go to lab          Anticoagulation Care Providers       Provider Role Specialty Phone number    Preet Quintero MD Responsible Rheumatology [phone]

## 2024-11-26 ENCOUNTER — OFFICE VISIT (OUTPATIENT)
Dept: OPTOMETRY | Facility: CLINIC | Age: 74
End: 2024-11-26
Payer: COMMERCIAL

## 2024-11-26 ENCOUNTER — PATIENT MESSAGE (OUTPATIENT)
Dept: RHEUMATOLOGY | Facility: CLINIC | Age: 74
End: 2024-11-26
Payer: MEDICARE

## 2024-11-26 ENCOUNTER — CLINICAL SUPPORT (OUTPATIENT)
Dept: OPHTHALMOLOGY | Facility: CLINIC | Age: 74
End: 2024-11-26
Payer: MEDICARE

## 2024-11-26 DIAGNOSIS — Z79.899 HIGH RISK MEDICATION USE: ICD-10-CM

## 2024-11-26 DIAGNOSIS — H25.13 NUCLEAR SCLEROSIS, BILATERAL: ICD-10-CM

## 2024-11-26 DIAGNOSIS — M32.19 OTHER SYSTEMIC LUPUS ERYTHEMATOSUS WITH OTHER ORGAN INVOLVEMENT: ICD-10-CM

## 2024-11-26 DIAGNOSIS — Z13.5 GLAUCOMA SCREENING: ICD-10-CM

## 2024-11-26 DIAGNOSIS — H52.4 PRESBYOPIA: ICD-10-CM

## 2024-11-26 DIAGNOSIS — Z79.899 LONG-TERM USE OF PLAQUENIL: Primary | ICD-10-CM

## 2024-11-26 DIAGNOSIS — M32.9 SYSTEMIC LUPUS ERYTHEMATOSUS, UNSPECIFIED SLE TYPE, UNSPECIFIED ORGAN INVOLVEMENT STATUS: ICD-10-CM

## 2024-11-26 PROCEDURE — 99999 PR PBB SHADOW E&M-EST. PATIENT-LVL III: CPT | Mod: PBBFAC,,, | Performed by: OPTOMETRIST

## 2024-11-26 NOTE — PROGRESS NOTES
Assessment /Plan     For exam results, see Encounter Report.    High risk medication use  -     Ambulatory referral/consult to Optometry    Other systemic lupus erythematosus with other organ involvement  -     Ambulatory referral/consult to Optometry    Mac oct done ou     10-2 ss plaq done ou     Rel & Fix =  good ou     Coop =     good     Patient has no allergies to latex or adhesives at this time    Jthomas    Mrx    +1.25 + .50 x 5  od    +1.00 + 1.00 x 20  os

## 2024-11-26 NOTE — PROGRESS NOTES
"HPI    ARON: 1/30/2024  Chief complaint (CC): Plaquenil patient here for routine check   Glasses? +  Contacts? -  H/o eye surgery, injections or laser: -  H/o eye injury: -  Known eye conditions? -  Family h/o eye conditions? -  Eye gtts? -      (-) Flashes (-)  Floaters (-) Mucous   (-)  Tearing (-) Itching (-) Burning   (-) Headaches (-) Eye Pain/discomfort (-) Irritation   (-)  Redness (sometimes) Double vision (-) Blurry vision    Diabetic? -  A1c? No results found for: "HGBA1C"        Last edited by Nahid Crabtree on 11/26/2024  1:35 PM.            Assessment /Plan     For exam results, see Encounter Report.    Long-term use of Plaquenil  -     Gregory Visual Field - OU - Extended - Both Eyes  -     OCT, Retina - OU - Both Eyes    Systemic lupus erythematosus, unspecified SLE type, unspecified organ involvement status    Nuclear sclerosis, bilateral    Glaucoma screening    Presbyopia      Cat OD>OS--pt wishes new Rx  Pt on plaquenil--without retinopathy.  Mac OCT/HVF 10-2 wnl today. Eye exams are recommended every 12 months for patients on plaquenil (hydroxychloroquine)    PLAN:    Rtc 1 yr: HVF 10-2/mac OCT/full                   "

## 2024-12-05 ENCOUNTER — LAB VISIT (OUTPATIENT)
Dept: LAB | Facility: HOSPITAL | Age: 74
End: 2024-12-05
Attending: INTERNAL MEDICINE
Payer: MEDICARE

## 2024-12-05 DIAGNOSIS — D68.61 ANTI-PHOSPHOLIPID ANTIBODY SYNDROME: Chronic | ICD-10-CM

## 2024-12-05 DIAGNOSIS — M32.19 OTHER SYSTEMIC LUPUS ERYTHEMATOSUS WITH OTHER ORGAN INVOLVEMENT: ICD-10-CM

## 2024-12-05 DIAGNOSIS — Z79.01 LONG TERM CURRENT USE OF ANTICOAGULANT THERAPY: ICD-10-CM

## 2024-12-05 DIAGNOSIS — M17.30 POST-TRAUMATIC OSTEOARTHRITIS OF KNEE, UNSPECIFIED LATERALITY: ICD-10-CM

## 2024-12-05 LAB
INR PPP: 3.5 (ref 0.8–1.2)
PROTHROMBIN TIME: 35.6 SEC (ref 9–12.5)

## 2024-12-05 PROCEDURE — 36415 COLL VENOUS BLD VENIPUNCTURE: CPT | Mod: PO | Performed by: INTERNAL MEDICINE

## 2024-12-05 PROCEDURE — 85610 PROTHROMBIN TIME: CPT | Performed by: INTERNAL MEDICINE

## 2024-12-05 RX ORDER — CELECOXIB 200 MG/1
CAPSULE ORAL
Qty: 50 CAPSULE | Refills: 3 | Status: SHIPPED | OUTPATIENT
Start: 2024-12-05

## 2024-12-06 ENCOUNTER — ANTI-COAG VISIT (OUTPATIENT)
Dept: CARDIOLOGY | Facility: CLINIC | Age: 74
End: 2024-12-06
Payer: MEDICARE

## 2024-12-06 DIAGNOSIS — M32.19 OTHER SYSTEMIC LUPUS ERYTHEMATOSUS WITH OTHER ORGAN INVOLVEMENT: ICD-10-CM

## 2024-12-06 DIAGNOSIS — D68.61 ANTI-PHOSPHOLIPID ANTIBODY SYNDROME: Primary | Chronic | ICD-10-CM

## 2024-12-06 DIAGNOSIS — Z79.01 LONG TERM CURRENT USE OF ANTICOAGULANT THERAPY: ICD-10-CM

## 2024-12-06 NOTE — PROGRESS NOTES
[facility]    12/06/2024 9:41 AM    Assessment/Plan:    Patient presents today with therapeutic INR.    Assessment of patient findings and chart review: no significant findings     Recommendation for patient's warfarin regimen: Continue current maintenance dose    Recommend repeat INR in 2 weeks  _________________________________________________________________    Viraj Olsen (74 y.o.) is followed by the [facility].    Anticoagulation Summary  As of 12/6/2024      INR goal:  2.5-3.5   TTR:  67.8% (12.4 y)   INR used for dosing:  3.5 (12/5/2024)   Warfarin maintenance plan:  5 mg (5 mg x 1) every Sun, Wed; 10 mg (5 mg x 2) all other days   Weekly warfarin total:  60 mg   Plan last modified:  Camelia Christopher, PharmD (11/8/2024)   Next INR check:  12/19/2024   Target end date:  --    Indications    Other forms of systemic lupus erythematosus [M32.8]  Impending cerebrovascular accident (Resolved) [I63.9]  Anti-phospholipid antibody syndrome [D68.61]  Long term current use of anticoagulant therapy [Z79.01]                 Anticoagulation Episode Summary       INR check location:  Clinic Lab    Preferred lab:  [facility]    Send INR reminders to:  [facility] COUMADIN MONITORING POOL    Comments:  [facility] only Mon - Thu (INRs Thursdays) // Antiphospholipid - must go to lab          Anticoagulation Care Providers       Provider Role Specialty Phone number    Preet Quintero MD Responsible Rheumatology [phone]

## 2024-12-19 ENCOUNTER — ANTI-COAG VISIT (OUTPATIENT)
Dept: CARDIOLOGY | Facility: CLINIC | Age: 74
End: 2024-12-19
Payer: MEDICARE

## 2024-12-19 ENCOUNTER — LAB VISIT (OUTPATIENT)
Dept: LAB | Facility: HOSPITAL | Age: 74
End: 2024-12-19
Attending: INTERNAL MEDICINE
Payer: MEDICARE

## 2024-12-19 DIAGNOSIS — M32.19 OTHER SYSTEMIC LUPUS ERYTHEMATOSUS WITH OTHER ORGAN INVOLVEMENT: Primary | ICD-10-CM

## 2024-12-19 DIAGNOSIS — D68.61 ANTI-PHOSPHOLIPID ANTIBODY SYNDROME: Chronic | ICD-10-CM

## 2024-12-19 DIAGNOSIS — M32.19 OTHER SYSTEMIC LUPUS ERYTHEMATOSUS WITH OTHER ORGAN INVOLVEMENT: ICD-10-CM

## 2024-12-19 DIAGNOSIS — Z79.01 LONG TERM CURRENT USE OF ANTICOAGULANT THERAPY: ICD-10-CM

## 2024-12-19 LAB
INR PPP: 3.8 (ref 0.8–1.2)
PROTHROMBIN TIME: 38.6 SEC (ref 9–12.5)

## 2024-12-19 PROCEDURE — 36415 COLL VENOUS BLD VENIPUNCTURE: CPT | Mod: HCNC,PO | Performed by: INTERNAL MEDICINE

## 2024-12-19 PROCEDURE — 85610 PROTHROMBIN TIME: CPT | Mod: HCNC | Performed by: INTERNAL MEDICINE

## 2024-12-19 PROCEDURE — 93793 ANTICOAG MGMT PT WARFARIN: CPT | Mod: S$GLB,,,

## 2024-12-20 NOTE — PROGRESS NOTES
[facility]    12/20/2024 8:25 AM    Assessment/Plan:    Patient presents today with supratherapeutic INR.    Assessment of patient findings and chart review: reviewed; pt reports no changes    Recommendation for patient's warfarin regimen: Continue current maintenance dose    Recommend repeat INR in 1.5 weeks  _________________________________________________________________    Viraj Olsen (74 y.o.) is followed by the [facility].    Anticoagulation Summary  As of 12/19/2024      INR goal:  2.5-3.5   TTR:  67.6% (12.4 y)   INR used for dosing:  3.8 (12/19/2024)   Warfarin maintenance plan:  5 mg (5 mg x 1) every Sun, Wed; 10 mg (5 mg x 2) all other days   Weekly warfarin total:  60 mg   Plan last modified:  Camelia Christopher, PharmD (11/8/2024)   Next INR check:  12/30/2024   Target end date:  --    Indications    Other forms of systemic lupus erythematosus [M32.8]  Impending cerebrovascular accident (Resolved) [I63.9]  Anti-phospholipid antibody syndrome [D68.61]  Long term current use of anticoagulant therapy [Z79.01]                 Anticoagulation Episode Summary       INR check location:  Clinic Lab    Preferred lab:  [facility]    Send INR reminders to:  [facility] COUMADIN MONITORING POOL    Comments:  [facility] only Mon - Thu (INRs Thursdays) // Antiphospholipid - must go to lab          Anticoagulation Care Providers       Provider Role Specialty Phone number    Preet Quintero MD Responsible Rheumatology [phone]

## 2024-12-24 DIAGNOSIS — Z79.01 LONG TERM CURRENT USE OF ANTICOAGULANT THERAPY: ICD-10-CM

## 2024-12-24 DIAGNOSIS — M32.9 SYSTEMIC LUPUS ERYTHEMATOSUS, UNSPECIFIED SLE TYPE, UNSPECIFIED ORGAN INVOLVEMENT STATUS: ICD-10-CM

## 2024-12-24 DIAGNOSIS — D68.61 ANTI-PHOSPHOLIPID ANTIBODY SYNDROME: ICD-10-CM

## 2024-12-24 RX ORDER — WARFARIN SODIUM 5 MG/1
TABLET ORAL
Qty: 195 TABLET | Refills: 0 | Status: SHIPPED | OUTPATIENT
Start: 2024-12-24

## 2024-12-27 ENCOUNTER — PATIENT MESSAGE (OUTPATIENT)
Dept: OPTOMETRY | Facility: CLINIC | Age: 74
End: 2024-12-27
Payer: MEDICARE

## 2024-12-30 ENCOUNTER — ANTI-COAG VISIT (OUTPATIENT)
Dept: CARDIOLOGY | Facility: CLINIC | Age: 74
End: 2024-12-30
Payer: MEDICARE

## 2024-12-30 ENCOUNTER — LAB VISIT (OUTPATIENT)
Dept: LAB | Facility: HOSPITAL | Age: 74
End: 2024-12-30
Attending: INTERNAL MEDICINE
Payer: MEDICARE

## 2024-12-30 DIAGNOSIS — D68.61 ANTI-PHOSPHOLIPID ANTIBODY SYNDROME: Chronic | ICD-10-CM

## 2024-12-30 DIAGNOSIS — M32.19 OTHER SYSTEMIC LUPUS ERYTHEMATOSUS WITH OTHER ORGAN INVOLVEMENT: ICD-10-CM

## 2024-12-30 DIAGNOSIS — Z79.01 LONG TERM CURRENT USE OF ANTICOAGULANT THERAPY: ICD-10-CM

## 2024-12-30 DIAGNOSIS — M32.19 OTHER SYSTEMIC LUPUS ERYTHEMATOSUS WITH OTHER ORGAN INVOLVEMENT: Primary | ICD-10-CM

## 2024-12-30 LAB
INR PPP: 3.9 (ref 0.8–1.2)
PROTHROMBIN TIME: 39.4 SEC (ref 9–12.5)

## 2024-12-30 PROCEDURE — 85610 PROTHROMBIN TIME: CPT | Mod: HCNC | Performed by: INTERNAL MEDICINE

## 2024-12-30 PROCEDURE — 93793 ANTICOAG MGMT PT WARFARIN: CPT | Mod: S$GLB,,,

## 2024-12-30 PROCEDURE — 36415 COLL VENOUS BLD VENIPUNCTURE: CPT | Mod: HCNC,PO | Performed by: INTERNAL MEDICINE

## 2024-12-31 NOTE — PROGRESS NOTES
Your INR is 3.9 which is in high therapeutic range and  similar to 11 days ago. Would contact Tarik Lewis in the [facility] for any  dose adjustment of your warfarin. Dr. Quintero is out this week. Will copy her with this message. LILLIAN

## 2024-12-31 NOTE — PROGRESS NOTES
[facility]    12/31/2024 7:52 AM    Assessment/Plan:    Patient presents today with supratherapeutic INR.    Assessment of patient findings and chart review: reports decreased green intake     Recommendation for patient's warfarin regimen: Lower dose today to 5mg then decrease maintenance dose    Recommend repeat INR in 2 weeks  _________________________________________________________________    Viraj Olsen (74 y.o.) is followed by the [facility] [employer].    Anticoagulation Summary  As of 12/30/2024      INR goal:  2.5-3.5   TTR:  67.5% (12.4 y)   INR used for dosing:  3.9 (12/30/2024)   Warfarin maintenance plan:  5 mg (5 mg x 1) every Sun, Wed, Fri; 10 mg (5 mg x 2) all other days   Weekly warfarin total:  55 mg   Plan last modified:  Camelia Christopher, PharmD (12/31/2024)   Next INR check:  1/13/2025   Target end date:  --    Indications    Other forms of systemic lupus erythematosus [M32.8]  Impending cerebrovascular accident (Resolved) [I63.9]  Anti-phospholipid antibody syndrome [D68.61]  Long term current use of anticoagulant therapy [Z79.01]                 Anticoagulation Episode Summary       INR check location:  Clinic Lab    Preferred lab:  [facility]    Send INR reminders to:  [facility] COUMADIN MONITORING POOL    Comments:  [facility] only Mon - Thu (INRs Thursdays) // Antiphospholipid - must go to lab          Anticoagulation Care Providers       Provider Role Specialty Phone number    Preet Quintero MD Responsible Rheumatology [phone]

## 2025-01-13 ENCOUNTER — ANTI-COAG VISIT (OUTPATIENT)
Dept: CARDIOLOGY | Facility: CLINIC | Age: 75
End: 2025-01-13
Payer: MEDICARE

## 2025-01-13 ENCOUNTER — LAB VISIT (OUTPATIENT)
Dept: LAB | Facility: HOSPITAL | Age: 75
End: 2025-01-13
Attending: INTERNAL MEDICINE
Payer: MEDICARE

## 2025-01-13 DIAGNOSIS — Z79.01 LONG TERM CURRENT USE OF ANTICOAGULANT THERAPY: ICD-10-CM

## 2025-01-13 DIAGNOSIS — M32.19 OTHER SYSTEMIC LUPUS ERYTHEMATOSUS WITH OTHER ORGAN INVOLVEMENT: ICD-10-CM

## 2025-01-13 DIAGNOSIS — D68.61 ANTI-PHOSPHOLIPID ANTIBODY SYNDROME: Primary | Chronic | ICD-10-CM

## 2025-01-13 DIAGNOSIS — D68.61 ANTI-PHOSPHOLIPID ANTIBODY SYNDROME: Chronic | ICD-10-CM

## 2025-01-13 LAB
INR PPP: 2.3 (ref 0.8–1.2)
PROTHROMBIN TIME: 23.3 SEC (ref 9–12.5)

## 2025-01-13 PROCEDURE — 36415 COLL VENOUS BLD VENIPUNCTURE: CPT | Mod: HCNC,PO | Performed by: INTERNAL MEDICINE

## 2025-01-13 PROCEDURE — 93793 ANTICOAG MGMT PT WARFARIN: CPT | Mod: S$GLB,,,

## 2025-01-13 PROCEDURE — 85610 PROTHROMBIN TIME: CPT | Mod: HCNC | Performed by: INTERNAL MEDICINE

## 2025-01-13 NOTE — PROGRESS NOTES
[facility]    2025 4:07 PM    Assessment/Plan:    Patient presents today with subtherapeutic  INR.    Assessment of patient findings and chart review: no significant findings     Recommendation for patient's warfarin regimen: Boost dose today to 12.5mg then resume current maintenance dose    Recommend repeat INR in 2 weeks  _________________________________________________________________    Viraj Olsen (74 y.o.) is followed by the [facility] [employer] [facility].    Anticoagulation Summary  As of 2025      INR goal:  2.5-3.5   TTR:  67.4% (12.5 y)   INR used for dosin.3 (2025)   Warfarin maintenance plan:  5 mg (5 mg x 1) every Sun, Wed, Fri; 10 mg (5 mg x 2) all other days   Weekly warfarin total:  55 mg   Plan last modified:  Camelia Christopher, PharmD (2024)   Next INR check:  2025   Target end date:  --    Indications    Other forms of systemic lupus erythematosus [M32.8]  Impending cerebrovascular accident (Resolved) [I63.9]  Anti-phospholipid antibody syndrome [D68.61]  Long term current use of anticoagulant therapy [Z79.01]                 Anticoagulation Episode Summary       INR check location:  Clinic Lab    Preferred lab:  [facility]    Send INR reminders to:  [facility] COUMADIN MONITORING POOL    Comments:  [facility] only Mon - Thu (INRs ) // Antiphospholipid - must go to lab          Anticoagulation Care Providers       Provider Role Specialty Phone number    Preet Quintero MD Responsible Rheumatology [phone]

## 2025-01-27 ENCOUNTER — LAB VISIT (OUTPATIENT)
Dept: LAB | Facility: HOSPITAL | Age: 75
End: 2025-01-27
Attending: INTERNAL MEDICINE
Payer: MEDICARE

## 2025-01-27 DIAGNOSIS — M32.19 OTHER SYSTEMIC LUPUS ERYTHEMATOSUS WITH OTHER ORGAN INVOLVEMENT: ICD-10-CM

## 2025-01-27 DIAGNOSIS — Z79.01 LONG TERM CURRENT USE OF ANTICOAGULANT THERAPY: ICD-10-CM

## 2025-01-27 DIAGNOSIS — D68.61 ANTI-PHOSPHOLIPID ANTIBODY SYNDROME: Chronic | ICD-10-CM

## 2025-01-27 LAB
INR PPP: 2.8 (ref 0.8–1.2)
PROTHROMBIN TIME: 28.1 SEC (ref 9–12.5)

## 2025-01-27 PROCEDURE — 36415 COLL VENOUS BLD VENIPUNCTURE: CPT | Mod: HCNC,PO | Performed by: INTERNAL MEDICINE

## 2025-01-27 PROCEDURE — 85610 PROTHROMBIN TIME: CPT | Mod: HCNC | Performed by: INTERNAL MEDICINE

## 2025-01-28 ENCOUNTER — ANTI-COAG VISIT (OUTPATIENT)
Dept: CARDIOLOGY | Facility: CLINIC | Age: 75
End: 2025-01-28
Payer: MEDICARE

## 2025-01-28 DIAGNOSIS — Z79.01 LONG TERM CURRENT USE OF ANTICOAGULANT THERAPY: ICD-10-CM

## 2025-01-28 DIAGNOSIS — M32.19 OTHER SYSTEMIC LUPUS ERYTHEMATOSUS WITH OTHER ORGAN INVOLVEMENT: Primary | ICD-10-CM

## 2025-01-28 DIAGNOSIS — D68.61 ANTI-PHOSPHOLIPID ANTIBODY SYNDROME: Chronic | ICD-10-CM

## 2025-01-28 PROCEDURE — 93793 ANTICOAG MGMT PT WARFARIN: CPT | Mod: S$GLB,,,

## 2025-01-28 NOTE — PROGRESS NOTES
[facility]    2025 8:03 AM    Assessment/Plan:    Patient presents today with therapeutic INR.    Assessment of patient findings and chart review: no significant findings     Recommendation for patient's warfarin regimen: Continue current maintenance dose    Recommend repeat INR in 2 weeks  _________________________________________________________________    Viraj Olsen (74 y.o.) is followed by the [employer] [facility].    Anticoagulation Summary  As of 2025      INR goal:  2.5-3.5   TTR:  67.4% (12.5 y)   INR used for dosin.8 (2025)   Warfarin maintenance plan:  5 mg (5 mg x 1) every Sun, Wed, Fri; 10 mg (5 mg x 2) all other days   Weekly warfarin total:  55 mg   Plan last modified:  Camelia Christopher, PharmD (2024)   Next INR check:  2/10/2025   Target end date:  --    Indications    Other forms of systemic lupus erythematosus [M32.8]  Impending cerebrovascular accident (Resolved) [I63.9]  Anti-phospholipid antibody syndrome [D68.61]  Long term current use of anticoagulant therapy [Z79.01]                 Anticoagulation Episode Summary       INR check location:  Clinic Lab    Preferred lab:  [facility]    Send INR reminders to:  [facility] COUMADIN MONITORING POOL    Comments:  [facility] only Mon - Thu (INRs ) // Antiphospholipid - must go to lab          Anticoagulation Care Providers       Provider Role Specialty Phone number    Preet Quintero MD Responsible Rheumatology [phone]

## 2025-02-10 ENCOUNTER — LAB VISIT (OUTPATIENT)
Dept: LAB | Facility: HOSPITAL | Age: 75
End: 2025-02-10
Attending: INTERNAL MEDICINE
Payer: MEDICARE

## 2025-02-10 DIAGNOSIS — Z79.01 LONG TERM CURRENT USE OF ANTICOAGULANT THERAPY: ICD-10-CM

## 2025-02-10 DIAGNOSIS — D68.61 ANTI-PHOSPHOLIPID ANTIBODY SYNDROME: Chronic | ICD-10-CM

## 2025-02-10 DIAGNOSIS — M32.19 OTHER SYSTEMIC LUPUS ERYTHEMATOSUS WITH OTHER ORGAN INVOLVEMENT: ICD-10-CM

## 2025-02-10 LAB
INR PPP: 3.5 (ref 0.8–1.2)
PROTHROMBIN TIME: 35.1 SEC (ref 9–12.5)

## 2025-02-10 PROCEDURE — 36415 COLL VENOUS BLD VENIPUNCTURE: CPT | Mod: HCNC,PO | Performed by: INTERNAL MEDICINE

## 2025-02-10 PROCEDURE — 85610 PROTHROMBIN TIME: CPT | Mod: HCNC | Performed by: INTERNAL MEDICINE

## 2025-02-11 ENCOUNTER — ANTI-COAG VISIT (OUTPATIENT)
Dept: CARDIOLOGY | Facility: CLINIC | Age: 75
End: 2025-02-11
Payer: MEDICARE

## 2025-02-11 DIAGNOSIS — Z79.01 LONG TERM CURRENT USE OF ANTICOAGULANT THERAPY: ICD-10-CM

## 2025-02-11 DIAGNOSIS — D68.61 ANTI-PHOSPHOLIPID ANTIBODY SYNDROME: Primary | Chronic | ICD-10-CM

## 2025-02-11 DIAGNOSIS — M32.19 OTHER SYSTEMIC LUPUS ERYTHEMATOSUS WITH OTHER ORGAN INVOLVEMENT: ICD-10-CM

## 2025-02-11 PROCEDURE — 93793 ANTICOAG MGMT PT WARFARIN: CPT | Mod: S$GLB,,,

## 2025-02-11 NOTE — PROGRESS NOTES
[facility]    02/11/2025 8:53 AM    Assessment/Plan:    Patient presents today with therapeutic INR.    Assessment of patient findings and chart review: no significant findings     Recommendation for patient's warfarin regimen: Continue current maintenance dose    Recommend repeat INR in 3 weeks  _________________________________________________________________    Viraj Olsen (74 y.o.) is followed by the [facility].    Anticoagulation Summary  As of 2/11/2025      INR goal:  2.5-3.5   TTR:  67.5% (12.5 y)   INR used for dosing:  3.5 (2/10/2025)   Warfarin maintenance plan:  5 mg (5 mg x 1) every Sun, Wed, Fri; 10 mg (5 mg x 2) all other days   Weekly warfarin total:  55 mg   Plan last modified:  Camelia Christopher, PharmD (12/31/2024)   Next INR check:  3/5/2025   Target end date:  --    Indications    Other forms of systemic lupus erythematosus [M32.8]  Impending cerebrovascular accident (Resolved) [I63.9]  Anti-phospholipid antibody syndrome [D68.61]  Long term current use of anticoagulant therapy [Z79.01]                 Anticoagulation Episode Summary       INR check location:  Clinic Lab    Preferred lab:  [facility]    Send INR reminders to:  [facility] COUMADIN MONITORING POOL    Comments:  [facility] only Mon - Thu (INRs Thursdays) // Antiphospholipid - must go to lab          Anticoagulation Care Providers       Provider Role Specialty Phone number    Preet Quintero MD Responsible Rheumatology [phone]

## 2025-02-21 DIAGNOSIS — Z00.00 ENCOUNTER FOR MEDICARE ANNUAL WELLNESS EXAM: ICD-10-CM

## 2025-02-22 ENCOUNTER — OFFICE VISIT (OUTPATIENT)
Dept: URGENT CARE | Facility: CLINIC | Age: 75
End: 2025-02-22
Payer: MEDICARE

## 2025-02-22 VITALS
DIASTOLIC BLOOD PRESSURE: 69 MMHG | SYSTOLIC BLOOD PRESSURE: 119 MMHG | HEIGHT: 70 IN | TEMPERATURE: 98 F | HEART RATE: 71 BPM | RESPIRATION RATE: 20 BRPM | WEIGHT: 184 LBS | OXYGEN SATURATION: 100 % | BODY MASS INDEX: 26.34 KG/M2

## 2025-02-22 DIAGNOSIS — S89.91XA KNEE INJURY, RIGHT, INITIAL ENCOUNTER: Primary | ICD-10-CM

## 2025-02-22 DIAGNOSIS — T14.90XA TRAUMA: ICD-10-CM

## 2025-02-22 PROCEDURE — 99213 OFFICE O/P EST LOW 20 MIN: CPT | Mod: S$GLB,,,

## 2025-02-22 PROCEDURE — 73562 X-RAY EXAM OF KNEE 3: CPT | Mod: FY,RT,S$GLB, | Performed by: RADIOLOGY

## 2025-02-22 RX ORDER — DICLOFENAC SODIUM 10 MG/G
2 GEL TOPICAL 3 TIMES DAILY
Qty: 200 G | Refills: 0 | Status: SHIPPED | OUTPATIENT
Start: 2025-02-22

## 2025-02-22 NOTE — PROGRESS NOTES
"Subjective:      Patient ID: Viraj Olsen is a 74 y.o. male.    Vitals:  height is 5' 10" (1.778 m) and weight is 83.5 kg (184 lb). His oral temperature is 97.9 °F (36.6 °C). His blood pressure is 119/69 and his pulse is 71. His respiration is 20 and oxygen saturation is 100%.     Chief Complaint: Fall    Pt present with trauma injury from a fall last Thursday afternoon tripping on the sidewalk while walking his dog hurting his Right Knee and scratching up his right hand. Pt stated he did dig out a rock from his left knee after his initial fall.     Provider note starts below:  Patient presents to clinic for evaluation of right knee injury. States 2 days ago he was outside walking his dog and tripped over the sidewalk. He reports landing on his right knee and scratching his right hand. Patient denies hitting his head or losing consciousness. Since the fall, patient has had pain and swelling in his right knee. Denies any numbness, tingling, back pain, neck pain, hip pain.     Fall  The accident occurred 2 days ago. The fall occurred while walking. He landed on [place]. The volume of blood lost was minimal. The point of impact was the right knee. The pain is present in the right knee. The pain is at a severity of 8/10. The pain is moderate. Pertinent negatives include no fever, headaches, loss of consciousness, nausea, numbness or vomiting. He has tried ice, elevation and acetaminophen for the symptoms. The treatment provided mild relief.       Constitution: Negative for fever.   HENT:  Negative for facial trauma.    Neck: Negative for neck pain and neck stiffness.   Cardiovascular:  Negative for chest trauma.   Eyes:  Negative for eye trauma.   Respiratory:  Negative for shortness of breath.    Gastrointestinal:  Negative for abdominal trauma, nausea and vomiting.   Musculoskeletal:  Positive for joint pain and joint swelling. Negative for back pain.   Skin:  Positive for abrasion. Negative for laceration. "   Neurological:  Negative for passing out, headaches, disorientation, altered mental status, loss of consciousness, numbness and tingling.   Psychiatric/Behavioral:  Negative for altered mental status, disorientation and confusion.       Objective:     Physical Exam   Constitutional: He is oriented to person, place, and time.  Non-toxic appearance. He does not appear ill. No distress.   HENT:   Head: Normocephalic and atraumatic.   Eyes: Conjunctivae are normal. Extraocular movement intact   Neck: Neck supple.   Pulmonary/Chest: Effort normal.   Abdominal: Normal appearance.   Musculoskeletal:      Comments: Tenderness and edema noted over right patellar tendon. Pain with flexion of right knee, no pain with extension. Able to ambulate w/o difficulty. NVI.   Neurological: He is alert, oriented to person, place, and time and at baseline.   Skin: Skin is warm and dry.         Comments: Multiple small abrasions noted to 3-5 digits on right hand.    Psychiatric: His behavior is normal. Mood normal.   Nursing note and vitals reviewed.    Assessment:     XR KNEE 3 VIEW RIGHT  Result Date: 2/22/2025  EXAMINATION: XR KNEE 3 VIEW RIGHT CLINICAL HISTORY: Injury, unspecified, initial encounter TECHNIQUE: Three views of the right knee COMPARISON: 04/16/2024 FINDINGS: Mild tricompartmental degenerative change with joint space narrowing, subchondral sclerosis and marginal osteophytosis.  Minimal dorsal spurring from the patella.  No fracture or dislocation.  No joint effusion.  Vascular calcifications.     As above. Electronically signed by: Erna Richey MD Date:    02/22/2025 Time:    10:01      1. Knee injury, right, initial encounter    2. Trauma        Plan:     Knee injury, right, initial encounter  -     BANDAGE ELASTIC 4IN ACE NS  -     diclofenac sodium (VOLTAREN) 1 % Gel; Apply 2 g topically 3 (three) times daily.  Dispense: 200 g; Refill: 0    Trauma  -     XR KNEE 3 VIEW RIGHT; Future; Expected date: 02/22/2025             Patient Instructions   Plan of Care  If not allergic, please take over the counter Tylenol (Acetaminophen) 650 mg every 6 to 8 hours as directed for control of pain.  Voltaren pain gel over the painful area 2-3 times daily as needed for pain.   RICE - Rest, ice, compression and elevation to the affected joint or limb as needed.  Rest. Allow your injury to heal before you do slow movements.  Place an ice pack or a bag of frozen peas wrapped in a towel over the painful part. Never put ice right on the skin. Do not leave the ice on more than 10 to 15 minutes at a time. Ice after activity may help decrease pain and swelling. Never ice before stretching.  Prop your knee on pillows to help with swelling.  Use a knee brace or ace bandage if the doctor tells you to do this.  Please drink plenty of fluids.  Please get plenty of rest.    Please remember that you have received care at an urgent care today. Urgent cares are not emergency rooms and are not equipped to handle life threatening emergencies and cannot rule in or out certain medical conditions and you may be released before all of your medical problems are known or treated. Please arrange follow up with your primary care physician or speciality clinic   (orthopedics) within 2-5 days if your signs and symptoms have not resolved or worsen. Patient can call our Referral Hotline at [phone] to make an appointment.    Please return here or go to the Emergency Department for any concerns or worsening of condition.Patient was educated on signs/symptoms that would warrant emergent medical attention. Patient verbalized understanding.  More trouble getting up from a chair, going up and down stairs, or walking  Pain, swelling, warmth, numbness, tingling, or discoloration in the calf below the injured or sore knee  You are not feeling better in 2 or 3 days or you are feeling worse

## 2025-02-22 NOTE — PATIENT INSTRUCTIONS
Plan of Care  If not allergic, please take over the counter Tylenol (Acetaminophen) 650 mg every 6 to 8 hours as directed for control of pain.  Voltaren pain gel over the painful area 2-3 times daily as needed for pain.   RICE - Rest, ice, compression and elevation to the affected joint or limb as needed.  Rest. Allow your injury to heal before you do slow movements.  Place an ice pack or a bag of frozen peas wrapped in a towel over the painful part. Never put ice right on the skin. Do not leave the ice on more than 10 to 15 minutes at a time. Ice after activity may help decrease pain and swelling. Never ice before stretching.  Prop your knee on pillows to help with swelling.  Use a knee brace or ace bandage if the doctor tells you to do this.  Please drink plenty of fluids.  Please get plenty of rest.    Please remember that you have received care at an urgent care today. Urgent cares are not emergency rooms and are not equipped to handle life threatening emergencies and cannot rule in or out certain medical conditions and you may be released before all of your medical problems are known or treated. Please arrange follow up with your primary care physician or speciality clinic   (orthopedics) within 2-5 days if your signs and symptoms have not resolved or worsen. Patient can call our Referral Hotline at [phone] to make an appointment.    Please return here or go to the Emergency Department for any concerns or worsening of condition.Patient was educated on signs/symptoms that would warrant emergent medical attention. Patient verbalized understanding.  More trouble getting up from a chair, going up and down stairs, or walking  Pain, swelling, warmth, numbness, tingling, or discoloration in the calf below the injured or sore knee  You are not feeling better in 2 or 3 days or you are feeling worse

## 2025-03-05 ENCOUNTER — ANTI-COAG VISIT (OUTPATIENT)
Dept: CARDIOLOGY | Facility: CLINIC | Age: 75
End: 2025-03-05
Payer: MEDICARE

## 2025-03-05 ENCOUNTER — LAB VISIT (OUTPATIENT)
Dept: LAB | Facility: HOSPITAL | Age: 75
End: 2025-03-05
Attending: INTERNAL MEDICINE
Payer: MEDICARE

## 2025-03-05 DIAGNOSIS — Z79.01 LONG TERM CURRENT USE OF ANTICOAGULANT THERAPY: ICD-10-CM

## 2025-03-05 DIAGNOSIS — D68.61 ANTI-PHOSPHOLIPID ANTIBODY SYNDROME: Primary | Chronic | ICD-10-CM

## 2025-03-05 DIAGNOSIS — D68.61 ANTI-PHOSPHOLIPID ANTIBODY SYNDROME: Chronic | ICD-10-CM

## 2025-03-05 DIAGNOSIS — M32.19 OTHER SYSTEMIC LUPUS ERYTHEMATOSUS WITH OTHER ORGAN INVOLVEMENT: ICD-10-CM

## 2025-03-05 LAB
INR PPP: 2.9 (ref 0.8–1.2)
PROTHROMBIN TIME: 29.2 SEC (ref 9–12.5)

## 2025-03-05 PROCEDURE — 85610 PROTHROMBIN TIME: CPT | Mod: HCNC | Performed by: INTERNAL MEDICINE

## 2025-03-05 PROCEDURE — 36415 COLL VENOUS BLD VENIPUNCTURE: CPT | Mod: HCNC,PO | Performed by: INTERNAL MEDICINE

## 2025-03-05 PROCEDURE — 93793 ANTICOAG MGMT PT WARFARIN: CPT | Mod: S$GLB,,,

## 2025-03-05 NOTE — PROGRESS NOTES
[facility]    2025 4:19 PM    Assessment/Plan:    Patient presents today with therapeutic INR.    Assessment of patient findings and chart review: Reviewed    Recommendation for patient's warfarin regimen: Continue current maintenance dose    Recommend repeat INR in 3 weeks  _________________________________________________________________    Viraj Olsen (74 y.o.) is followed by the [facility].    Anticoagulation Summary  As of 3/5/2025      INR goal:  2.5-3.5   TTR:  67.7% (12.6 y)   INR used for dosin.9 (3/5/2025)   Warfarin maintenance plan:  5 mg (5 mg x 1) every Sun, Wed, Fri; 10 mg (5 mg x 2) all other days   Weekly warfarin total:  55 mg   Plan last modified:  Camelia Christopher, PharmD (2024)   Next INR check:  3/26/2025   Target end date:  --    Indications    Other forms of systemic lupus erythematosus [M32.8]  Impending cerebrovascular accident (Resolved) [I63.9]  Anti-phospholipid antibody syndrome [D68.61]  Long term current use of anticoagulant therapy [Z79.01]                 Anticoagulation Episode Summary       INR check location:  Clinic Lab    Preferred lab:  [facility]    Send INR reminders to:  [facility] COUMADIN MONITORING POOL    Comments:  [facility] only Mon - Thu (INRs ) // Antiphospholipid - must go to lab          Anticoagulation Care Providers       Provider Role Specialty Phone number    Preet Quintero MD Responsible Rheumatology [phone]

## 2025-03-19 ENCOUNTER — TELEPHONE (OUTPATIENT)
Dept: FAMILY MEDICINE | Facility: CLINIC | Age: 75
End: 2025-03-19
Payer: MEDICARE

## 2025-03-19 NOTE — TELEPHONE ENCOUNTER
----- Message from Cris sent at 3/19/2025  1:29 PM CDT -----  Type:  Pharmacy Calling to Clarify an RXName of Caller:Josr GIBBS/ danilo pharm Pharmacy Name:[facility]/pharmacy #[id number] - [place] - [address] AT [place] [address] [address] [address]Hbsxy: [phone] Fax: [phone] Name:warfarin (COUMADIN) 5 MG tabletWhat do they need to clarify?:dosage Best Call Back Number:[phone] Information:

## 2025-03-19 NOTE — TELEPHONE ENCOUNTER
Just needed to verify the new dosing per coumadin clinic.  Read the note and their calendar and told pharmacy

## 2025-03-22 DIAGNOSIS — D68.61 ANTI-PHOSPHOLIPID ANTIBODY SYNDROME: ICD-10-CM

## 2025-03-22 DIAGNOSIS — M32.9 SYSTEMIC LUPUS ERYTHEMATOSUS, UNSPECIFIED SLE TYPE, UNSPECIFIED ORGAN INVOLVEMENT STATUS: ICD-10-CM

## 2025-03-22 DIAGNOSIS — Z79.01 LONG TERM CURRENT USE OF ANTICOAGULANT THERAPY: ICD-10-CM

## 2025-03-24 RX ORDER — WARFARIN SODIUM 5 MG/1
TABLET ORAL
Qty: 195 TABLET | Refills: 0 | Status: SHIPPED | OUTPATIENT
Start: 2025-03-24

## 2025-03-26 ENCOUNTER — LAB VISIT (OUTPATIENT)
Dept: LAB | Facility: HOSPITAL | Age: 75
End: 2025-03-26
Attending: INTERNAL MEDICINE
Payer: MEDICARE

## 2025-03-26 DIAGNOSIS — D68.61 ANTI-PHOSPHOLIPID ANTIBODY SYNDROME: Chronic | ICD-10-CM

## 2025-03-26 DIAGNOSIS — Z79.01 LONG TERM CURRENT USE OF ANTICOAGULANT THERAPY: ICD-10-CM

## 2025-03-26 DIAGNOSIS — M32.19 OTHER SYSTEMIC LUPUS ERYTHEMATOSUS WITH OTHER ORGAN INVOLVEMENT: ICD-10-CM

## 2025-03-26 LAB
INR PPP: 3.1 (ref 0.8–1.2)
PROTHROMBIN TIME: 31.2 SECONDS (ref 9–12.5)

## 2025-03-26 PROCEDURE — 36415 COLL VENOUS BLD VENIPUNCTURE: CPT | Mod: HCNC,PO

## 2025-03-26 PROCEDURE — 85610 PROTHROMBIN TIME: CPT | Mod: HCNC

## 2025-03-27 ENCOUNTER — ANTI-COAG VISIT (OUTPATIENT)
Dept: CARDIOLOGY | Facility: CLINIC | Age: 75
End: 2025-03-27
Payer: MEDICARE

## 2025-03-27 DIAGNOSIS — M32.19 OTHER SYSTEMIC LUPUS ERYTHEMATOSUS WITH OTHER ORGAN INVOLVEMENT: Primary | ICD-10-CM

## 2025-03-27 DIAGNOSIS — D68.61 ANTI-PHOSPHOLIPID ANTIBODY SYNDROME: Chronic | ICD-10-CM

## 2025-03-27 DIAGNOSIS — Z79.01 LONG TERM CURRENT USE OF ANTICOAGULANT THERAPY: ICD-10-CM

## 2025-03-27 PROCEDURE — 93793 ANTICOAG MGMT PT WARFARIN: CPT | Mod: S$GLB,,,

## 2025-03-27 NOTE — PROGRESS NOTES
[facility]    03/27/2025 7:33 AM    Assessment/Plan:    Patient presents today with therapeutic INR.    Assessment of patient findings and chart review: no significant findings     Recommendation for patient's warfarin regimen: Continue current maintenance dose    Recommend repeat INR in 4 weeks  _________________________________________________________________    Viraj Olsen (74 y.o.) is followed by the [facility].    Anticoagulation Summary  As of 3/27/2025      INR goal:  2.5-3.5   TTR:  67.8% (12.7 y)   INR used for dosing:  3.1 (3/26/2025)   Warfarin maintenance plan:  5 mg (5 mg x 1) every Sun, Wed, Fri; 10 mg (5 mg x 2) all other days   Weekly warfarin total:  55 mg   Plan last modified:  Camelia Christopher, PharmD (12/31/2024)   Next INR check:  4/24/2025   Target end date:  --    Indications    Other forms of systemic lupus erythematosus [M32.8]  Impending cerebrovascular accident (Resolved) [I63.9]  Anti-phospholipid antibody syndrome [D68.61]  Long term current use of anticoagulant therapy [Z79.01]                 Anticoagulation Episode Summary       INR check location:  Clinic Lab    Preferred lab:  [facility]    Send INR reminders to:  [facility] COUMADIN MONITORING POOL    Comments:  [facility] only Mon - Thu (INRs Thursdays) // Antiphospholipid - must go to lab          Anticoagulation Care Providers       Provider Role Specialty Phone number    Preet Quintero MD Responsible Rheumatology [phone]

## 2025-04-15 ENCOUNTER — OFFICE VISIT (OUTPATIENT)
Dept: DERMATOLOGY | Facility: CLINIC | Age: 75
End: 2025-04-15
Payer: MEDICARE

## 2025-04-15 DIAGNOSIS — D22.9 MULTIPLE BENIGN NEVI: ICD-10-CM

## 2025-04-15 DIAGNOSIS — Z85.828 HISTORY OF NONMELANOMA SKIN CANCER: ICD-10-CM

## 2025-04-15 DIAGNOSIS — Z85.820 HISTORY OF MALIGNANT MELANOMA OF SKIN: ICD-10-CM

## 2025-04-15 DIAGNOSIS — D23.9 DERMATOFIBROMA: ICD-10-CM

## 2025-04-15 DIAGNOSIS — L82.1 SK (SEBORRHEIC KERATOSIS): ICD-10-CM

## 2025-04-15 DIAGNOSIS — L81.4 LENTIGO: ICD-10-CM

## 2025-04-15 DIAGNOSIS — L57.0 AK (ACTINIC KERATOSIS): Primary | ICD-10-CM

## 2025-04-15 PROCEDURE — 99213 OFFICE O/P EST LOW 20 MIN: CPT | Mod: 25,S$GLB,, | Performed by: DERMATOLOGY

## 2025-04-15 PROCEDURE — 1125F AMNT PAIN NOTED PAIN PRSNT: CPT | Mod: CPTII,S$GLB,, | Performed by: DERMATOLOGY

## 2025-04-15 PROCEDURE — 1159F MED LIST DOCD IN RCRD: CPT | Mod: CPTII,S$GLB,, | Performed by: DERMATOLOGY

## 2025-04-15 PROCEDURE — 17000 DESTRUCT PREMALG LESION: CPT | Mod: S$GLB,,, | Performed by: DERMATOLOGY

## 2025-04-15 PROCEDURE — 3288F FALL RISK ASSESSMENT DOCD: CPT | Mod: CPTII,S$GLB,, | Performed by: DERMATOLOGY

## 2025-04-15 PROCEDURE — 17003 DESTRUCT PREMALG LES 2-14: CPT | Mod: S$GLB,,, | Performed by: DERMATOLOGY

## 2025-04-15 PROCEDURE — 1101F PT FALLS ASSESS-DOCD LE1/YR: CPT | Mod: CPTII,S$GLB,, | Performed by: DERMATOLOGY

## 2025-04-15 PROCEDURE — 99999 PR PBB SHADOW E&M-EST. PATIENT-LVL III: CPT | Mod: PBBFAC,,, | Performed by: DERMATOLOGY

## 2025-04-15 PROCEDURE — 1160F RVW MEDS BY RX/DR IN RCRD: CPT | Mod: CPTII,S$GLB,, | Performed by: DERMATOLOGY

## 2025-04-15 NOTE — PATIENT INSTRUCTIONS

## 2025-04-23 ENCOUNTER — PATIENT MESSAGE (OUTPATIENT)
Dept: RHEUMATOLOGY | Facility: CLINIC | Age: 75
End: 2025-04-23
Payer: MEDICARE

## 2025-04-24 ENCOUNTER — ANTI-COAG VISIT (OUTPATIENT)
Dept: CARDIOLOGY | Facility: CLINIC | Age: 75
End: 2025-04-24
Payer: MEDICARE

## 2025-04-24 ENCOUNTER — LAB VISIT (OUTPATIENT)
Dept: LAB | Facility: HOSPITAL | Age: 75
End: 2025-04-24
Attending: INTERNAL MEDICINE
Payer: MEDICARE

## 2025-04-24 ENCOUNTER — PATIENT MESSAGE (OUTPATIENT)
Dept: RHEUMATOLOGY | Facility: CLINIC | Age: 75
End: 2025-04-24
Payer: MEDICARE

## 2025-04-24 DIAGNOSIS — Z79.01 LONG TERM CURRENT USE OF ANTICOAGULANT THERAPY: ICD-10-CM

## 2025-04-24 DIAGNOSIS — D68.61 ANTI-PHOSPHOLIPID ANTIBODY SYNDROME: Chronic | ICD-10-CM

## 2025-04-24 DIAGNOSIS — M32.19 OTHER SYSTEMIC LUPUS ERYTHEMATOSUS WITH OTHER ORGAN INVOLVEMENT: ICD-10-CM

## 2025-04-24 DIAGNOSIS — D68.61 ANTI-PHOSPHOLIPID ANTIBODY SYNDROME: Primary | Chronic | ICD-10-CM

## 2025-04-24 LAB
ABSOLUTE EOSINOPHIL (OHS): 0.2 K/UL
ABSOLUTE MONOCYTE (OHS): 0.39 K/UL (ref 0.3–1)
ABSOLUTE NEUTROPHIL COUNT (OHS): 1.69 K/UL (ref 1.8–7.7)
ALBUMIN SERPL BCP-MCNC: 4.1 G/DL (ref 3.5–5.2)
ALP SERPL-CCNC: 61 UNIT/L (ref 40–150)
ALT SERPL W/O P-5'-P-CCNC: 23 UNIT/L (ref 10–44)
ANION GAP (OHS): 6 MMOL/L (ref 8–16)
AST SERPL-CCNC: 28 UNIT/L (ref 11–45)
BASOPHILS # BLD AUTO: 0.03 K/UL
BASOPHILS NFR BLD AUTO: 0.8 %
BILIRUB SERPL-MCNC: 0.5 MG/DL (ref 0.1–1)
BILIRUB UR QL STRIP.AUTO: NEGATIVE
BUN SERPL-MCNC: 20 MG/DL (ref 8–23)
C3 SERPL-MCNC: 73 MG/DL (ref 50–180)
C4 COMPLEMENT (OHS): 10 MG/DL (ref 11–44)
CALCIUM SERPL-MCNC: 10 MG/DL (ref 8.7–10.5)
CHLORIDE SERPL-SCNC: 104 MMOL/L (ref 95–110)
CLARITY UR: CLEAR
CO2 SERPL-SCNC: 27 MMOL/L (ref 23–29)
COLOR UR AUTO: YELLOW
CREAT SERPL-MCNC: 1 MG/DL (ref 0.5–1.4)
CRP SERPL-MCNC: 1.1 MG/L
ERYTHROCYTE [DISTWIDTH] IN BLOOD BY AUTOMATED COUNT: 12.5 % (ref 11.5–14.5)
ERYTHROCYTE [SEDIMENTATION RATE] IN BLOOD BY PHOTOMETRIC METHOD: 6 MM/HR
GFR SERPLBLD CREATININE-BSD FMLA CKD-EPI: >60 ML/MIN/1.73/M2
GLUCOSE SERPL-MCNC: 60 MG/DL (ref 70–110)
GLUCOSE UR QL STRIP: NEGATIVE
HCT VFR BLD AUTO: 37 % (ref 40–54)
HGB BLD-MCNC: 12.1 GM/DL (ref 14–18)
HGB UR QL STRIP: NEGATIVE
IMM GRANULOCYTES # BLD AUTO: 0 K/UL (ref 0–0.04)
IMM GRANULOCYTES NFR BLD AUTO: 0 % (ref 0–0.5)
INR PPP: 2.4 (ref 0.8–1.2)
KETONES UR QL STRIP: NEGATIVE
LEUKOCYTE ESTERASE UR QL STRIP: NEGATIVE
LYMPHOCYTES # BLD AUTO: 1.22 K/UL (ref 1–4.8)
MCH RBC QN AUTO: 30.9 PG (ref 27–31)
MCHC RBC AUTO-ENTMCNC: 32.7 G/DL (ref 32–36)
MCV RBC AUTO: 95 FL (ref 82–98)
NITRITE UR QL STRIP: NEGATIVE
NUCLEATED RBC (/100WBC) (OHS): 0 /100 WBC
PH UR STRIP: 7 [PH]
PLATELET # BLD AUTO: 228 K/UL (ref 150–450)
PMV BLD AUTO: 9.4 FL (ref 9.2–12.9)
POTASSIUM SERPL-SCNC: 4.4 MMOL/L (ref 3.5–5.1)
PROT SERPL-MCNC: 7.1 GM/DL (ref 6–8.4)
PROT UR QL STRIP: NEGATIVE
PROTHROMBIN TIME: 26.2 SECONDS (ref 9–12.5)
RBC # BLD AUTO: 3.91 M/UL (ref 4.6–6.2)
RELATIVE EOSINOPHIL (OHS): 5.7 %
RELATIVE LYMPHOCYTE (OHS): 34.6 % (ref 18–48)
RELATIVE MONOCYTE (OHS): 11 % (ref 4–15)
RELATIVE NEUTROPHIL (OHS): 47.9 % (ref 38–73)
SODIUM SERPL-SCNC: 137 MMOL/L (ref 136–145)
SP GR UR STRIP: 1.01
UROBILINOGEN UR STRIP-ACNC: NEGATIVE EU/DL
WBC # BLD AUTO: 3.53 K/UL (ref 3.9–12.7)

## 2025-04-24 PROCEDURE — 81003 URINALYSIS AUTO W/O SCOPE: CPT | Mod: HCNC

## 2025-04-24 PROCEDURE — 86225 DNA ANTIBODY NATIVE: CPT | Mod: HCNC

## 2025-04-24 PROCEDURE — 85025 COMPLETE CBC W/AUTO DIFF WBC: CPT | Mod: HCNC

## 2025-04-24 PROCEDURE — 93793 ANTICOAG MGMT PT WARFARIN: CPT | Mod: S$GLB,,,

## 2025-04-24 PROCEDURE — 86140 C-REACTIVE PROTEIN: CPT | Mod: HCNC

## 2025-04-24 PROCEDURE — 85610 PROTHROMBIN TIME: CPT | Mod: HCNC

## 2025-04-24 PROCEDURE — 84075 ASSAY ALKALINE PHOSPHATASE: CPT | Mod: HCNC

## 2025-04-24 PROCEDURE — 36415 COLL VENOUS BLD VENIPUNCTURE: CPT | Mod: HCNC

## 2025-04-24 PROCEDURE — 86160 COMPLEMENT ANTIGEN: CPT | Mod: 59,HCNC

## 2025-04-24 PROCEDURE — 86160 COMPLEMENT ANTIGEN: CPT | Mod: HCNC

## 2025-04-24 PROCEDURE — 85652 RBC SED RATE AUTOMATED: CPT | Mod: HCNC

## 2025-04-24 NOTE — PROGRESS NOTES
[facility]    2025 11:28 AM    Assessment/Plan:    Patient presents today with subtherapeutic  INR.    Assessment of patient findings and chart review: no significant findings     Recommendation for patient's warfarin regimen: Boost dose today to 12.5mg then resume current maintenance dose    Recommend repeat INR in 2 weeks  _________________________________________________________________    Viraj Olsen (74 y.o.) is followed by the [email or web address].    Anticoagulation Summary  As of 2025      INR goal:  2.5-3.5   TTR:  67.9% (12.7 y)   INR used for dosin.4 (2025)   Warfarin maintenance plan:  5 mg (5 mg x 1) every Sun, Wed, Fri; 10 mg (5 mg x 2) all other days   Weekly warfarin total:  55 mg   Plan last modified:  Camelia Christopher, PharmD (2024)   Next INR check:  2025   Target end date:  --    Indications    Other forms of systemic lupus erythematosus [M32.8]  Impending cerebrovascular accident (Resolved) [I63.9]  Anti-phospholipid antibody syndrome [D68.61]  Long term current use of anticoagulant therapy [Z79.01]                 Anticoagulation Episode Summary       INR check location:  Clinic Lab    Preferred lab:  [facility]    Send INR reminders to:  [facility] COUMADIN MONITORING POOL    Comments:  [facility] only Mon - Thu (INRs ) // Antiphospholipid - must go to lab          Anticoagulation Care Providers       Provider Role Specialty Phone number    Preet Quintero MD Responsible Rheumatology [phone]

## 2025-04-25 LAB
DSDNA ANTIBODY (OHS): NORMAL
DSDNA ANTIBODY TITER (OHS): NORMAL

## 2025-05-07 ENCOUNTER — OFFICE VISIT (OUTPATIENT)
Dept: RHEUMATOLOGY | Facility: CLINIC | Age: 75
End: 2025-05-07
Payer: MEDICARE

## 2025-05-07 ENCOUNTER — ANTI-COAG VISIT (OUTPATIENT)
Dept: CARDIOLOGY | Facility: CLINIC | Age: 75
End: 2025-05-07
Payer: MEDICARE

## 2025-05-07 ENCOUNTER — LAB VISIT (OUTPATIENT)
Dept: LAB | Facility: HOSPITAL | Age: 75
End: 2025-05-07
Attending: INTERNAL MEDICINE
Payer: MEDICARE

## 2025-05-07 ENCOUNTER — PATIENT MESSAGE (OUTPATIENT)
Dept: RHEUMATOLOGY | Facility: CLINIC | Age: 75
End: 2025-05-07

## 2025-05-07 VITALS
HEART RATE: 61 BPM | WEIGHT: 180.75 LBS | DIASTOLIC BLOOD PRESSURE: 74 MMHG | SYSTOLIC BLOOD PRESSURE: 117 MMHG | HEIGHT: 70 IN | BODY MASS INDEX: 25.88 KG/M2

## 2025-05-07 DIAGNOSIS — I70.0 AORTIC ATHEROSCLEROSIS: ICD-10-CM

## 2025-05-07 DIAGNOSIS — D68.61 ANTI-PHOSPHOLIPID ANTIBODY SYNDROME: Primary | Chronic | ICD-10-CM

## 2025-05-07 DIAGNOSIS — Z79.01 LONG TERM CURRENT USE OF ANTICOAGULANT THERAPY: ICD-10-CM

## 2025-05-07 DIAGNOSIS — D68.61 ANTI-PHOSPHOLIPID ANTIBODY SYNDROME: Chronic | ICD-10-CM

## 2025-05-07 DIAGNOSIS — Z79.899 HIGH RISK MEDICATION USE: ICD-10-CM

## 2025-05-07 DIAGNOSIS — M17.2 POST-TRAUMATIC OSTEOARTHRITIS OF BOTH KNEES: ICD-10-CM

## 2025-05-07 DIAGNOSIS — M32.19 OTHER SYSTEMIC LUPUS ERYTHEMATOSUS WITH OTHER ORGAN INVOLVEMENT: ICD-10-CM

## 2025-05-07 DIAGNOSIS — M32.19 OTHER SYSTEMIC LUPUS ERYTHEMATOSUS WITH OTHER ORGAN INVOLVEMENT: Primary | ICD-10-CM

## 2025-05-07 DIAGNOSIS — G25.81 RESTLESS LEGS SYNDROME (RLS): Chronic | ICD-10-CM

## 2025-05-07 PROBLEM — D84.821 DRUG-INDUCED IMMUNODEFICIENCY: Status: RESOLVED | Noted: 2023-10-17 | Resolved: 2025-05-07

## 2025-05-07 LAB
INR PPP: 4 (ref 0.8–1.2)
PROTHROMBIN TIME: 41.4 SECONDS (ref 9–12.5)

## 2025-05-07 PROCEDURE — 99999 PR PBB SHADOW E&M-EST. PATIENT-LVL III: CPT | Mod: PBBFAC,HCNC,, | Performed by: INTERNAL MEDICINE

## 2025-05-07 PROCEDURE — 36415 COLL VENOUS BLD VENIPUNCTURE: CPT | Mod: HCNC

## 2025-05-07 PROCEDURE — 85610 PROTHROMBIN TIME: CPT | Mod: HCNC

## 2025-05-07 RX ORDER — ROPINIROLE 0.5 MG/1
.5-1 TABLET, FILM COATED ORAL NIGHTLY PRN
Qty: 180 TABLET | Refills: 2 | Status: SHIPPED | OUTPATIENT
Start: 2025-05-07 | End: 2026-05-07

## 2025-05-07 ASSESSMENT — SYSTEMIC LUPUS ERYTHEMATOSUS DISEASE ACTIVITY INDEX (SLEDAI): TOTAL_SCORE: 2

## 2025-05-07 ASSESSMENT — ROUTINE ASSESSMENT OF PATIENT INDEX DATA (RAPID3)
PSYCHOLOGICAL DISTRESS SCORE: 0
PATIENT GLOBAL ASSESSMENT SCORE: 2.5
MDHAQ FUNCTION SCORE: 0
FATIGUE SCORE: 0
PAIN SCORE: 2
TOTAL RAPID3 SCORE: 1.5

## 2025-05-07 NOTE — ASSESSMENT & PLAN NOTE
SLE clinically quiet; labs show persistent low C4 and mild low WBC and Hgb    SLE is stable on hydroxychloroquine monotherapy and he will continue this     Return to clinic me 6 month with lab

## 2025-05-07 NOTE — PROGRESS NOTES
[facility]    2025 12:59 PM    Assessment/Plan:    Patient presents today with supratherapeutic INR.    Assessment of patient findings and chart review: no significant findings     Recommendation for patient's warfarin regimen: Lower dose today to 2.5mg then resume current maintenance dose    Recommend repeat INR in 1 week  _________________________________________________________________    Viraj Olsen (74 y.o.) is followed by the [facility].    Anticoagulation Summary  As of 2025      INR goal:  2.5-3.5   TTR:  67.9% (12.8 y)   INR used for dosin.0 (2025)   Warfarin maintenance plan:  5 mg (5 mg x 1) every Sun, Wed, Fri; 10 mg (5 mg x 2) all other days   Weekly warfarin total:  55 mg   Plan last modified:  Camelia Christopher, PharmD (2024)   Next INR check:  2025   Target end date:  --    Indications    Other forms of systemic lupus erythematosus [M32.8]  Impending cerebrovascular accident (Resolved) [I63.9]  Anti-phospholipid antibody syndrome [D68.61]  Long term current use of anticoagulant therapy [Z79.01]                 Anticoagulation Episode Summary       INR check location:  Clinic Lab    Preferred lab:  [facility]    Send INR reminders to:  [facility] COUMADIN MONITORING POOL    Comments:  [facility] only Mon - Thu (INRs ) // Antiphospholipid - must go to lab          Anticoagulation Care Providers       Provider Role Specialty Phone number    Preet Quintero MD Responsible Rheumatology [phone]

## 2025-05-07 NOTE — PROGRESS NOTES
"Subjective:       Patient ID: Viraj Olsen is a 74 y.o. male.    Chief Complaint: Disease Management    HPI      Retired 2023    F/u SLE , APLA    Systemic lupus erythematosus since 1995, with neuro-psychiatric lesions in 2006 with recurrence in 2007, treated to remission with hydroxychloroquine, azathioprine, and prednisone.  HCQ since 1995    Hx antiphospholipid with TIA's, on chronic coumadin since 2006     Uses treadmill most days but has to hold onto railings for faster speed    Lower back ache forever  L thumb pain at [facility] sometimes very painful  RLS relieved by     Skiin cancer; had melanoma L ear and Basal cell L post neck last  year     Still Bowls about 3/week  Remodeling outdoor kitchen     Balance issue improved with ear cleaning     February tripped and fell on cement and injured R knee    Also discussed difficulty with bending forward while walking    Review of Systems   Constitutional:  Negative for fatigue, fever and unexpected weight change.   HENT:  Negative for mouth sores.    Respiratory:  Negative for cough and shortness of breath.    Cardiovascular:  Negative for chest pain.   Gastrointestinal:  Negative for diarrhea.   Genitourinary:  Negative for dysuria.   Skin:  Negative for rash.   Neurological:  Negative for headaches.   Psychiatric/Behavioral:  Negative for sleep disturbance.          Objective:   /74 (BP Location: Right arm, Patient Position: Sitting)   Pulse 61   Ht 5' 10" (1.778 m)   Wt 82 kg (180 lb 12.4 oz)   BMI 25.94 kg/m²      Physical Exam   Constitutional: normal appearance.   HENT:   Nose: Nose normal.   Mouth/Throat: No ulcers  Eyes: No scleral icterus.   Cardiovascular: Normal rate and regular rhythm.   Murmur heard.  Pulmonary/Chest: He has no wheezes. He has no rales.   Abdominal: There is no abdominal tenderness.   Musculoskeletal:         General: Tenderness present.      Comments: L shoulder pain on active abduction/elevation with painful arc; also bicipital " tenderness   Lymphadenopathy:     He has no cervical adenopathy.   Skin: No rash noted.         Assessment:       1. Other systemic lupus erythematosus with other organ involvement    2. Restless legs syndrome (RLS)    3. Anti-phospholipid antibody syndrome    4. Post-traumatic osteoarthritis of both knees    5. High risk medication use    6. Aortic atherosclerosis          L shoulder pain with rotator cuff and bicipital tendinitis on physical examination; advised rest, stretch, celecoxib    Plan:       Problem List Items Addressed This Visit          Active Problems    Anti-phospholipid antibody syndrome (Chronic)    No events while on warfarin which he will continue indefinitely         Restless legs syndrome (RLS) (Chronic)    Has had to increase ropinirole on some nights but still only 0.5-1 mg hs          Relevant Medications    rOPINIRole (REQUIP) 0.5 MG tablet    Other forms of systemic lupus erythematosus - Primary    SLE clinically quiet; labs show persistent low C4 and mild low WBC and Hgb    SLE is stable on hydroxychloroquine monotherapy and he will continue this     Return to clinic me 6 month with lab         Post-traumatic osteoarthritis of knee    Doing well with knees and very physically active         High risk medication use    Is up to date on eye checks; will plan hydroxychloroquine blood level next time         Relevant Orders    Hydroxychloroquine (Plaquenil), blood    Aortic atherosclerosis    Has systolic murmur of aortic sclerosis but no symptoms of dyspnea or chest pain

## 2025-05-14 ENCOUNTER — ANTI-COAG VISIT (OUTPATIENT)
Dept: CARDIOLOGY | Facility: CLINIC | Age: 75
End: 2025-05-14
Payer: MEDICARE

## 2025-05-14 ENCOUNTER — LAB VISIT (OUTPATIENT)
Dept: LAB | Facility: HOSPITAL | Age: 75
End: 2025-05-14
Attending: INTERNAL MEDICINE
Payer: MEDICARE

## 2025-05-14 DIAGNOSIS — Z79.01 LONG TERM CURRENT USE OF ANTICOAGULANT THERAPY: ICD-10-CM

## 2025-05-14 DIAGNOSIS — D68.61 ANTI-PHOSPHOLIPID ANTIBODY SYNDROME: Chronic | ICD-10-CM

## 2025-05-14 DIAGNOSIS — M32.19 OTHER SYSTEMIC LUPUS ERYTHEMATOSUS WITH OTHER ORGAN INVOLVEMENT: ICD-10-CM

## 2025-05-14 DIAGNOSIS — M32.19 OTHER SYSTEMIC LUPUS ERYTHEMATOSUS WITH OTHER ORGAN INVOLVEMENT: Primary | ICD-10-CM

## 2025-05-14 LAB
INR PPP: 2 (ref 0.8–1.2)
PROTHROMBIN TIME: 22.2 SECONDS (ref 9–12.5)

## 2025-05-14 PROCEDURE — 93793 ANTICOAG MGMT PT WARFARIN: CPT | Mod: S$GLB,,,

## 2025-05-14 PROCEDURE — 85610 PROTHROMBIN TIME: CPT | Mod: HCNC

## 2025-05-14 PROCEDURE — 36415 COLL VENOUS BLD VENIPUNCTURE: CPT | Mod: HCNC

## 2025-05-14 NOTE — PROGRESS NOTES
[facility]    2025 11:07 AM    Assessment/Plan:    Patient presents today with subtherapeutic  INR.    Assessment of patient findings and chart review: reports more greens than usual    Recommendation for patient's warfarin regimen: Boost dose today to 7.5mg then resume current maintenance dose    Recommend repeat INR in 1 week  _________________________________________________________________    Viraj Olsen (74 y.o.) is followed by the [facility].    Anticoagulation Summary  As of 2025      INR goal:  2.5-3.5   TTR:  67.9% (12.8 y)   INR used for dosin.0 (2025)   Warfarin maintenance plan:  5 mg (5 mg x 1) every Sun, Wed, Fri; 10 mg (5 mg x 2) all other days   Weekly warfarin total:  55 mg   Plan last modified:  Camelia Christopher, PharmD (2024)   Next INR check:  2025   Target end date:  --    Indications    Other forms of systemic lupus erythematosus [M32.8]  Impending cerebrovascular accident (Resolved) [I63.9]  Anti-phospholipid antibody syndrome [D68.61]  Long term current use of anticoagulant therapy [Z79.01]                 Anticoagulation Episode Summary       INR check location:  Clinic Lab    Preferred lab:  [facility]    Send INR reminders to:  [facility] COUMADIN MONITORING POOL    Comments:  [facility] only Mon - Thu (INRs ) // Antiphospholipid - must go to lab          Anticoagulation Care Providers       Provider Role Specialty Phone number    Preet Quintero MD Responsible Rheumatology [phone]

## 2025-05-21 ENCOUNTER — LAB VISIT (OUTPATIENT)
Dept: LAB | Facility: HOSPITAL | Age: 75
End: 2025-05-21
Attending: INTERNAL MEDICINE
Payer: MEDICARE

## 2025-05-21 ENCOUNTER — ANTI-COAG VISIT (OUTPATIENT)
Dept: CARDIOLOGY | Facility: CLINIC | Age: 75
End: 2025-05-21
Payer: MEDICARE

## 2025-05-21 DIAGNOSIS — M32.19 OTHER SYSTEMIC LUPUS ERYTHEMATOSUS WITH OTHER ORGAN INVOLVEMENT: ICD-10-CM

## 2025-05-21 DIAGNOSIS — Z79.01 LONG TERM CURRENT USE OF ANTICOAGULANT THERAPY: ICD-10-CM

## 2025-05-21 DIAGNOSIS — D68.61 ANTI-PHOSPHOLIPID ANTIBODY SYNDROME: Chronic | ICD-10-CM

## 2025-05-21 DIAGNOSIS — D68.61 ANTI-PHOSPHOLIPID ANTIBODY SYNDROME: Primary | Chronic | ICD-10-CM

## 2025-05-21 LAB
INR PPP: 2.5 (ref 0.8–1.2)
PROTHROMBIN TIME: 26.7 SECONDS (ref 9–12.5)

## 2025-05-21 PROCEDURE — 36415 COLL VENOUS BLD VENIPUNCTURE: CPT | Mod: HCNC

## 2025-05-21 PROCEDURE — 93793 ANTICOAG MGMT PT WARFARIN: CPT | Mod: S$GLB,,,

## 2025-05-21 PROCEDURE — 85610 PROTHROMBIN TIME: CPT | Mod: HCNC

## 2025-05-21 NOTE — PROGRESS NOTES
[facility]    2025 8:43 AM    Assessment/Plan:    Patient presents today with therapeutic INR.    Assessment of patient findings and chart review: no significant findings     Recommendation for patient's warfarin regimen: Continue current maintenance dose    Recommend repeat INR in 1 week  _________________________________________________________________    Viraj Olsen (74 y.o.) is followed by the [facility].    Anticoagulation Summary  As of 2025      INR goal:  2.5-3.5   TTR:  67.8% (12.8 y)   INR used for dosin.5 (2025)   Warfarin maintenance plan:  5 mg (5 mg x 1) every Sun, Wed, Fri; 10 mg (5 mg x 2) all other days   Weekly warfarin total:  55 mg   Plan last modified:  Camelia Christopher, PharmD (2024)   Next INR check:  2025   Target end date:  --    Indications    Other forms of systemic lupus erythematosus [M32.8]  Impending cerebrovascular accident (Resolved) [I63.9]  Anti-phospholipid antibody syndrome [D68.61]  Long term current use of anticoagulant therapy [Z79.01]                 Anticoagulation Episode Summary       INR check location:  Clinic Lab    Preferred lab:  [facility]    Send INR reminders to:  [facility] COUMADIN MONITORING POOL    Comments:  [facility] only Mon - Thu (INRs ) // Antiphospholipid - must go to lab          Anticoagulation Care Providers       Provider Role Specialty Phone number    Preet Quintero MD Responsible Rheumatology [phone]

## 2025-06-02 ENCOUNTER — LAB VISIT (OUTPATIENT)
Dept: LAB | Facility: HOSPITAL | Age: 75
End: 2025-06-02
Attending: INTERNAL MEDICINE
Payer: MEDICARE

## 2025-06-02 ENCOUNTER — ANTI-COAG VISIT (OUTPATIENT)
Dept: CARDIOLOGY | Facility: CLINIC | Age: 75
End: 2025-06-02
Payer: MEDICARE

## 2025-06-02 DIAGNOSIS — D68.61 ANTI-PHOSPHOLIPID ANTIBODY SYNDROME: Primary | Chronic | ICD-10-CM

## 2025-06-02 DIAGNOSIS — D68.61 ANTI-PHOSPHOLIPID ANTIBODY SYNDROME: Chronic | ICD-10-CM

## 2025-06-02 DIAGNOSIS — M32.19 OTHER SYSTEMIC LUPUS ERYTHEMATOSUS WITH OTHER ORGAN INVOLVEMENT: ICD-10-CM

## 2025-06-02 DIAGNOSIS — Z79.01 LONG TERM CURRENT USE OF ANTICOAGULANT THERAPY: ICD-10-CM

## 2025-06-02 LAB
INR PPP: 2.7 (ref 0.8–1.2)
PROTHROMBIN TIME: 28.7 SECONDS (ref 9–12.5)

## 2025-06-02 PROCEDURE — 36415 COLL VENOUS BLD VENIPUNCTURE: CPT

## 2025-06-02 PROCEDURE — 85610 PROTHROMBIN TIME: CPT

## 2025-06-02 PROCEDURE — 93793 ANTICOAG MGMT PT WARFARIN: CPT | Mod: S$GLB,,,

## 2025-06-16 ENCOUNTER — ANTI-COAG VISIT (OUTPATIENT)
Dept: CARDIOLOGY | Facility: CLINIC | Age: 75
End: 2025-06-16
Payer: MEDICARE

## 2025-06-16 ENCOUNTER — LAB VISIT (OUTPATIENT)
Dept: LAB | Facility: HOSPITAL | Age: 75
End: 2025-06-16
Attending: INTERNAL MEDICINE
Payer: MEDICARE

## 2025-06-16 DIAGNOSIS — D68.61 ANTI-PHOSPHOLIPID ANTIBODY SYNDROME: Chronic | ICD-10-CM

## 2025-06-16 DIAGNOSIS — Z79.01 LONG TERM CURRENT USE OF ANTICOAGULANT THERAPY: ICD-10-CM

## 2025-06-16 DIAGNOSIS — D68.61 ANTI-PHOSPHOLIPID ANTIBODY SYNDROME: Primary | Chronic | ICD-10-CM

## 2025-06-16 DIAGNOSIS — M32.19 OTHER SYSTEMIC LUPUS ERYTHEMATOSUS WITH OTHER ORGAN INVOLVEMENT: ICD-10-CM

## 2025-06-16 LAB
ABSOLUTE EOSINOPHIL (OHS): 0.19 K/UL
ABSOLUTE MONOCYTE (OHS): 0.46 K/UL (ref 0.3–1)
ABSOLUTE NEUTROPHIL COUNT (OHS): 2.31 K/UL (ref 1.8–7.7)
ALBUMIN SERPL BCP-MCNC: 3.8 G/DL (ref 3.5–5.2)
ALP SERPL-CCNC: 62 UNIT/L (ref 40–150)
ALT SERPL W/O P-5'-P-CCNC: 27 UNIT/L (ref 10–44)
ANION GAP (OHS): 6 MMOL/L (ref 8–16)
AST SERPL-CCNC: 23 UNIT/L (ref 11–45)
BASOPHILS # BLD AUTO: 0.02 K/UL
BASOPHILS NFR BLD AUTO: 0.5 %
BILIRUB SERPL-MCNC: 0.4 MG/DL (ref 0.1–1)
BILIRUB UR QL STRIP.AUTO: NEGATIVE
BUN SERPL-MCNC: 26 MG/DL (ref 8–23)
C3 SERPL-MCNC: 63 MG/DL (ref 50–180)
C4 COMPLEMENT (OHS): 9 MG/DL (ref 11–44)
CALCIUM SERPL-MCNC: 9.2 MG/DL (ref 8.7–10.5)
CHLORIDE SERPL-SCNC: 106 MMOL/L (ref 95–110)
CLARITY UR: CLEAR
CO2 SERPL-SCNC: 25 MMOL/L (ref 23–29)
COLOR UR AUTO: YELLOW
CREAT SERPL-MCNC: 1 MG/DL (ref 0.5–1.4)
CRP SERPL-MCNC: 1.2 MG/L
ERYTHROCYTE [DISTWIDTH] IN BLOOD BY AUTOMATED COUNT: 12.7 % (ref 11.5–14.5)
ERYTHROCYTE [SEDIMENTATION RATE] IN BLOOD BY PHOTOMETRIC METHOD: 8 MM/HR
GFR SERPLBLD CREATININE-BSD FMLA CKD-EPI: >60 ML/MIN/1.73/M2
GLUCOSE SERPL-MCNC: 90 MG/DL (ref 70–110)
GLUCOSE UR QL STRIP: NEGATIVE
HCT VFR BLD AUTO: 33 % (ref 40–54)
HGB BLD-MCNC: 11.1 GM/DL (ref 14–18)
HGB UR QL STRIP: NEGATIVE
IMM GRANULOCYTES # BLD AUTO: 0.01 K/UL (ref 0–0.04)
IMM GRANULOCYTES NFR BLD AUTO: 0.2 % (ref 0–0.5)
INR PPP: 2.8 (ref 0.8–1.2)
KETONES UR QL STRIP: NEGATIVE
LEUKOCYTE ESTERASE UR QL STRIP: NEGATIVE
LYMPHOCYTES # BLD AUTO: 1.31 K/UL (ref 1–4.8)
MCH RBC QN AUTO: 31.4 PG (ref 27–31)
MCHC RBC AUTO-ENTMCNC: 33.6 G/DL (ref 32–36)
MCV RBC AUTO: 94 FL (ref 82–98)
NITRITE UR QL STRIP: NEGATIVE
NUCLEATED RBC (/100WBC) (OHS): 0 /100 WBC
PH UR STRIP: 7 [PH]
PLATELET # BLD AUTO: 214 K/UL (ref 150–450)
PMV BLD AUTO: 9.4 FL (ref 9.2–12.9)
POTASSIUM SERPL-SCNC: 4.6 MMOL/L (ref 3.5–5.1)
PROT SERPL-MCNC: 6.7 GM/DL (ref 6–8.4)
PROT UR QL STRIP: NEGATIVE
PROTHROMBIN TIME: 29.9 SECONDS (ref 9–12.5)
RBC # BLD AUTO: 3.53 M/UL (ref 4.6–6.2)
RELATIVE EOSINOPHIL (OHS): 4.4 %
RELATIVE LYMPHOCYTE (OHS): 30.5 % (ref 18–48)
RELATIVE MONOCYTE (OHS): 10.7 % (ref 4–15)
RELATIVE NEUTROPHIL (OHS): 53.7 % (ref 38–73)
SODIUM SERPL-SCNC: 137 MMOL/L (ref 136–145)
SP GR UR STRIP: 1.02
UROBILINOGEN UR STRIP-ACNC: NEGATIVE EU/DL
WBC # BLD AUTO: 4.3 K/UL (ref 3.9–12.7)

## 2025-06-16 PROCEDURE — 86160 COMPLEMENT ANTIGEN: CPT | Mod: HCNC

## 2025-06-16 PROCEDURE — 85652 RBC SED RATE AUTOMATED: CPT | Mod: HCNC

## 2025-06-16 PROCEDURE — 86140 C-REACTIVE PROTEIN: CPT | Mod: HCNC

## 2025-06-16 PROCEDURE — 86160 COMPLEMENT ANTIGEN: CPT | Mod: 59,HCNC

## 2025-06-16 PROCEDURE — 93793 ANTICOAG MGMT PT WARFARIN: CPT | Mod: S$GLB,,,

## 2025-06-16 PROCEDURE — 85610 PROTHROMBIN TIME: CPT | Mod: HCNC

## 2025-06-16 PROCEDURE — 85025 COMPLETE CBC W/AUTO DIFF WBC: CPT | Mod: HCNC

## 2025-06-16 PROCEDURE — 81003 URINALYSIS AUTO W/O SCOPE: CPT | Mod: HCNC

## 2025-06-16 PROCEDURE — 82374 ASSAY BLOOD CARBON DIOXIDE: CPT | Mod: HCNC

## 2025-06-16 PROCEDURE — 36415 COLL VENOUS BLD VENIPUNCTURE: CPT | Mod: HCNC

## 2025-06-16 PROCEDURE — 86225 DNA ANTIBODY NATIVE: CPT | Mod: HCNC

## 2025-06-16 NOTE — PROGRESS NOTES
[facility]    2025 10:16 AM    Assessment/Plan:    Patient presents today with therapeutic INR.    Assessment of patient findings and chart review: no significant findings     Recommendation for patient's warfarin regimen: Continue current maintenance dose    Recommend repeat INR in 3 weeks  _________________________________________________________________    Viraj Olsen (74 y.o.) is followed by the [facility].    Anticoagulation Summary  As of 2025      INR goal:  2.5-3.5   TTR:  68.0% (12.9 y)   INR used for dosin.8 (2025)   Warfarin maintenance plan:  5 mg (5 mg x 1) every Sun, Wed, Fri; 10 mg (5 mg x 2) all other days   Weekly warfarin total:  55 mg   Plan last modified:  Camelia Christopher, PharmD (2024)   Next INR check:  2025   Target end date:  --    Indications    Other forms of systemic lupus erythematosus [M32.8]  Impending cerebrovascular accident (Resolved) [I63.9]  Anti-phospholipid antibody syndrome [D68.61]  Long term current use of anticoagulant therapy [Z79.01]                 Anticoagulation Episode Summary       INR check location:  Clinic Lab    Preferred lab:  [facility]    Send INR reminders to:  [facility] COUMADIN MONITORING POOL    Comments:  [facility] only Mon - Thu (INRs ) // Antiphospholipid - must go to lab          Anticoagulation Care Providers       Provider Role Specialty Phone number    Preet Quintero MD Responsible Rheumatology [phone]

## 2025-06-17 DIAGNOSIS — Z79.01 LONG TERM CURRENT USE OF ANTICOAGULANT THERAPY: ICD-10-CM

## 2025-06-17 DIAGNOSIS — D68.61 ANTI-PHOSPHOLIPID ANTIBODY SYNDROME: ICD-10-CM

## 2025-06-17 DIAGNOSIS — M32.9 SYSTEMIC LUPUS ERYTHEMATOSUS, UNSPECIFIED SLE TYPE, UNSPECIFIED ORGAN INVOLVEMENT STATUS: ICD-10-CM

## 2025-06-17 RX ORDER — WARFARIN SODIUM 5 MG/1
TABLET ORAL
Qty: 195 TABLET | Refills: 0 | Status: SHIPPED | OUTPATIENT
Start: 2025-06-17

## 2025-06-18 LAB
DSDNA ANTIBODY (OHS): POSITIVE
DSDNA ANTIBODY TITER (OHS): ABNORMAL

## 2025-06-21 DIAGNOSIS — M17.30 POST-TRAUMATIC OSTEOARTHRITIS OF KNEE, UNSPECIFIED LATERALITY: ICD-10-CM

## 2025-06-23 ENCOUNTER — RESULTS FOLLOW-UP (OUTPATIENT)
Dept: RHEUMATOLOGY | Facility: CLINIC | Age: 75
End: 2025-06-23

## 2025-06-23 RX ORDER — CELECOXIB 200 MG/1
CAPSULE ORAL
Qty: 50 CAPSULE | Refills: 3 | Status: SHIPPED | OUTPATIENT
Start: 2025-06-23

## 2025-07-14 ENCOUNTER — LAB VISIT (OUTPATIENT)
Dept: LAB | Facility: HOSPITAL | Age: 75
End: 2025-07-14
Attending: INTERNAL MEDICINE
Payer: MEDICARE

## 2025-07-14 ENCOUNTER — ANTI-COAG VISIT (OUTPATIENT)
Dept: CARDIOLOGY | Facility: CLINIC | Age: 75
End: 2025-07-14
Payer: MEDICARE

## 2025-07-14 DIAGNOSIS — Z79.01 LONG TERM CURRENT USE OF ANTICOAGULANT THERAPY: ICD-10-CM

## 2025-07-14 DIAGNOSIS — M32.19 OTHER SYSTEMIC LUPUS ERYTHEMATOSUS WITH OTHER ORGAN INVOLVEMENT: ICD-10-CM

## 2025-07-14 DIAGNOSIS — D68.61 ANTI-PHOSPHOLIPID ANTIBODY SYNDROME: Primary | Chronic | ICD-10-CM

## 2025-07-14 DIAGNOSIS — D68.61 ANTI-PHOSPHOLIPID ANTIBODY SYNDROME: Chronic | ICD-10-CM

## 2025-07-14 LAB
INR PPP: 4.1 (ref 0.8–1.2)
PROTHROMBIN TIME: 42.3 SECONDS (ref 9–12.5)

## 2025-07-14 PROCEDURE — 85610 PROTHROMBIN TIME: CPT | Mod: HCNC

## 2025-07-14 PROCEDURE — 36415 COLL VENOUS BLD VENIPUNCTURE: CPT | Mod: HCNC

## 2025-07-14 PROCEDURE — 93793 ANTICOAG MGMT PT WARFARIN: CPT | Mod: S$GLB,,,

## 2025-07-14 NOTE — PROGRESS NOTES
[facility]    2025 3:45 PM    Assessment/Plan:    Patient presents today with supratherapeutic INR.    Assessment of patient findings and chart review: reports beer intake and inconsistent diet over the last few weeks     Recommendation for patient's warfarin regimen: Lower dose today to 5mg then resume current maintenance dose    Recommend repeat INR in 2 weeks  _________________________________________________________________    Viraj Olsen (74 y.o.) is followed by the [facility].    Anticoagulation Summary  As of 2025      INR goal:  2.5-3.5   TTR:  67.9% (13 y)   INR used for dosin.1 (2025)   Warfarin maintenance plan:  5 mg (5 mg x 1) every Sun, Wed, Fri; 10 mg (5 mg x 2) all other days   Weekly warfarin total:  55 mg   Plan last modified:  Camelia Christopher, PharmD (2024)   Next INR check:  2025   Target end date:  --    Indications    Other forms of systemic lupus erythematosus [M32.8]  Impending cerebrovascular accident (Resolved) [I63.9]  Anti-phospholipid antibody syndrome [D68.61]  Long term current use of anticoagulant therapy [Z79.01]                 Anticoagulation Episode Summary       INR check location:  Clinic Lab    Preferred lab:  [facility]    Send INR reminders to:  [facility] COUMADIN MONITORING POOL    Comments:  [facility] only Mon - Thu (INRs ) // Antiphospholipid - must go to lab          Anticoagulation Care Providers       Provider Role Specialty Phone number    Preet Quintero MD [facility] Rheumatology [phone]

## 2025-07-28 ENCOUNTER — ANTI-COAG VISIT (OUTPATIENT)
Dept: CARDIOLOGY | Facility: CLINIC | Age: 75
End: 2025-07-28
Payer: MEDICARE

## 2025-07-28 ENCOUNTER — LAB VISIT (OUTPATIENT)
Dept: LAB | Facility: HOSPITAL | Age: 75
End: 2025-07-28
Attending: INTERNAL MEDICINE
Payer: MEDICARE

## 2025-07-28 DIAGNOSIS — D68.61 ANTI-PHOSPHOLIPID ANTIBODY SYNDROME: Chronic | ICD-10-CM

## 2025-07-28 DIAGNOSIS — M32.19 OTHER SYSTEMIC LUPUS ERYTHEMATOSUS WITH OTHER ORGAN INVOLVEMENT: ICD-10-CM

## 2025-07-28 DIAGNOSIS — Z79.01 LONG TERM CURRENT USE OF ANTICOAGULANT THERAPY: ICD-10-CM

## 2025-07-28 DIAGNOSIS — M32.19 OTHER SYSTEMIC LUPUS ERYTHEMATOSUS WITH OTHER ORGAN INVOLVEMENT: Primary | ICD-10-CM

## 2025-07-28 LAB
INR PPP: 2.8 (ref 0.8–1.2)
PROTHROMBIN TIME: 29.5 SECONDS (ref 9–12.5)

## 2025-07-28 PROCEDURE — 93793 ANTICOAG MGMT PT WARFARIN: CPT | Mod: S$GLB,,,

## 2025-07-28 PROCEDURE — 85610 PROTHROMBIN TIME: CPT | Mod: HCNC

## 2025-07-28 PROCEDURE — 36415 COLL VENOUS BLD VENIPUNCTURE: CPT | Mod: HCNC

## 2025-07-28 NOTE — PROGRESS NOTES
[facility]    2025 8:37 AM    Assessment/Plan:    Patient presents today with therapeutic INR.    Assessment of patient findings and chart review: no significant findings     Recommendation for patient's warfarin regimen: Continue current maintenance dose    Recommend repeat INR in 2 weeks  _________________________________________________________________    Viraj Olsen (74 y.o.) is followed by the [facility].    Anticoagulation Summary  As of 2025      INR goal:  2.5-3.5   TTR:  67.9% (13 y)   INR used for dosin.8 (2025)   Warfarin maintenance plan:  5 mg (5 mg x 1) every Sun, Wed, Fri; 10 mg (5 mg x 2) all other days   Weekly warfarin total:  55 mg   Plan last modified:  Camelia Christopher, PharmD (2024)   Next INR check:  2025   Target end date:  --    Indications    Other forms of systemic lupus erythematosus [M32.8]  Impending cerebrovascular accident (Resolved) [I63.9]  Anti-phospholipid antibody syndrome [D68.61]  Long term current use of anticoagulant therapy [Z79.01]                 Anticoagulation Episode Summary       INR check location:  Clinic Lab    Preferred lab:  [facility]    Send INR reminders to:  [facility] COUMADIN MONITORING POOL    Comments:  [facility] only Mon - Thu (INRs ) // Antiphospholipid - must go to lab          Anticoagulation Care Providers       Provider Role Specialty Phone number    Preet Quintero MD Responsible Rheumatology [phone]

## 2025-08-11 ENCOUNTER — ANTI-COAG VISIT (OUTPATIENT)
Dept: CARDIOLOGY | Facility: CLINIC | Age: 75
End: 2025-08-11
Payer: MEDICARE

## 2025-08-11 ENCOUNTER — LAB VISIT (OUTPATIENT)
Dept: LAB | Facility: HOSPITAL | Age: 75
End: 2025-08-11
Attending: INTERNAL MEDICINE
Payer: MEDICARE

## 2025-08-11 DIAGNOSIS — M32.19 OTHER SYSTEMIC LUPUS ERYTHEMATOSUS WITH OTHER ORGAN INVOLVEMENT: ICD-10-CM

## 2025-08-11 DIAGNOSIS — M32.19 OTHER SYSTEMIC LUPUS ERYTHEMATOSUS WITH OTHER ORGAN INVOLVEMENT: Primary | ICD-10-CM

## 2025-08-11 DIAGNOSIS — Z79.01 LONG TERM CURRENT USE OF ANTICOAGULANT THERAPY: ICD-10-CM

## 2025-08-11 DIAGNOSIS — D68.61 ANTI-PHOSPHOLIPID ANTIBODY SYNDROME: Chronic | ICD-10-CM

## 2025-08-11 LAB
INR PPP: 2.4 (ref 0.8–1.2)
PROTHROMBIN TIME: 25.9 SECONDS (ref 9–12.5)

## 2025-08-11 PROCEDURE — 93793 ANTICOAG MGMT PT WARFARIN: CPT | Mod: S$GLB,,,

## 2025-08-11 PROCEDURE — 36415 COLL VENOUS BLD VENIPUNCTURE: CPT | Mod: HCNC

## 2025-08-11 PROCEDURE — 85610 PROTHROMBIN TIME: CPT | Mod: HCNC

## 2025-08-27 ENCOUNTER — LAB VISIT (OUTPATIENT)
Dept: LAB | Facility: HOSPITAL | Age: 75
End: 2025-08-27
Attending: INTERNAL MEDICINE
Payer: MEDICARE

## 2025-08-27 ENCOUNTER — ANTI-COAG VISIT (OUTPATIENT)
Dept: CARDIOLOGY | Facility: CLINIC | Age: 75
End: 2025-08-27
Payer: MEDICARE

## 2025-08-27 DIAGNOSIS — M32.19 OTHER SYSTEMIC LUPUS ERYTHEMATOSUS WITH OTHER ORGAN INVOLVEMENT: ICD-10-CM

## 2025-08-27 DIAGNOSIS — Z79.01 LONG TERM CURRENT USE OF ANTICOAGULANT THERAPY: ICD-10-CM

## 2025-08-27 DIAGNOSIS — D68.61 ANTI-PHOSPHOLIPID ANTIBODY SYNDROME: Chronic | ICD-10-CM

## 2025-08-27 DIAGNOSIS — M32.19 OTHER SYSTEMIC LUPUS ERYTHEMATOSUS WITH OTHER ORGAN INVOLVEMENT: Primary | ICD-10-CM

## 2025-08-27 LAB
INR PPP: 2.5 (ref 0.8–1.2)
PROTHROMBIN TIME: 26.5 SECONDS (ref 9–12.5)

## 2025-08-27 PROCEDURE — 85610 PROTHROMBIN TIME: CPT | Mod: HCNC

## 2025-08-27 PROCEDURE — 93793 ANTICOAG MGMT PT WARFARIN: CPT | Mod: S$GLB,,,

## 2025-08-27 PROCEDURE — 36415 COLL VENOUS BLD VENIPUNCTURE: CPT | Mod: HCNC
